# Patient Record
Sex: FEMALE | Race: WHITE | Employment: PART TIME | URBAN - METROPOLITAN AREA
[De-identification: names, ages, dates, MRNs, and addresses within clinical notes are randomized per-mention and may not be internally consistent; named-entity substitution may affect disease eponyms.]

---

## 2018-07-14 ENCOUNTER — HOSPITAL ENCOUNTER (EMERGENCY)
Age: 28
Discharge: LEFT BEFORE DISCHARGE | End: 2018-07-14
Attending: EMERGENCY MEDICINE
Payer: COMMERCIAL

## 2018-07-14 VITALS
WEIGHT: 107.4 LBS | TEMPERATURE: 98.4 F | BODY MASS INDEX: 17.9 KG/M2 | HEART RATE: 86 BPM | HEIGHT: 65 IN | OXYGEN SATURATION: 100 % | RESPIRATION RATE: 11 BRPM

## 2018-07-14 DIAGNOSIS — R10.84 GENERALIZED ABDOMINAL PAIN: Primary | ICD-10-CM

## 2018-07-14 LAB
BACTERIA: ABNORMAL /HPF
BILIRUBIN URINE: NEGATIVE MG/DL
BLOOD, URINE: ABNORMAL
CLARITY: ABNORMAL
COLOR: ABNORMAL
EPITHELIAL CELLS, UA: ABNORMAL /HPF
GLUCOSE URINE: NEGATIVE MG/DL
KETONES, URINE: NEGATIVE MG/DL
LEUKOCYTE ESTERASE, URINE: NEGATIVE
MICROSCOPIC EXAMINATION: YES
MUCUS: ABNORMAL /LPF
NITRITE, URINE: NEGATIVE
PH UA: 6.5
PREGNANCY, URINE: NEGATIVE
PROTEIN UA: ABNORMAL MG/DL
RBC UA: ABNORMAL /HPF (ref 0–2)
SPECIFIC GRAVITY UA: 1.02
UROBILINOGEN, URINE: 0.2 E.U./DL
WBC UA: ABNORMAL /HPF (ref 0–5)

## 2018-07-14 PROCEDURE — 81001 URINALYSIS AUTO W/SCOPE: CPT

## 2018-07-14 PROCEDURE — 84703 CHORIONIC GONADOTROPIN ASSAY: CPT

## 2018-07-14 PROCEDURE — 99283 EMERGENCY DEPT VISIT LOW MDM: CPT

## 2018-07-14 RX ORDER — METOCLOPRAMIDE HYDROCHLORIDE 5 MG/ML
10 INJECTION INTRAMUSCULAR; INTRAVENOUS ONCE
Status: DISCONTINUED | OUTPATIENT
Start: 2018-07-14 | End: 2018-07-15 | Stop reason: HOSPADM

## 2018-07-14 RX ORDER — KETOROLAC TROMETHAMINE 30 MG/ML
15 INJECTION, SOLUTION INTRAMUSCULAR; INTRAVENOUS ONCE
Status: DISCONTINUED | OUTPATIENT
Start: 2018-07-14 | End: 2018-07-15 | Stop reason: HOSPADM

## 2018-07-14 RX ORDER — DICYCLOMINE HCL 20 MG
20 TABLET ORAL ONCE
Status: DISCONTINUED | OUTPATIENT
Start: 2018-07-14 | End: 2018-07-15 | Stop reason: HOSPADM

## 2018-07-14 RX ORDER — SODIUM CHLORIDE, SODIUM LACTATE, POTASSIUM CHLORIDE, AND CALCIUM CHLORIDE .6; .31; .03; .02 G/100ML; G/100ML; G/100ML; G/100ML
1000 INJECTION, SOLUTION INTRAVENOUS ONCE
Status: DISCONTINUED | OUTPATIENT
Start: 2018-07-14 | End: 2018-07-15 | Stop reason: HOSPADM

## 2018-07-14 RX ORDER — ONDANSETRON 2 MG/ML
4 INJECTION INTRAMUSCULAR; INTRAVENOUS ONCE
Status: DISCONTINUED | OUTPATIENT
Start: 2018-07-14 | End: 2018-07-15 | Stop reason: HOSPADM

## 2018-07-15 NOTE — ED PROVIDER NOTES
[address]          ATTENDING PHYSICIAN NOTE       Date of evaluation: 7/14/2018    Chief Complaint     Other (possible tape worms); Fatigue; Rash; and Anorexia      History of Present Illness     Kwaku Carter is a 33 y.o. female with a PMH as described below who presents to the ED today with a chief complaint of:  Nausea, vomiting, and abdominal pain. The patient states that she has had these symptoms going on for over one month. She does have a history of IBS and has seen gastroenterology and long time ago but does not take any medication other than marijuana for her symptoms. She says that she smokes on a daily basis because of her chronic abdominal pain. She feels like every time she eats she immediately throws up or has the need to have a bowel movement and has mucous-like runny stool. She had a bowel movement today and thought she saw tapeworm with additional abdominal pain and nausea prompting her presentation to the emergency department. She has additional other complaints including a chronic rash to the left side of her chest that has been present for multiple months and she has previously been diagnosed with chronic tinea corpus. The patient states that there were no other associated signs and symptoms or modifying factors. Patient rates pain as 6/10. Review of Systems     As described above in the HPI otherwise all the systems reviewed and negative as reported by the patient. Past Medical, Surgical, Family, and Social History     She has a past medical history of Anxiety; Asthma; Depression; IBS (irritable bowel syndrome); Migraine; and Tachycardia. She has a past surgical history that includes Upper gastrointestinal endoscopy; Dilation and curettage of uterus; Colonoscopy; and other surgical history. Her family history is not on file. She reports that she has been smoking Cigarettes. She has been smoking about 0.50 packs per day.  She mg/dL    Specific Gravity, UA 1.025 1.005 - 1.030    Blood, Urine SMALL (A) Negative    pH, UA 6.5 5.0 - 8.0    Protein, UA TRACE (A) Negative mg/dL    Urobilinogen, Urine 0.2 <2.0 E.U./dL    Nitrite, Urine Negative Negative    Leukocyte Esterase, Urine Negative Negative    Microscopic Examination Yes    POC Pregnancy Urine Qual   Result Value Ref Range    Pregnancy, Urine Negative Detects HCG level >25 MIU/mL       ED BEDSIDE ULTRASOUND:  n/a    RECENT VITALS:   , Temp: 98.4 °F (36.9 °C), Pulse: 86, Resp: 11, SpO2: 100 %     Procedures         ED Course     Nursing Notes, Past Medical Hx, Past Surgical Hx, Social Hx, Allergies, and Family Hx were reviewed. The patient was given the following medications:  Orders Placed This Encounter   Medications    DISCONTD: metoclopramide (REGLAN) injection 10 mg    DISCONTD: ketorolac (TORADOL) injection 15 mg    DISCONTD: ondansetron (ZOFRAN) injection 4 mg    DISCONTD: lactated ringers bolus    DISCONTD: dicyclomine (BENTYL) tablet 20 mg    DISCONTD: famotidine (PEPCID) injection 20 mg       CONSULTS:  None    MEDICAL DECISION MAKING / ASSESSMENT / Miladis Patience is a 33 y.o. female on examination patient is very well-appearing with some mild generalized abdominal discomfort. Her symptoms have been going on for multiple months. After a long conversation with the patient, I offered her medications to help with her symptoms. When I left the room, nursing staff went back in to check on the patient and the patient had eloped from the emergency department. Prior to the patient eloping I had talked to her about the need to stop smoking marijuana on a daily basis as this is likely exacerbating her symptoms. The patient told me that she smokes every day and has been doing this for 5 years and did not feel like this was the cause of her symptoms. Clinical Impression     1.  Generalized abdominal pain        Disposition     PATIENT REFERRED TO:  No follow-up provider specified.     DISCHARGE MEDICATIONS:  Discharge Medication List as of 7/14/2018 10:44 PM          DISPOSITION           Ellen Weber MD  07/15/18 7300

## 2020-03-03 ENCOUNTER — HOSPITAL ENCOUNTER (EMERGENCY)
Age: 30
Discharge: HOME | End: 2020-03-03
Attending: EMERGENCY MEDICINE
Payer: COMMERCIAL

## 2020-03-03 VITALS
OXYGEN SATURATION: 100 % | SYSTOLIC BLOOD PRESSURE: 96 MMHG | BODY MASS INDEX: 17.81 KG/M2 | WEIGHT: 107 LBS | DIASTOLIC BLOOD PRESSURE: 70 MMHG | TEMPERATURE: 98.1 F | RESPIRATION RATE: 20 BRPM | HEART RATE: 105 BPM

## 2020-03-03 PROCEDURE — 6370000000 HC RX 637 (ALT 250 FOR IP): Performed by: PHYSICIAN ASSISTANT

## 2020-03-03 PROCEDURE — 99283 EMERGENCY DEPT VISIT LOW MDM: CPT

## 2020-03-03 RX ORDER — LIDOCAINE 50 MG/G
1 PATCH TOPICAL DAILY
Qty: 30 PATCH | Refills: 0 | Status: SHIPPED | OUTPATIENT
Start: 2020-03-03 | End: 2020-04-02

## 2020-03-03 RX ORDER — IBUPROFEN 400 MG/1
800 TABLET ORAL ONCE
Status: COMPLETED | OUTPATIENT
Start: 2020-03-03 | End: 2020-03-03

## 2020-03-03 RX ORDER — LIDOCAINE 4 G/G
1 PATCH TOPICAL DAILY
Status: DISCONTINUED | OUTPATIENT
Start: 2020-03-04 | End: 2020-03-03

## 2020-03-03 RX ORDER — CYCLOBENZAPRINE HCL 10 MG
10 TABLET ORAL ONCE
Status: COMPLETED | OUTPATIENT
Start: 2020-03-03 | End: 2020-03-03

## 2020-03-03 RX ORDER — LIDOCAINE 4 G/G
1 PATCH TOPICAL ONCE
Status: DISCONTINUED | OUTPATIENT
Start: 2020-03-03 | End: 2020-03-04 | Stop reason: HOSPADM

## 2020-03-03 RX ORDER — CYCLOBENZAPRINE HCL 5 MG
5 TABLET ORAL 2 TIMES DAILY PRN
Qty: 10 TABLET | Refills: 0 | Status: SHIPPED | OUTPATIENT
Start: 2020-03-03 | End: 2020-03-13

## 2020-03-03 RX ADMIN — CYCLOBENZAPRINE HYDROCHLORIDE 10 MG: 10 TABLET, FILM COATED ORAL at 21:51

## 2020-03-03 RX ADMIN — IBUPROFEN 800 MG: 400 TABLET, FILM COATED ORAL at 21:51

## 2020-03-03 ASSESSMENT — ENCOUNTER SYMPTOMS
BACK PAIN: 1
NAUSEA: 0
VOMITING: 0

## 2020-03-03 ASSESSMENT — PAIN DESCRIPTION - DESCRIPTORS: DESCRIPTORS: STABBING;SHARP

## 2020-03-03 ASSESSMENT — PAIN DESCRIPTION - LOCATION: LOCATION: NECK

## 2020-03-03 ASSESSMENT — PAIN DESCRIPTION - PAIN TYPE: TYPE: CHRONIC PAIN

## 2020-03-03 ASSESSMENT — PAIN SCALES - GENERAL
PAINLEVEL_OUTOF10: 10
PAINLEVEL_OUTOF10: 10

## 2020-03-03 ASSESSMENT — PAIN DESCRIPTION - FREQUENCY: FREQUENCY: CONTINUOUS

## 2020-03-04 NOTE — ED PROVIDER NOTES
ED Attending Attestation Note     Date of evaluation: 3/3/2020    This patient was seen by the advance practice provider. I have seen and examined the patient, agree with the workup, evaluation, management and diagnosis. The care plan has been discussed. My assessment reveals well appearing ffemale with atraumatic right neck/trap pain after picking something up. Exam reveals full ROM in neck and LUE, neurovasc intact, no midline c/t/l spine pain.      Shai Orona MD  03/03/20 [id number]

## 2020-03-04 NOTE — ED PROVIDER NOTES
[address] [facility] ENCOUNTER          PHYSICIAN ASSISTANT NOTE       Date of evaluation: 3/3/2020    Chief Complaint     Neck Pain      History of Present Illness     Maryann Dale is a 26 y.o. female who presents to the emergency department complaining of right-sided neck and back pain which is been ongoing today and started while she was in the bathroom attempting to fix her glasses. She reports that she was in a crouched position over her glasses for several minutes and when she straightened herself back up she had stiffness and pain on the right side of her neck as well as into the right side of her back and underneath her right shoulder blade. Over the course of the day she is continue to try to stretch this area out but the pain and stiffness has been worsening. She has never had symptoms like this in the past.  She has never had any back or neck surgeries in the past.  She has not taken any medication for relief to this point. She denies any injury preceding the symptoms. She denies fevers, chills, numbness, tingling, chest pain, dizziness, headache or other symptoms at this time. Review of Systems     Review of Systems   Gastrointestinal: Negative for nausea and vomiting. Musculoskeletal: Positive for back pain, neck pain and neck stiffness. Neurological: Negative for dizziness, seizures, numbness and headaches. Past Medical, Surgical, Family, and Social History     She has a past medical history of Anxiety, Asthma, Depression, IBS (irritable bowel syndrome), Migraine, and Tachycardia. She has a past surgical history that includes Upper gastrointestinal endoscopy; Dilation and curettage of uterus; Colonoscopy; and other surgical history. Her family history is not on file. She reports that she has been smoking cigarettes. She has been smoking about 0.50 packs per day. She has never used smokeless tobacco. She reports current alcohol use.  She reports current

## 2020-03-04 NOTE — ED NOTES
Dc inst and scripts given to pt and verb understanding.   Waiting in room until boyfriend gets back to take her home     Sonia Juárez RN  03/03/20 [id number]

## 2020-03-14 VITALS
HEART RATE: 93 BPM | RESPIRATION RATE: 18 BRPM | DIASTOLIC BLOOD PRESSURE: 64 MMHG | OXYGEN SATURATION: 99 % | TEMPERATURE: 98.2 F | SYSTOLIC BLOOD PRESSURE: 111 MMHG

## 2020-03-14 RX ORDER — CYCLOBENZAPRINE HCL 10 MG
10 TABLET ORAL 3 TIMES DAILY PRN
COMMUNITY
End: 2021-01-21 | Stop reason: ALTCHOICE

## 2020-03-14 RX ORDER — IBUPROFEN 800 MG/1
800 TABLET ORAL EVERY 6 HOURS PRN
COMMUNITY
End: 2021-01-21 | Stop reason: ALTCHOICE

## 2020-03-15 ENCOUNTER — HOSPITAL ENCOUNTER (EMERGENCY)
Age: 30
Discharge: LEFT BEFORE DISCHARGE | End: 2020-03-15
Payer: COMMERCIAL

## 2020-03-15 PROCEDURE — 4500000002 HC ER NO CHARGE

## 2020-03-16 ENCOUNTER — HOSPITAL ENCOUNTER (EMERGENCY)
Age: 30
Discharge: HOME | End: 2020-03-16
Payer: COMMERCIAL

## 2020-03-16 VITALS
RESPIRATION RATE: 16 BRPM | HEIGHT: 66 IN | SYSTOLIC BLOOD PRESSURE: 111 MMHG | WEIGHT: 115 LBS | BODY MASS INDEX: 18.48 KG/M2 | OXYGEN SATURATION: 100 % | HEART RATE: 125 BPM | DIASTOLIC BLOOD PRESSURE: 72 MMHG | TEMPERATURE: 98.7 F

## 2020-03-16 PROCEDURE — 99282 EMERGENCY DEPT VISIT SF MDM: CPT

## 2020-03-16 RX ORDER — METHYLPREDNISOLONE 4 MG/1
TABLET ORAL
Qty: 1 KIT | Refills: 0 | Status: SHIPPED | OUTPATIENT
Start: 2020-03-16 | End: 2020-03-22

## 2020-03-16 RX ORDER — METHOCARBAMOL 750 MG/1
750 TABLET, FILM COATED ORAL 4 TIMES DAILY
Qty: 20 TABLET | Refills: 0 | Status: SHIPPED | OUTPATIENT
Start: 2020-03-16 | End: 2020-03-26

## 2020-03-16 ASSESSMENT — PAIN DESCRIPTION - PROGRESSION: CLINICAL_PROGRESSION: GRADUALLY WORSENING

## 2020-03-16 ASSESSMENT — PAIN DESCRIPTION - ONSET: ONSET: PROGRESSIVE

## 2020-03-16 ASSESSMENT — PAIN DESCRIPTION - PAIN TYPE: TYPE: ACUTE PAIN

## 2020-03-16 ASSESSMENT — PAIN - FUNCTIONAL ASSESSMENT: PAIN_FUNCTIONAL_ASSESSMENT: PREVENTS OR INTERFERES SOME ACTIVE ACTIVITIES AND ADLS

## 2020-03-16 ASSESSMENT — PAIN DESCRIPTION - LOCATION: LOCATION: ARM

## 2020-03-16 ASSESSMENT — PAIN SCALES - GENERAL: PAINLEVEL_OUTOF10: 8

## 2020-03-16 ASSESSMENT — PAIN DESCRIPTION - FREQUENCY: FREQUENCY: CONTINUOUS

## 2020-03-16 ASSESSMENT — PAIN DESCRIPTION - ORIENTATION: ORIENTATION: RIGHT

## 2020-03-16 ASSESSMENT — PAIN DESCRIPTION - DESCRIPTORS: DESCRIPTORS: BURNING;SHOOTING

## 2020-03-16 NOTE — ED NOTES
Discharge order received. Patient being DC home. All paperwork explained to patient. She verbalizes understanding and denies any questions. All belongings sent with patient.       Farhana Mitchell RN  03/16/20 2127

## 2020-03-16 NOTE — ED PROVIDER NOTES
smokeless tobacco. She reports current alcohol use. She reports current drug use. Drug: Marijuana. Family History: Her family history is not on file. Medications     Discharge Medication List as of 3/16/2020  1:47 PM      CONTINUE these medications which have NOT CHANGED    Details   ibuprofen (ADVIL;MOTRIN) 800 MG tablet Take 800 mg by mouth every 6 hours as needed for PainHistorical Med      cyclobenzaprine (FLEXERIL) 10 MG tablet Take 10 mg by mouth 3 times daily as needed for Muscle spasmsHistorical Med      lidocaine (LIDODERM) 5 % Place 1 patch onto the skin daily 12 hours on, 12 hours off., Disp-30 patch, R-0Print             Allergies     Allergies: Allergies as of 03/16/2020 - Review Complete 03/16/2020   Allergen Reaction Noted    Penicillins Hives 06/04/2011        Physical Exam     INITIAL VITALS: BP: 111/72, Temp: 98.7 °F (37.1 °C), Pulse: 125, Resp: 16, SpO2: 100 %     General: 31 y.o. female in no apparent distress, well developed, well nourished, non-toxic appearance. HEENT: Atraumatic, normocephalic. EOMs intact. Neck:  Full range of motion. Chest/pulm: Respiratory rate normal. Speaks in complete sentences, no respiratory distress, lungs CTA bilaterally, no wheezes, rales, rhonchi. Cardiovascular: Heart rate normal. RRR, no murmurs, rubs, gallops     Abdomen: No gross distension. Soft, non-tender, non-peritoneal.     : Deferred. Musculoskeletal: No obvious joint deformity. Ambulates without difficulty. Midline cervical, thoracic, or lumbar spinal tenderness to palpation. Mild right cervical paraspinous muscle and trapezius tenderness. Her Spurling is negative. She has 5 out of 5 strength with shoulder abduction, elbow flexion, extension, wrist flexion, extension and handgrip bilaterally. Neuro: A&O x 4. Normal speech without dysarthria or aphasia. Moves all extremities spontaneously and symmetrically. Gait normal without ataxia. Skin: Warm, dry.  No obvious rashes, petechiae, or purpura. Psych: Appropriate mood and affect, normal interaction. Diagnostic Results     RECENT VITALS:  BP: 111/72, Temp: 98.7 °F (37.1 °C), Pulse: 125, Resp: 16, SpO2: 100 %     Procedures     None     ED Course     Nursing Notes, Past Medical Hx, Past Surgical Hx, Social Hx, Allergies, and Family Hx were reviewed. The patient was given the following medications:  Orders Placed This Encounter   Medications    methocarbamol (ROBAXIN-750) 750 MG tablet     Sig: Take 1 tablet by mouth 4 times daily for 10 days     Dispense:  20 tablet     Refill:  0    methylPREDNISolone (MEDROL, MARISSA,) 4 MG tablet     Sig: Take by mouth. Dispense:  1 kit     Refill:  0            CONSULTS:  None    MEDICAL DECISION MAKING / ASSESSMENT / PLAN     Briefly, Lyle Simms is a 31 y.o. female who presented to the emergency department with right arm pain. On presentation, she is well-appearing in no acute distress. She is initially tachycardic to 125 with otherwise stable vital signs. Her tachycardia resolved on my exam.  On exam, she has no bony tenderness to palpation, does have some right paraspinous muscle tenderness and trapezius tenderness but a negative Spurling and strength through lateral upper extremities. I do not believe she requires imaging. She became tearful when I told her that I could not get an MRI from the emergency department. She repeatedly states that she \"just wants help\". I told her that I could refer her to the [facility] spine center for further evaluation possible imaging. We will try some Robaxin instead of the Flexeril. Medrol Dosepak may also help her. Was encouraged on mobility, ice, heat, and other adjunct pain relief therapies. At this point in time, patient is stable for discharge. Patient was given strict return precautions as outlined in the AVS. Patient was agreeable and understanding to this plan of care.  Prior to discharge, patient was ambulatory and PO tolerant. The following PPE (COVID) was worn during this encounter:  mask     I saw this patient independently as the advance practice provider. The patient was not seen or evaluated by the attending physician    Clinical Impression     1.  Radiculopathy, cervical        Disposition     DC    DISCHARGE MEDICATIONS:  Discharge Medication List as of 3/16/2020  1:47 PM      START taking these medications    Details   methocarbamol (ROBAXIN-750) 750 MG tablet Take 1 tablet by mouth 4 times daily for 10 days, Disp-20 tablet, R-0Print      methylPREDNISolone (MEDROL, MARISSA,) 4 MG tablet Take by mouth., Disp-1 kit, R-0Print             PATIENT REFERRED TO:  [facility] Emergency Department  [address] [place] [address]  [phone]    As needed, If symptoms worsen    Jef Brown 3069  [address] [facility] [address]  Department of Emergency Medicine     [address] [place] [address]  03/16/20 5856

## 2020-12-18 ENCOUNTER — APPOINTMENT (OUTPATIENT)
Dept: CT IMAGING | Age: 30
End: 2020-12-18
Payer: COMMERCIAL

## 2020-12-18 ENCOUNTER — HOSPITAL ENCOUNTER (EMERGENCY)
Age: 30
Discharge: HOME | End: 2020-12-18
Payer: COMMERCIAL

## 2020-12-18 VITALS
SYSTOLIC BLOOD PRESSURE: 106 MMHG | OXYGEN SATURATION: 100 % | TEMPERATURE: 98.1 F | RESPIRATION RATE: 16 BRPM | DIASTOLIC BLOOD PRESSURE: 70 MMHG | HEART RATE: 85 BPM

## 2020-12-18 LAB — HCG(URINE) PREGNANCY TEST: NEGATIVE

## 2020-12-18 PROCEDURE — 84703 CHORIONIC GONADOTROPIN ASSAY: CPT

## 2020-12-18 PROCEDURE — 12001 RPR S/N/AX/GEN/TRNK 2.5CM/<: CPT

## 2020-12-18 PROCEDURE — 6370000000 HC RX 637 (ALT 250 FOR IP): Performed by: NURSE PRACTITIONER

## 2020-12-18 PROCEDURE — 99283 EMERGENCY DEPT VISIT LOW MDM: CPT

## 2020-12-18 PROCEDURE — 70450 CT HEAD/BRAIN W/O DYE: CPT

## 2020-12-18 RX ORDER — ONDANSETRON 4 MG/1
4-8 TABLET, ORALLY DISINTEGRATING ORAL EVERY 12 HOURS PRN
Qty: 12 TABLET | Refills: 0 | Status: SHIPPED | OUTPATIENT
Start: 2020-12-18 | End: 2021-01-21 | Stop reason: ALTCHOICE

## 2020-12-18 RX ORDER — ACETAMINOPHEN 500 MG
1000 TABLET ORAL 4 TIMES DAILY PRN
Qty: 120 TABLET | Refills: 0 | Status: SHIPPED | OUTPATIENT
Start: 2020-12-18 | End: 2021-01-21 | Stop reason: ALTCHOICE

## 2020-12-18 RX ORDER — ACETAMINOPHEN 500 MG
1000 TABLET ORAL ONCE
Status: DISCONTINUED | OUTPATIENT
Start: 2020-12-18 | End: 2020-12-19 | Stop reason: HOSPADM

## 2020-12-18 RX ORDER — ONDANSETRON 4 MG/1
8 TABLET, ORALLY DISINTEGRATING ORAL ONCE
Status: COMPLETED | OUTPATIENT
Start: 2020-12-18 | End: 2020-12-18

## 2020-12-18 RX ADMIN — ONDANSETRON 8 MG: 4 TABLET, ORALLY DISINTEGRATING ORAL at 21:13

## 2020-12-18 ASSESSMENT — PAIN DESCRIPTION - LOCATION: LOCATION: HEAD

## 2020-12-18 ASSESSMENT — PAIN DESCRIPTION - PAIN TYPE: TYPE: ACUTE PAIN

## 2020-12-18 ASSESSMENT — PAIN SCALES - GENERAL
PAINLEVEL_OUTOF10: 5
PAINLEVEL_OUTOF10: 7
PAINLEVEL_OUTOF10: 5
PAINLEVEL_OUTOF10: 3

## 2020-12-19 NOTE — ED PROVIDER NOTES
GASTROINTESTINAL ENDOSCOPY         CURRENT MEDICATIONS  (may include discharge medications prescribed in the ED)  Current Outpatient Rx   Medication Sig Dispense Refill    ondansetron (ZOFRAN ODT) 4 MG disintegrating tablet Take 1-2 tablets by mouth every 12 hours as needed for Nausea May Sub regular tablet (non-ODT) if insurance does not cover ODT.  12 tablet 0    acetaminophen (TYLENOL) 500 MG tablet Take 2 tablets by mouth 4 times daily as needed for Pain 120 tablet 0    ibuprofen (ADVIL;MOTRIN) 800 MG tablet Take 800 mg by mouth every 6 hours as needed for Pain      cyclobenzaprine (FLEXERIL) 10 MG tablet Take 10 mg by mouth 3 times daily as needed for Muscle spasms         ALLERGIES    Allergies   Allergen Reactions    Penicillins Hives       SOCIAL & FAMILY HISTORY    Social History     Socioeconomic History    Marital status: Single     Spouse name: None    Number of children: None    Years of education: None    Highest education level: None   Occupational History    None   Social Needs    Financial resource strain: None    Food insecurity     Worry: None     Inability: None    Transportation needs     Medical: None     Non-medical: None   Tobacco Use    Smoking status: Current Every Day Smoker     Packs/day: 0.50     Types: Cigarettes    Smokeless tobacco: Never Used   Substance and Sexual Activity    Alcohol use: Yes     Comment: rare    Drug use: Yes     Types: Marijuana     Comment: daily    Sexual activity: None   Lifestyle    Physical activity     Days per week: None     Minutes per session: None    Stress: None   Relationships    Social connections     Talks on phone: None     Gets together: None     Attends [employer] witness service: None     Active member of club or organization: None     Attends meetings of clubs or organizations: None     Relationship status: None    Intimate partner violence     Fear of current or ex partner: None     Emotionally abused: None     Physically abused: None     Forced sexual activity: None   Other Topics Concern    None   Social History Narrative    None     History reviewed. No pertinent family history. PHYSICAL EXAM    VITAL SIGNS: /70   Pulse 85   Temp 98.1 °F (36.7 °C) (Oral)   Resp 16   SpO2 100%    Constitutional:  Well developed, well nourished, no acute distress   Scalp: no scalp hematoma, no palpable skull fractures  Neck: no posterior midline neck tenderness, no JVD   Eyes: Pupils equally round and react to light, extraocular movement are intact, no juarez signs or raccoon eyes  HENT:  No trismus, airway patent, no hemotympanum bilaterally  Respiratory: Respirations easy and unlabored, no retractions   Cardiovascular:  regular rate, no murmurs  GI:  Soft, nontender, normal bowel sounds  Musculoskeletal:  Moves all 4 extremities spontaneously, no edema, no acute deformities  Vascular: Radial and DP pulses 2+ and equal bilaterally  Integument:  Well hydrated, a small 0.5 cm laceration to the left forehead that is not bleeding, does not extend into the subcutaneous tissue, no surrounding erythema. Declining tetanus update  Neurologic:  Awake, alert, oriented x4, no aphasia, no slurred speech, CN II-XII intact, equal strength in all 4 extremities, sensation to light touch intact bilaterally, no gait abnormalities  Psych: Pleasant affect, no hallucinations    RADIOLOGY    CT Head WO Contrast   Final Result   No acute intracranial abnormality. Left frontal scalp hematoma. PROCEDURE  Laceration Repair Procedure Note  Performed by: myself  Consent:  Verbal consent obtained by patient. Risk of infection and pain discussed, as well as alternatives.   Anesthesia:  n/a  Repair type:  simple       Pre-procedure:  Patient was prepped and draped in usual sterile fashion   Laceration details:    Location: left forehead    Length (cm):  0.5cm  Preparation:  Patient was prepped and draped in usual sterile fashion   Exploration: Hemostasis achieved with direct pressure, entire depth of wound probed and visualized    Contaminated: no    Treatment:   Amount of cleaning:  Extensive     Irrigation solution:  High pressure tap water  Visualized foreign bodies/material removed: no    Skin repair:   Repair method:  dermabond   Approximation:  Close  Dressing:  C/d/i dressing  Patient tolerance of procedure: Tolerated well, no immediate complications        ED COURSE & MEDICAL DECISION MAKING    Pertinent Labs & Imaging studies reviewed and interpreted. (See chart for details)    Differential Diagnosis: epidural hematoma, subdural hematoma, parenchymal brain contusion or bleed, subarachnoid hemorrhage, skull fracture, neck fracture or dislocation, other. Neuro exam unremarkable. Given the multiple episodes of nausea and vomiting after the injury event [place] head CT criteria was met for CT head. CT scan is read by the radiologist as above. C-spine imaging not indicated per NEXUS criteria: patient has no focal neurological deficit, no midline spine tenderness, no altered level of consciousness, no intoxication, and no distracting injury. Reevaluation at 2305: Patient is feeling much better after the analgesics given to her here in the ED. She remains neurologically intact. I do believe that she is stable for discharge home. Remained afebrile and hemodynamically stable throughout her entire ED course. She is to follow-up with primary care provider. She is in agreement with this plan as well as plan of discharge verbalized understanding of strict return parameters with no further questions or concerns.     Results for orders placed or performed during the hospital encounter of 12/18/20   Pregnancy, Urine   Result Value Ref Range    HCG(Urine) Pregnancy Test Negative Detects HCG level >20 MIU/mL       I estimate there is LOW risk for SUBARACHNOID HEMORRHAGE, MENINGITIS, INTRACRANIAL HEMORRHAGE, SUBDURAL HEMATOMA, OR STROKE, thus I consider the discharge disposition reasonable. Jewel Harada and MIGUEL have discussed the diagnosis and risks, and we agree with discharging home to follow-up with their primary doctor. We also discussed returning to the Emergency Department immediately if new or worsening symptoms occur. We have discussed the symptoms which are most concerning (e.g., changing or worsening pain, weakness, vomiting, fever) that necessitate immediate return. Discharge Vital Signs:  Blood pressure 106/70, pulse 85, temperature 98.1 °F (36.7 °C), temperature source Oral, resp. rate 16, SpO2 100 %, currently breastfeeding. The patient was instructed to follow up as an outpatient in 1-3 days. The patient was instructed to return to the ED immediately for any new or worsening symptoms. The patient verbalized understanding. FINAL IMPRESSION    1. Laceration of forehead, initial encounter    2. Closed head injury, initial encounter    3.  Non-intractable vomiting with nausea, unspecified vomiting type        PLAN   Discharge with outpatient follow-up (see EMR)    (Please note that this note was completed with a voice recognition program.  Every attempt was made to edit the dictations, but inevitably there remain words that are mis-transcribed.)        Allison Solano, PAM - CNP  12/18/20 [facility] - [facility]  12/18/20 0489

## 2020-12-19 NOTE — ED NOTES
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 EMS stated pt had episode of vomiting, pt anxious. Laceration on left side of forehead. No LOC.       Rosibel Castillo RN  12/18/20 2040

## 2020-12-19 NOTE — ED NOTES
Bed: 27  Expected date:   Expected time:   Means of arrival:   Comments:  Karla Aguilar  12/18/20 2040

## 2020-12-19 NOTE — ED NOTES
Patient's visitor provided with copy of Emergency Department Visitor Restrictions. Instructed to review while waiting to visit with patient. Visitor verbalized understanding.      Yohannes Jackson RN  12/18/20 2046

## 2021-01-21 ENCOUNTER — HOSPITAL ENCOUNTER (INPATIENT)
Age: 31
LOS: 8 days | Discharge: HOME | DRG: 751 | End: 2021-01-29
Attending: STUDENT IN AN ORGANIZED HEALTH CARE EDUCATION/TRAINING PROGRAM | Admitting: PSYCHIATRY & NEUROLOGY
Payer: COMMERCIAL

## 2021-01-21 DIAGNOSIS — F60.3 BORDERLINE PERSONALITY DISORDER IN ADULT (HCC): ICD-10-CM

## 2021-01-21 DIAGNOSIS — E44.0 MODERATE PROTEIN-CALORIE MALNUTRITION (HCC): ICD-10-CM

## 2021-01-21 DIAGNOSIS — R51.9 ACUTE NONINTRACTABLE HEADACHE, UNSPECIFIED HEADACHE TYPE: ICD-10-CM

## 2021-01-21 DIAGNOSIS — F33.2 SEVERE EPISODE OF RECURRENT MAJOR DEPRESSIVE DISORDER, WITHOUT PSYCHOTIC FEATURES (HCC): Primary | ICD-10-CM

## 2021-01-21 DIAGNOSIS — R45.851 SUICIDAL IDEATION: ICD-10-CM

## 2021-01-21 DIAGNOSIS — F12.10 CANNABIS ABUSE: ICD-10-CM

## 2021-01-21 PROBLEM — F33.9 MDD (MAJOR DEPRESSIVE DISORDER), RECURRENT EPISODE (HCC): Status: ACTIVE | Noted: 2021-01-21

## 2021-01-21 LAB
A/G RATIO: 1.8 (ref 1.1–2.2)
ACETAMINOPHEN LEVEL: <5 UG/ML (ref 10–30)
ALBUMIN SERPL-MCNC: 4.5 G/DL (ref 3.4–5)
ALP BLD-CCNC: 37 U/L (ref 40–129)
ALT SERPL-CCNC: 12 U/L (ref 10–40)
AMPHETAMINE SCREEN, URINE: ABNORMAL
ANION GAP SERPL CALCULATED.3IONS-SCNC: 9 MMOL/L (ref 3–16)
AST SERPL-CCNC: 14 U/L (ref 15–37)
BACTERIA: ABNORMAL /HPF
BARBITURATE SCREEN URINE: ABNORMAL
BASOPHILS ABSOLUTE: 0.1 K/UL (ref 0–0.2)
BASOPHILS RELATIVE PERCENT: 0.9 %
BENZODIAZEPINE SCREEN, URINE: ABNORMAL
BILIRUB SERPL-MCNC: 0.5 MG/DL (ref 0–1)
BILIRUBIN URINE: NEGATIVE
BLOOD, URINE: ABNORMAL
BUN BLDV-MCNC: 12 MG/DL (ref 7–20)
CALCIUM SERPL-MCNC: 9.4 MG/DL (ref 8.3–10.6)
CANNABINOID SCREEN URINE: POSITIVE
CHLORIDE BLD-SCNC: 107 MMOL/L (ref 99–110)
CLARITY: CLEAR
CO2: 22 MMOL/L (ref 21–32)
COCAINE METABOLITE SCREEN URINE: ABNORMAL
COLOR: YELLOW
CREAT SERPL-MCNC: 0.8 MG/DL (ref 0.6–1.1)
EOSINOPHILS ABSOLUTE: 0.1 K/UL (ref 0–0.6)
EOSINOPHILS RELATIVE PERCENT: 1.8 %
EPITHELIAL CELLS, UA: ABNORMAL /HPF (ref 0–5)
ETHANOL: NORMAL MG/DL (ref 0–0.08)
GFR AFRICAN AMERICAN: >60
GFR NON-AFRICAN AMERICAN: >60
GLOBULIN: 2.5 G/DL
GLUCOSE BLD-MCNC: 112 MG/DL (ref 70–99)
GLUCOSE URINE: NEGATIVE MG/DL
HCG QUALITATIVE: NEGATIVE
HCT VFR BLD CALC: 38.4 % (ref 36–48)
HEMOGLOBIN: 13.2 G/DL (ref 12–16)
KETONES, URINE: NEGATIVE MG/DL
LEUKOCYTE ESTERASE, URINE: NEGATIVE
LYMPHOCYTES ABSOLUTE: 1.9 K/UL (ref 1–5.1)
LYMPHOCYTES RELATIVE PERCENT: 29.2 %
Lab: ABNORMAL
MCH RBC QN AUTO: 34.6 PG (ref 26–34)
MCHC RBC AUTO-ENTMCNC: 34.4 G/DL (ref 31–36)
MCV RBC AUTO: 100.4 FL (ref 80–100)
METHADONE SCREEN, URINE: ABNORMAL
MICROSCOPIC EXAMINATION: YES
MONOCYTES ABSOLUTE: 0.3 K/UL (ref 0–1.3)
MONOCYTES RELATIVE PERCENT: 5.3 %
MUCUS: ABNORMAL /LPF
NEUTROPHILS ABSOLUTE: 4 K/UL (ref 1.7–7.7)
NEUTROPHILS RELATIVE PERCENT: 62.8 %
NITRITE, URINE: NEGATIVE
OPIATE SCREEN URINE: ABNORMAL
OXYCODONE URINE: ABNORMAL
PDW BLD-RTO: 12.3 % (ref 12.4–15.4)
PH UA: 7
PH UA: 7 (ref 5–8)
PHENCYCLIDINE SCREEN URINE: ABNORMAL
PLATELET # BLD: 178 K/UL (ref 135–450)
PMV BLD AUTO: 8.3 FL (ref 5–10.5)
POTASSIUM SERPL-SCNC: 3.4 MMOL/L (ref 3.5–5.1)
PROPOXYPHENE SCREEN: ABNORMAL
PROTEIN UA: NEGATIVE MG/DL
RBC # BLD: 3.82 M/UL (ref 4–5.2)
RBC UA: ABNORMAL /HPF (ref 0–4)
SALICYLATE, SERUM: <0.3 MG/DL (ref 15–30)
SARS-COV-2, NAAT: NOT DETECTED
SODIUM BLD-SCNC: 138 MMOL/L (ref 136–145)
SPECIFIC GRAVITY UA: 1.02 (ref 1–1.03)
TOTAL PROTEIN: 7 G/DL (ref 6.4–8.2)
URINE TYPE: ABNORMAL
UROBILINOGEN, URINE: 0.2 E.U./DL
WBC # BLD: 6.4 K/UL (ref 4–11)
WBC UA: ABNORMAL /HPF (ref 0–5)

## 2021-01-21 PROCEDURE — 36415 COLL VENOUS BLD VENIPUNCTURE: CPT

## 2021-01-21 PROCEDURE — G0480 DRUG TEST DEF 1-7 CLASSES: HCPCS

## 2021-01-21 PROCEDURE — 81001 URINALYSIS AUTO W/SCOPE: CPT

## 2021-01-21 PROCEDURE — 6370000000 HC RX 637 (ALT 250 FOR IP): Performed by: PSYCHIATRY & NEUROLOGY

## 2021-01-21 PROCEDURE — 1240000000 HC EMOTIONAL WELLNESS R&B

## 2021-01-21 PROCEDURE — 80307 DRUG TEST PRSMV CHEM ANLYZR: CPT

## 2021-01-21 PROCEDURE — 80061 LIPID PANEL: CPT

## 2021-01-21 PROCEDURE — 99285 EMERGENCY DEPT VISIT HI MDM: CPT

## 2021-01-21 PROCEDURE — 83036 HEMOGLOBIN GLYCOSYLATED A1C: CPT

## 2021-01-21 PROCEDURE — 84443 ASSAY THYROID STIM HORMONE: CPT

## 2021-01-21 PROCEDURE — 6370000000 HC RX 637 (ALT 250 FOR IP): Performed by: STUDENT IN AN ORGANIZED HEALTH CARE EDUCATION/TRAINING PROGRAM

## 2021-01-21 PROCEDURE — 85025 COMPLETE CBC W/AUTO DIFF WBC: CPT

## 2021-01-21 PROCEDURE — 80053 COMPREHEN METABOLIC PANEL: CPT

## 2021-01-21 PROCEDURE — U0002 COVID-19 LAB TEST NON-CDC: HCPCS

## 2021-01-21 PROCEDURE — 84703 CHORIONIC GONADOTROPIN ASSAY: CPT

## 2021-01-21 RX ORDER — POLYETHYLENE GLYCOL 3350 17 G
2 POWDER IN PACKET (EA) ORAL
Status: DISCONTINUED | OUTPATIENT
Start: 2021-01-21 | End: 2021-01-29 | Stop reason: HOSPADM

## 2021-01-21 RX ORDER — ACETAMINOPHEN 500 MG
1000 TABLET ORAL ONCE
Status: DISCONTINUED | OUTPATIENT
Start: 2021-01-21 | End: 2021-01-29 | Stop reason: HOSPADM

## 2021-01-21 RX ORDER — NICOTINE 21 MG/24HR
1 PATCH, TRANSDERMAL 24 HOURS TRANSDERMAL DAILY
Status: DISCONTINUED | OUTPATIENT
Start: 2021-01-22 | End: 2021-01-28

## 2021-01-21 RX ORDER — TRAZODONE HYDROCHLORIDE 50 MG/1
50 TABLET ORAL NIGHTLY PRN
Status: DISCONTINUED | OUTPATIENT
Start: 2021-01-21 | End: 2021-01-23

## 2021-01-21 RX ORDER — HYDROXYZINE PAMOATE 50 MG/1
50 CAPSULE ORAL 3 TIMES DAILY PRN
Status: DISCONTINUED | OUTPATIENT
Start: 2021-01-21 | End: 2021-01-29 | Stop reason: HOSPADM

## 2021-01-21 RX ORDER — ACETAMINOPHEN 325 MG/1
650 TABLET ORAL EVERY 4 HOURS PRN
Status: DISCONTINUED | OUTPATIENT
Start: 2021-01-21 | End: 2021-01-29 | Stop reason: HOSPADM

## 2021-01-21 RX ORDER — ONDANSETRON 4 MG/1
4 TABLET, ORALLY DISINTEGRATING ORAL EVERY 6 HOURS PRN
Status: DISCONTINUED | OUTPATIENT
Start: 2021-01-21 | End: 2021-01-22

## 2021-01-21 RX ORDER — IBUPROFEN 400 MG/1
400 TABLET ORAL EVERY 6 HOURS PRN
Status: DISCONTINUED | OUTPATIENT
Start: 2021-01-21 | End: 2021-01-29 | Stop reason: HOSPADM

## 2021-01-21 RX ORDER — POTASSIUM CHLORIDE 20 MEQ/1
20 TABLET, EXTENDED RELEASE ORAL ONCE
Status: COMPLETED | OUTPATIENT
Start: 2021-01-21 | End: 2021-01-21

## 2021-01-21 RX ADMIN — POTASSIUM CHLORIDE 20 MEQ: 1500 TABLET, EXTENDED RELEASE ORAL at 22:26

## 2021-01-21 RX ADMIN — TRAZODONE HYDROCHLORIDE 50 MG: 50 TABLET ORAL at 23:58

## 2021-01-21 ASSESSMENT — PAIN SCALES - GENERAL: PAINLEVEL_OUTOF10: 7

## 2021-01-21 ASSESSMENT — PAIN DESCRIPTION - LOCATION: LOCATION: HEAD

## 2021-01-22 PROBLEM — F60.3 BORDERLINE PERSONALITY DISORDER IN ADULT (HCC): Status: ACTIVE | Noted: 2021-01-22

## 2021-01-22 PROBLEM — F12.10 CANNABIS ABUSE: Status: ACTIVE | Noted: 2021-01-22

## 2021-01-22 PROBLEM — F33.2 SEVERE EPISODE OF RECURRENT MAJOR DEPRESSIVE DISORDER, WITHOUT PSYCHOTIC FEATURES (HCC): Status: ACTIVE | Noted: 2021-01-21

## 2021-01-22 LAB
CHOLESTEROL, TOTAL: 115 MG/DL (ref 0–199)
ESTIMATED AVERAGE GLUCOSE: 85.3 MG/DL
HBA1C MFR BLD: 4.6 %
HDLC SERPL-MCNC: 49 MG/DL (ref 40–60)
LDL CHOLESTEROL CALCULATED: 48 MG/DL
TRIGL SERPL-MCNC: 92 MG/DL (ref 0–150)
TSH SERPL DL<=0.05 MIU/L-ACNC: 0.67 UIU/ML (ref 0.27–4.2)
VLDLC SERPL CALC-MCNC: 18 MG/DL

## 2021-01-22 PROCEDURE — 6370000000 HC RX 637 (ALT 250 FOR IP): Performed by: PSYCHIATRY & NEUROLOGY

## 2021-01-22 PROCEDURE — 99223 1ST HOSP IP/OBS HIGH 75: CPT | Performed by: PSYCHIATRY & NEUROLOGY

## 2021-01-22 PROCEDURE — 6360000002 HC RX W HCPCS: Performed by: PSYCHIATRY & NEUROLOGY

## 2021-01-22 PROCEDURE — 1240000000 HC EMOTIONAL WELLNESS R&B

## 2021-01-22 RX ORDER — PROMETHAZINE HYDROCHLORIDE 25 MG/1
25 TABLET ORAL EVERY 6 HOURS PRN
Status: DISCONTINUED | OUTPATIENT
Start: 2021-01-22 | End: 2021-01-22

## 2021-01-22 RX ORDER — PRAZOSIN HYDROCHLORIDE 1 MG/1
1 CAPSULE ORAL NIGHTLY
Status: DISCONTINUED | OUTPATIENT
Start: 2021-01-22 | End: 2021-01-27

## 2021-01-22 RX ORDER — ONDANSETRON 4 MG/1
8 TABLET, ORALLY DISINTEGRATING ORAL EVERY 6 HOURS PRN
Status: DISCONTINUED | OUTPATIENT
Start: 2021-01-22 | End: 2021-01-22

## 2021-01-22 RX ORDER — PROMETHAZINE HYDROCHLORIDE 25 MG/ML
6.25 INJECTION, SOLUTION INTRAMUSCULAR; INTRAVENOUS EVERY 6 HOURS PRN
Status: DISCONTINUED | OUTPATIENT
Start: 2021-01-22 | End: 2021-01-22

## 2021-01-22 RX ORDER — PROMETHAZINE HYDROCHLORIDE 25 MG/ML
25 INJECTION, SOLUTION INTRAMUSCULAR; INTRAVENOUS EVERY 6 HOURS PRN
Status: DISCONTINUED | OUTPATIENT
Start: 2021-01-22 | End: 2021-01-29 | Stop reason: HOSPADM

## 2021-01-22 RX ORDER — ARIPIPRAZOLE 10 MG/1
5 TABLET ORAL DAILY
Status: DISCONTINUED | OUTPATIENT
Start: 2021-01-22 | End: 2021-01-23

## 2021-01-22 RX ORDER — TRAZODONE HYDROCHLORIDE 100 MG/1
100 TABLET ORAL ONCE
Status: COMPLETED | OUTPATIENT
Start: 2021-01-22 | End: 2021-01-22

## 2021-01-22 RX ORDER — ONDANSETRON 4 MG/1
8 TABLET, ORALLY DISINTEGRATING ORAL EVERY 8 HOURS PRN
Status: DISCONTINUED | OUTPATIENT
Start: 2021-01-22 | End: 2021-01-22

## 2021-01-22 RX ADMIN — PROMETHAZINE HYDROCHLORIDE 25 MG: 25 INJECTION INTRAMUSCULAR; INTRAVENOUS at 21:04

## 2021-01-22 RX ADMIN — ARIPIPRAZOLE 5 MG: 10 TABLET ORAL at 13:58

## 2021-01-22 RX ADMIN — HYDROXYZINE PAMOATE 50 MG: 50 CAPSULE ORAL at 01:32

## 2021-01-22 RX ADMIN — ONDANSETRON 4 MG: 4 TABLET, ORALLY DISINTEGRATING ORAL at 17:53

## 2021-01-22 RX ADMIN — TRAZODONE HYDROCHLORIDE 100 MG: 100 TABLET ORAL at 01:32

## 2021-01-22 ASSESSMENT — SLEEP AND FATIGUE QUESTIONNAIRES
DIFFICULTY STAYING ASLEEP: YES
SLEEP PATTERN: DISTURBED/INTERRUPTED SLEEP
DO YOU USE A SLEEP AID: YES
AVERAGE NUMBER OF SLEEP HOURS: 4
DIFFICULTY FALLING ASLEEP: YES
SLEEP PATTERN: DISTURBED/INTERRUPTED SLEEP;DIFFICULTY FALLING ASLEEP
RESTFUL SLEEP: NO
DO YOU USE A SLEEP AID: YES
RESTFUL SLEEP: NO
DO YOU HAVE DIFFICULTY SLEEPING: YES

## 2021-01-22 ASSESSMENT — LIFESTYLE VARIABLES
HISTORY_ALCOHOL_USE: NO
HISTORY_ALCOHOL_USE: NO

## 2021-01-22 ASSESSMENT — PATIENT HEALTH QUESTIONNAIRE - PHQ9
SUM OF ALL RESPONSES TO PHQ QUESTIONS 1-9: 16
SUM OF ALL RESPONSES TO PHQ QUESTIONS 1-9: 18

## 2021-01-22 NOTE — BH NOTE
[facility]  Admission Note     Admission Type:   Admission Type: Voluntary    Reason for admission:  Reason for Admission: SI, unable to care for self    PATIENT STRENGTHS:  Strengths: No significant Physical Illness, Communication    Patient Strengths and Limitations:  Limitations: Difficult relationships / poor social skills, General negative or hopeless attitude about future/recovery    Addictive Behavior:   Addictive Behavior  In the past 3 months, have you felt or has someone told you that you have a problem with:  : None  Do you have a history of Chemical Use?: No  Do you have a history of Alcohol Use?: No  Do you have a history of Street Drug Abuse?: No  Histroy of Prescripton Drug Abuse?: No    Medical Problems:   Past Medical History:   Diagnosis Date    Anxiety     Asthma     Depression     IBS (irritable bowel syndrome)     Migraine     Tachycardia     causes fainting       Status EXAM:  Status and Exam  Normal: No  Facial Expression: Sad, Worried  Affect: Congruent  Level of Consciousness: Alert  Mood:Normal: No  Mood: Anxious, Sad  Motor Activity:Normal: No  Motor Activity: Decreased  Interview Behavior: Cooperative  Preception: [place] to Person, Baby Artist to Time, [place] to Place, [place] to Situation  Attention:Normal: No  Attention: Distractible  Thought Processes: Circumstantial  Thought Content:Normal: No  Thought Content: Preoccupations  Hallucinations: None  Delusions: No  Memory:Normal: No  Memory: Poor Remote  Insight and Judgment: No  Insight and Judgment: Poor Judgment  Present Suicidal Ideation: No  Present Homicidal Ideation: No    Tobacco Screening:  Practical Counseling, on admission, troy X, if applicable and completed (first 3 are required if patient doesn't refuse):            ( )  Recognizing danger situations (included triggers and roadblocks)                    ( )  Coping skills (new ways to manage stress, exercise, relaxation techniques, changing routine, distraction)                                                           ( )  Basic information about quitting (benefits of quitting, techniques in how to quit, available resources  ( ) Referral for counseling faxed to [facility]                                           ( ) Patient refused counseling  ( ) Patient has not smoked in the last 30 days    Metabolic Screening:    No results found for: LABA1C    No results found for: CHOL  No results found for: TRIG  No results found for: HDL  No components found for: LDLCAL  No results found for: LABVLDL      Body mass index is 18.14 kg/m². BP Readings from Last 2 Encounters:   01/22/21 115/72   12/18/20 106/70           Pt admitted with followings belongings:  Dentures: Lowers  Vision - Corrective Lenses: Glasses  Hearing Aid: None  Jewelry: Bracelet, Watch, Ring  Body Piercings Removed: N/A  Clothing: Jacket / coat, Pants, Shirt, Socks, Undergarments (Comment)  Were All Patient Medications Collected?: Yes  Other Valuables: Cell phone, Money (Comment), Elta Balling, Other (Comment)(cell phone to safe, $4 cash in wallet, backpack, books, cigarettes and lighter, ecig)     Valuables searched upon admission. Valuables placed in safe in security envelope, number:  yes. Patient's home medications were placed in locker. Patient oriented to surroundings and program expectations and copy of patient rights given. Received admission packet:  yes. Consents reviewed, signed yes. Refused no. Patient verbalize understanding:  yes.     Patient education on precautions: yes                   Pollo Dorado RN

## 2021-01-22 NOTE — ED NOTES
blood drawn from right a/c with butterfly and dressing applied afterwards, pt tolerated well.      Garui Pulido RN  01/21/21 7229

## 2021-01-22 NOTE — ED PROVIDER NOTES
[address] ED      CHIEF COMPLAINT  Psychiatric Evaluation (having thoughts of hurting self.  states put a knife to her leg but did not break skin. brought in by [facility])       [facility]  Andrew Brand is a 31 y.o. female with a past medical history of anxiety, migraines, asthma, depression who presents to the ED complaining of suicidal ideation. Suicidal ideation: yes  Plan: denies  Previous attempts: drowning  Homicidal ideation: denies  Access to firearms: denies  Audiovisual hallucinations: denies  Psychiatric medications: denies  Tobacco use: yes  Alcohol use: occasional  Illicit drug use: medical marijuana    Somatic complaints: headache- intermittent since concussion 2-3w ago. Right frontal. Aching pain. Denies weakness, numbness, vision changes. Feels like previous headaches. Minor head trauma 3w ago. Not on blood thinners    No other complaints, modifying factors or associated symptoms. I have reviewed the following from the nursing documentation. Past Medical History:   Diagnosis Date    Anxiety     Asthma     Depression     IBS (irritable bowel syndrome)     Migraine     Tachycardia     causes fainting     Past Surgical History:   Procedure Laterality Date    APPENDECTOMY      COLONOSCOPY      DILATION AND CURETTAGE OF UTERUS      OTHER SURGICAL HISTORY      pt states she had pre-cancerous cells removed from uterus    UPPER GASTROINTESTINAL ENDOSCOPY       History reviewed. No pertinent family history.   Social History     Socioeconomic History    Marital status: Single     Spouse name: Not on file    Number of children: Not on file    Years of education: Not on file    Highest education level: Not on file   Occupational History    Not on file   Social Needs    Financial resource strain: Not on file    Food insecurity     Worry: Not on file     Inability: Not on file    Transportation needs     Medical: Not on file Non-medical: Not on file   Tobacco Use    Smoking status: Current Every Day Smoker     Packs/day: 0.50     Types: Cigarettes    Smokeless tobacco: Never Used   Substance and Sexual Activity    Alcohol use: Yes     Comment: rare    Drug use: Yes     Types: Marijuana     Comment: daily    Sexual activity: Not on file   Lifestyle    Physical activity     Days per week: Not on file     Minutes per session: Not on file    Stress: Not on file   Relationships    Social connections     Talks on phone: Not on file     Gets together: Not on file     Attends Faith service: Not on file     Active member of club or organization: Not on file     Attends meetings of clubs or organizations: Not on file     Relationship status: Not on file    Intimate partner violence     Fear of current or ex partner: Not on file     Emotionally abused: Not on file     Physically abused: Not on file     Forced sexual activity: Not on file   Other Topics Concern    Not on file   Social History Narrative    Not on file     Current Facility-Administered Medications   Medication Dose Route Frequency Provider Last Rate Last Admin    acetaminophen (TYLENOL) tablet 1,000 mg  1,000 mg Oral Once Rudy Magaña MD         No current outpatient medications on file. Allergies   Allergen Reactions    Penicillins Hives       REVIEW OF SYSTEMS  10 systems reviewed, pertinent positives per HPI otherwise noted to be negative. PHYSICAL EXAM  /71   Pulse 119   Temp 98.7 °F (37.1 °C) (Oral)   Resp 16   Ht 5' 5\" (1.651 m)   Wt 109 lb (49.4 kg)   LMP 01/01/2021   SpO2 98%   BMI 18.14 kg/m²    GENERAL APPEARANCE: Awake and alert. Cooperative. no distress. HENT: Normocephalic. Atraumatic. Mucous membranes are moist  NECK: Supple. Full range of motion of the neck without stiffness or pain. EYES: PERRL. EOM's grossly intact. HEART/CHEST: RRR. No murmurs. LUNGS: Respirations unlabored. CTAB. Good air exchange.  Speaking comfortably in full sentences. ABDOMEN: No tenderness. Soft. Non-distended. No masses. No organomegaly. No guarding or rebound. MUSCULOSKELETAL: No extremity edema. Compartments soft. No deformity. No tenderness in the extremities. All extremities neurovascularly intact. SKIN: Warm and dry. No acute rashes. NEUROLOGICAL: Alert and oriented. No gross facial drooping. Strength 5/5, sensation intact. Cranial nerves II through XII intact. PSYCHIATRIC: Flat affect, she does not appear to be responding to internal stimuli, she does endorse suicidal ideation    LABS  I have reviewed all labs for this visit.    Results for orders placed or performed during the hospital encounter of 01/21/21   CBC Auto Differential   Result Value Ref Range    WBC 6.4 4.0 - 11.0 K/uL    RBC 3.82 (L) 4.00 - 5.20 M/uL    Hemoglobin 13.2 12.0 - 16.0 g/dL    Hematocrit 38.4 36.0 - 48.0 %    .4 (H) 80.0 - 100.0 fL    MCH 34.6 (H) 26.0 - 34.0 pg    MCHC 34.4 31.0 - 36.0 g/dL    RDW 12.3 (L) 12.4 - 15.4 %    Platelets 493 285 - 813 K/uL    MPV 8.3 5.0 - 10.5 fL    Neutrophils % 62.8 %    Lymphocytes % 29.2 %    Monocytes % 5.3 %    Eosinophils % 1.8 %    Basophils % 0.9 %    Neutrophils Absolute 4.0 1.7 - 7.7 K/uL    Lymphocytes Absolute 1.9 1.0 - 5.1 K/uL    Monocytes Absolute 0.3 0.0 - 1.3 K/uL    Eosinophils Absolute 0.1 0.0 - 0.6 K/uL    Basophils Absolute 0.1 0.0 - 0.2 K/uL   Ethanol   Result Value Ref Range    Ethanol Lvl None Detected mg/dL   Urinalysis   Result Value Ref Range    Color, UA Yellow Straw/Yellow    Clarity, UA Clear Clear    Glucose, Ur Negative Negative mg/dL    Bilirubin Urine Negative Negative    Ketones, Urine Negative Negative mg/dL    Specific Gravity, UA 1.020 1.005 - 1.030    Blood, Urine MODERATE (A) Negative    pH, UA 7.0 5.0 - 8.0    Protein, UA Negative Negative mg/dL    Urobilinogen, Urine 0.2 <2.0 E.U./dL    Nitrite, Urine Negative Negative    Leukocyte Esterase, Urine Negative Negative    Microscopic Examination YES     Urine Type NotGiven    Comprehensive Metabolic Panel   Result Value Ref Range    Sodium 138 136 - 145 mmol/L    Potassium 3.4 (L) 3.5 - 5.1 mmol/L    Chloride 107 99 - 110 mmol/L    CO2 22 21 - 32 mmol/L    Anion Gap 9 3 - 16    Glucose 112 (H) 70 - 99 mg/dL    BUN 12 7 - 20 mg/dL    CREATININE 0.8 0.6 - 1.1 mg/dL    GFR Non-African American >60 >60    GFR African American >60 >60    Calcium 9.4 8.3 - 10.6 mg/dL    Total Protein 7.0 6.4 - 8.2 g/dL    Alb 4.5 3.4 - 5.0 g/dL    Albumin/Globulin Ratio 1.8 1.1 - 2.2    Total Bilirubin 0.5 0.0 - 1.0 mg/dL    Alkaline Phosphatase 37 (L) 40 - 129 U/L    ALT 12 10 - 40 U/L    AST 14 (L) 15 - 37 U/L    Globulin 2.5 g/dL   Salicylate   Result Value Ref Range    Salicylate, Serum <6.0 (L) 15.0 - 30.0 mg/dL   Acetaminophen level   Result Value Ref Range    Acetaminophen Level <5 (L) 10 - 30 ug/mL   Drug screen multi urine   Result Value Ref Range    Amphetamine Screen, Urine Neg Negative <1000ng/mL    Barbiturate Screen, Ur Neg Negative <200 ng/mL    Benzodiazepine Screen, Urine Neg Negative <200 ng/mL    Cannabinoid Scrn, Ur POSITIVE (A) Negative <50 ng/mL    Cocaine Metabolite Screen, Urine Neg Negative <300 ng/mL    Opiate Scrn, Ur Neg Negative <300 ng/mL    PCP Screen, Urine Neg Negative <25 ng/mL    Methadone Screen, Urine Neg Negative <300 ng/mL    Propoxyphene Scrn, Ur Neg Negative <300 ng/mL    Oxycodone Urine Neg Negative <100 ng/ml    pH, UA 7.0     Drug Screen Comment: see below    COVID-19   Result Value Ref Range    SARS-CoV-2, NAAT Not Detected Not Detected   hCG, serum, qualitative   Result Value Ref Range    hCG Qual Negative Detects HCG level >10 MIU/mL   Microscopic Urinalysis   Result Value Ref Range    Mucus, UA Rare (A) None Seen /LPF    WBC, UA 3-5 0 - 5 /HPF    RBC, UA 3-4 0 - 4 /HPF    Epithelial Cells, UA 11-20 (A) 0 - 5 /HPF    Bacteria, UA 1+ (A) None Seen /HPF       During the patient's ED course, the patient was given: Medications   acetaminophen (TYLENOL) tablet 1,000 mg (1,000 mg Oral Not Given 1/21/21 2226)   potassium chloride (KLOR-CON M) extended release tablet 20 mEq (20 mEq Oral Given 1/21/21 2226)        ED COURSE/MDM  Patient seen and evaluated. Old records reviewed. Labs and imaging reviewed and results discussed with patient. Overall, tearful but well appearing patient in no acute distress, presenting for suicidal ideation. She does complain of a headache, no red flag symptoms. Physical exam remarkable for intact neurologic exam.     Laboratory studies obtained for medical clearance. Work-up showed:    ED Course as of Jan 21 2341   Thu Jan 21, 2021 2155 No leukocytosis, anemia, thrombocytopenia. [ER]   2155 Mild hypokalemia, no other electrolyte abnormalities or evidence of kidney dysfunction.    [ER]   2155 Liver function testing unremarkable. [ER]   2155 Urinalysis shows moderate blood, no evidence of infection. She is not complaining of any abdominal pain, low suspicion for clinically significant kidney stone. Reports she is having some vaginal spotting.    [ER]   2156 Urine drug screen positive for cannabinoids. Patient states she uses medical marijuana. [ER]   2156 Ethanol, Tylenol, and salicylate levels negative. [ER]   2156 Covid negative. [ER]   2217 Patient is not pregnant.    [ER]   2220 Given Tylenol and potassium. At this time, she is medically cleared. [ER]      ED Course User Index  [ER] Rudy Magaña MD     Patient does complain of headache. She does report minor head trauma 2 to 3 weeks ago. She denies any red flag symptoms. She is not on blood thinners, no vomiting, no neurologic deficits. No abnormalities on neurologic physical exam.  At this time, do not feel that CT head is indicated. Patient given Tylenol for pain control. Patient medically cleared. Patient to be admitted to psychiatry. CLINICAL IMPRESSION  1. Suicidal ideation    2.  Acute nonintractable headache, unspecified headache type        Blood pressure (!) 100/39, pulse 70, temperature 97.8 °F (36.6 °C), temperature source Temporal, resp. rate 16, height 5' 5\" (1.651 m), weight 109 lb (49.4 kg), last menstrual period 01/01/2021, SpO2 96 %, currently breastfeeding. DISPOSITION  Ekta Appiah was admitted in stable condition. DISCLAIMER: This chart was created using Dragon dictation software. Efforts were made by me to ensure accuracy, however some errors may be present due to limitations of this technology and occasionally words are not transcribed correctly.       Fernadno Alanis MD  01/22/21 6420

## 2021-01-22 NOTE — FLOWSHEET NOTE
01/22/21 1442   Activities of Daily Living   Patient Requires assistance with daily self-care activities? No   Leisure Activity 1   3 Favorite Leisure Activities \"Reading\"   Frequency several times a year   Last time in the last 3 months   Barriers to participating  motivation  (\"I have been freaking out this past month. I stopped reading because life went upside down on me\")   Leisure Activity 2   [address]  \"drawing\"   Frequency  several times a year   Last time    (\"About 5 months ago\")   Barriers to participating  motivation  (Lack of time due to caring for mother and son)   Leisure Activity 3   Favorite Leisure Activities  \"Being out in nature. I love climbing trees\"   Frequency  several times a year   Last time    (\"Last summer\")   Barriers to participating  motivation  (Seasonal, lack of time)   Social   Patient reports spending the majority of their free time with one other person  (\"With my mom\")   Patient verbalizes a preference for spending free time alone  (\"I prefer to have me time\")   Patients perception of support system less healthy   Patients perception of barriers to socializing with others include(s) finances;lack of motivation/interest;lack of comfort   Social Details Support System:  \"My babies father\"   Beliefs & Coping   Has difficulty dealing with feelings   Yes   Internalizes feelings/Keeps feelings in Yes   Externalizes feelings through aggressiveness or poor temper control  Yes   Feels uncomfortable around others  No   Has difficulty talking to others  No   Depends on others for direction or decisions Yes   Difficulty dealing with anger of others  Yes   Difficulty dealing with own anger  Yes   Difficulty managing stress Yes   Frequently has difficulty with relationships  Yes   which,who,where in family;with friends/peers   Has recently perceived/experienced loss, disappointment, humiliation or failure  Yes  (\"All of the above\")   General perception about self

## 2021-01-22 NOTE — ED NOTES
Level of Care Disposition: admit to inpatient psychiatry      Patient was seen by ED provider and [facility]FFILI[facility] OF [facility] staff. The case presented to psychiatric provider on-call Dr. Jesus Bryant. Based on the ED evaluation and information presented to the provider by this writer it was determined that inpatient hospitalization is the least restrictive environment for the patient at this time. The patient will be admitted to the inpatient unit. Admitting provider did not order suicide precautions based on no current suicidal thoughts.       RATIONALE FOR ADMISSION:   Patient has a mental illness: depression, anxiety and borderline personality   Patient at imminent risk of danger to self as demonstrated by having thoughts of self harm- cutting self  Patient has shown an inability to care for self demonstrated by poor appetite, 15 lb weight loss, poor sleep and not being able to function at work   AND they could benefit from psychiatric treatment        Insurance Pre certification Authorization: [address], RN  01/21/21 6880

## 2021-01-22 NOTE — GROUP NOTE
Group Therapy Note    Date: 1/22/2021    Group Start Time: 1100  Group End Time: 1150  Group Topic: Psychoeducation    [address] [place] [address]        Group Therapy Note    Attendees: 9    Patient's Goal: to create a self-love mantra and practice utilizing it, by completing a mantra guided meditation practice. To discuss using mantra's to promote positive self-talk, self-love, remembering your purpose, and focusing on positivity, when feeling overwhelmed with a negative thought or experience. Notes: Kory Rasheed created a self-love mantra art piece. Pt shared with peers her mantra is \"like a tree, I will bend, but never break. \" Pt completed a self-love mantra guided meditation practice. Pt voiced understanding of education topic. Pt met group goal.     Status After Intervention:  Improved    Participation Level:  Active Listener and Interactive    Participation Quality: Appropriate, Attentive and Sharing      Speech:  normal      Thought Process/Content: Logical      Affective Functioning: Congruent      Mood: euthymic      Level of consciousness:  Alert, Oriented x4 and Attentive      Response to Learning: Able to verbalize current knowledge/experience, Able to verbalize/acknowledge new learning and Progressing to goal      Endings: None Reported    Modes of Intervention: Education, Support, Socialization, Exploration and Activity      Discipline Responsible: Psychoeducational Specialist      Signature:  Ron Patel, [address]

## 2021-01-22 NOTE — PLAN OF CARE
Problem: Pain:  Goal: Pain level will decrease  Description: Pain level will decrease  Outcome: Met This Shift     Problem: Suicide risk  Goal: Provide patient with safe environment  Description: Provide patient with safe environment  Outcome: Met This Shift     Problem: Altered Mood, Depressive Behavior:  Goal: Ability to disclose and discuss suicidal ideas will improve  Description: Ability to disclose and discuss suicidal ideas will improve  Outcome: Met This Shift     Problem: Altered Mood, Depressive Behavior:  Goal: Absence of self-harm  Description: Absence of self-harm  Outcome: Met This Shift     Problem: Altered Mood, Manic Behavior:  Goal: Able to sleep  Description: Able to sleep  Outcome: Ongoing  Note: Educated on new medication to aid in decreasing restlessness. Problem: Altered Mood, Manic Behavior:  Goal: Able to verbalize decrease in frequency and intensity of racing thoughts  Description: Able to verbalize decrease in frequency and intensity of racing thoughts  Outcome: Ongoing  Note: Starting new medication to aid in decreasing racing thoughts and to feel more organized. Receptive in medication.

## 2021-01-22 NOTE — BH NOTE
Patient denies SI currently. Patient reports history self harming behaviors but no intent of killing herself, just to \"release the pain. \" Patient CFS on unit, states, \"I'm not going to hurt myself here. \" Discontinued suicide precautions per Dr. Nereida Crews order.

## 2021-01-22 NOTE — H&P
Hospital Medicine History & Physical      PCP: Megha Caldera    Date of Admission: 1/21/2021    Date of Service: Pt seen/examined on 1/22/2021    Chief Complaint:    Chief Complaint   Patient presents with   [facility] Psychiatric Evaluation     having thoughts of hurting self.  states put a knife to her leg but did not break skin. brought in by [facility]         History Of Present Illness: The patient is a 31 y.o. female with history of anxiety, migraines, and depression who presented to [facility] for SI. Patient was seen and evaluated in the ED by the ED medical provider, patient was medically cleared for admission to [facility] at [facility]. This note serves as an admission medical H&P. Tobacco use: 1/3 PPD  ETOH use: occ  Illicit drug use: yes, marijuana UDS Cannabis +    Patient denies any medical complaints     Past Medical History:        Diagnosis Date    Anxiety     Asthma     Depression     IBS (irritable bowel syndrome)     Migraine     Tachycardia     causes fainting       Past Surgical History:        Procedure Laterality Date    APPENDECTOMY      COLONOSCOPY      DILATION AND CURETTAGE OF UTERUS      OTHER SURGICAL HISTORY      pt states she had pre-cancerous cells removed from uterus    UPPER GASTROINTESTINAL ENDOSCOPY         Medications Prior to Admission:    Prior to Admission medications    Not on File       Allergies:  Penicillins    Social History:  The patient currently lives home    TOBACCO:   reports that she has been smoking cigarettes. She has been smoking about 0.50 packs per day. She has never used smokeless tobacco.  ETOH:   reports current alcohol use.       Family History:   Positive as follows:        Problem Relation Age of Onset    Mental Illness Mother        REVIEW OF SYSTEMS:     Constitutional: Negative for fever   HENT: Negative for sore throat   Eyes: Negative for redness   Respiratory: Negative  for dyspnea, cough   Cardiovascular: Negative for chest pain Gastrointestinal: Negative for vomiting, diarrhea   Genitourinary: Negative for hematuria   Musculoskeletal: Negative for arthralgias   Skin: Negative for rash   Neurological: Negative for syncope  +headache   Hematological: Negative for easy bruising/bleeding   Psychiatric/Behavorial: Per psychiatry team evaluation     PHYSICAL EXAM:    BP (!) 100/39   Pulse 70   Temp 97.8 °F (36.6 °C) (Temporal)   Resp 16   Ht 5' 5\" (1.651 m)   Wt 109 lb (49.4 kg)   LMP 01/01/2021   SpO2 96%   BMI 18.14 kg/m²     Gen: No distress. Alert. Eyes: PERRL. No sclera icterus. No conjunctival injection. ENT: No discharge. Pharynx clear. Neck: No JVD. No Carotid Bruit. Trachea midline. Resp: No accessory muscle use. No crackles. No wheezes. No rhonchi. CV: Regular rate. Regular rhythm. No murmur. No rub. No edema. GI: Non-tender. Non-distended. Normal bowel sounds. Skin: Warm and dry. No nodule on exposed extremities. No rash on exposed extremities. M/S: No cyanosis. No joint deformity. No clubbing. Neuro: Awake. No focal neurologic deficit on exam.  Cranial nerves II through XII intact. Patient is able to ambulate without difficulty. Psych: Per psychiatry team evaluation     CBC:   Recent Labs     01/21/21 1941   WBC 6.4   HGB 13.2   HCT 38.4   .4*        BMP:   Recent Labs     01/21/21 1941      K 3.4*      CO2 22   BUN 12   CREATININE 0.8     LIVER PROFILE:   Recent Labs     01/21/21 1941   AST 14*   ALT 12   BILITOT 0.5   ALKPHOS 37*     PT/INR: No results for input(s): PROTIME, INR in the last 72 hours. APTT: No results for input(s): APTT in the last 72 hours.   UA:  Recent Labs     01/21/21 1945   COLORU Yellow   PHUR 7.0  7.0   WBCUA 3-5   RBCUA 3-4   MUCUS Rare*   BACTERIA 1+*   CLARITYU Clear   SPECGRAV 1.020   LEUKOCYTESUR Negative   UROBILINOGEN 0.2   BILIRUBINUR Negative   BLOODU MODERATE*   GLUCOSEU Negative        U/A:    Lab Results   Component Value Date COLORU Yellow 01/21/2021    WBCUA 3-5 01/21/2021    RBCUA 3-4 01/21/2021    MUCUS Rare 01/21/2021    BACTERIA 1+ 01/21/2021    CLARITYU Clear 01/21/2021    SPECGRAV 1.020 01/21/2021    LEUKOCYTESUR Negative 01/21/2021    BLOODU MODERATE 01/21/2021    GLUCOSEU Negative 01/21/2021       CULTURES  SARS-CoV-2, NAAT Not Detected        EKG:  I have reviewed the EKG with the following interpretation:   None     RADIOLOGY  No orders to display       ASSESSMENT/PLAN:  MDD  - per psychiatry team    Hypokalemia  -3.4 - replaced with 20 mEq    Hx of Migraines  - occasional headache, stated that she had a concussion a couple weeks ago and has followed up with PCP since then. Headaches have overall improved. Cannabis Abuse  - UDS +- pt stated she uses everyday  - counseled cessation     Tobacco Dependence  -Recommended cessation  - nicotine patch ordered- pt refusing     IBS  - patient stated that she isn't on any medication for this, she stated she uses marijuana which helps this. Pt has no medical complaints at this time. They were informed that should a medical concern arise during their admission they may have [facility] contact us.         Fermin Hopkins, APRN - CNP   1/22/2021

## 2021-01-22 NOTE — ED NOTES
Patent given Potassium Chloride 20 meq and explained to patient. She was offered tylenol for headache which she declined. She stated that it was just from crying too much and emotional which tylenol will not help.       Alyse Dickens RN  01/21/21 1328
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
## 2021-01-22 NOTE — GROUP NOTE
Group Therapy Note    Date: 1/22/2021    Group Start Time: 1000  Group End Time: 5476  Group Topic: Music Therapy    [address] [facility]        Group Therapy Note    Topic: Anxiety Management - Warning Signs, Coping Strategies, Forward-thinking    Mode of Intervention: Marge Analysis & Creative Writing    Attendees: 9         Patient's Goal:  Patient will identify personal behaviors associated with increasing anxiety, will collaborate with peers to process coping strategies and goals for future anxiety management post-D/C. Notes:  Patient present across group interventions, requesting to engage in alternative/holistic practices and receive resources for engagement in \"hippie therapy\".     Status After Intervention:  Improved    Participation Level: Interactive    Participation Quality: Appropriate and Attentive      Speech:  normal      Thought Process/Content: Logical      Affective Functioning: Congruent      Mood: euthymic      Level of consciousness:  Alert and Attentive      Response to Learning: Able to verbalize/acknowledge new learning, Capable of insight and Progressing to goal      Endings: None Reported    Modes of Intervention: Support, Socialization, Exploration, Activity and Media      Discipline Responsible: Psychoeducational Specialist      Signature:  Ok Mooney MM, MT-BC
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
## 2021-01-22 NOTE — GROUP NOTE
Group Therapy Note    Date: 1/22/2021    Group Start Time: 1330  Group End Time: 1430  Group Topic: [address]        Group Therapy Note    Attendees: 7    Patient's Goal: to engage in identifying negative and positive coping skills with peers and engage in fawn analysis to increase identification of coping skills, encourage utilization of positive coping skills, and increase socialization. Notes:  Shade Fernanda actively engaged in group throughout. Pt engaged in processing discussion and received a list of positive coping skills as reference. Status After Intervention:  Improved    Participation Level:  Active Listener and Interactive    Participation Quality: Appropriate, Attentive and Sharing      Speech:  normal      Thought Process/Content: Logical  Linear      Affective Functioning: Constricted/Restricted      Mood: anxious      Level of consciousness:  Alert, Oriented x4 and Attentive      Response to Learning: Able to verbalize current knowledge/experience, Able to verbalize/acknowledge new learning, Capable of insight and Progressing to goal      Endings: None Reported    Modes of Intervention: Education, Support, Socialization, Clarifying, Problem-solving, Activity and Media      Discipline Responsible: Psychoeducational Specialist      Signature:  CHAN Alvarez

## 2021-01-23 PROCEDURE — 6370000000 HC RX 637 (ALT 250 FOR IP): Performed by: PSYCHIATRY & NEUROLOGY

## 2021-01-23 PROCEDURE — 99233 SBSQ HOSP IP/OBS HIGH 50: CPT | Performed by: PSYCHIATRY & NEUROLOGY

## 2021-01-23 PROCEDURE — 1240000000 HC EMOTIONAL WELLNESS R&B

## 2021-01-23 RX ORDER — TRAZODONE HYDROCHLORIDE 100 MG/1
100 TABLET ORAL NIGHTLY
Status: DISCONTINUED | OUTPATIENT
Start: 2021-01-23 | End: 2021-01-27

## 2021-01-23 RX ADMIN — TRAZODONE HYDROCHLORIDE 100 MG: 100 TABLET ORAL at 21:05

## 2021-01-23 RX ADMIN — PRAZOSIN HYDROCHLORIDE 1 MG: 1 CAPSULE ORAL at 21:43

## 2021-01-23 RX ADMIN — HYDROXYZINE PAMOATE 50 MG: 50 CAPSULE ORAL at 23:46

## 2021-01-23 NOTE — PLAN OF CARE
Problem: Pain:  Goal: Pain level will decrease  Outcome: Ongoing     Problem: Pain:  Goal: Control of acute pain  Outcome: Ongoing     Problem: Pain:  Goal: Control of chronic pain  Outcome: Ongoing     Problem: Suicide risk  Goal: Provide patient with safe environment  Outcome: Ongoing     Problem: Altered Mood, Depressive Behavior:  Goal: Ability to disclose and discuss suicidal ideas will improve  Outcome: Ongoing     Problem: Altered Mood, Depressive Behavior:  Goal: Absence of self-harm  1/23/2021 1347 by Niurka Marques RN  Outcome: Ongoing     Problem: Altered Mood, Manic Behavior:  Goal: Able to sleep  Outcome: Ongoing     Problem: Altered Mood, Manic Behavior:  Goal: Able to verbalize decrease in frequency and intensity of racing thoughts  Outcome: Ongoing     Problem: Nutrition  Goal: Optimal nutrition therapy  Outcome: Ongoing     Problem: Nutrition  Goal: Understanding of nutritional guidelines  Outcome: Ongoing

## 2021-01-23 NOTE — FLOWSHEET NOTE
01/23/21 1338   Status and Exam   Normal No   Facial Expression Elevated   Affect Congruent   Level of Consciousness Alert   Mood:Normal No   Mood Anxious   Motor Activity:Normal Yes   Interview Behavior Cooperative   Preception [place] to Person;[place] to Time;[place] to Place;[place] to Situation   Attention:Normal Yes   Thought Processes Circumstantial   Thought Content:Normal Yes   Hallucinations None   Delusions No   Memory:Normal No   Memory Poor Remote   Insight and Judgment No   Insight and Judgment Poor Judgment;Poor Insight   Present Suicidal Ideation No   Present Homicidal Ideation No

## 2021-01-23 NOTE — PROGRESS NOTES
Comprehensive Nutrition Assessment    Type and Reason for Visit:  Initial, Positive Nutrition Screen(+ screen for poor appetite and MST = 2)    Nutrition Recommendations/Plan:   1. Continue general diet order - please document meal intake % or refusal of meals in flow sheets for each meal.   2. Added Ensure Clear with meals - please document ONS intake % or refusal in flow sheets. 3. Monitor appetite, meal intake, acceptance/intake of ONS, and GI distress. 4. Please obtain an actual, current weight for this patient - CBW is stated and past weight hx is stated. 5. Monitor bowel function and weight trends + mental status. Nutrition Assessment:  patient is nutritionally compromised AEB poor appetite/po intake PTA r/t mental health decline and life stressors contributing to mental health decline and she is at risk for further compromise d/t nausea + emesis early this am, decreased appetite/po intake, and patient reported recent diagnosis of borderline personality d/o; will continue general diet order and add Ensure Clear with meals    Malnutrition Assessment:  Malnutrition Status: At risk for malnutrition    Context:  Social/Environmental Circumstances     Findings of the 6 clinical characteristics of malnutrition:  Energy Intake:  Mild decrease in energy intake (Comment)(PTA - unspecified amount of time)  Weight Loss:  Unable to assess(weight hx is stated and CBW is stated)     Body Fat Loss:  Unable to assess     Muscle Mass Loss:  Unable to assess    Fluid Accumulation:  No significant fluid accumulation     Strength:  Not Performed    Estimated Daily Nutrient Needs:  Energy (kcal):  1400 - 1600 kcals based on 28-32 kcals/kg/CBW; Weight Used for Energy Requirements:  Current(stated weight of 109#)     Protein (g):  40 - 50 g protein based on 0.8-1.0 g/kg/CBW;  Weight Used for Protein Requirements:  Current(stated weight of 109#)        Fluid (ml/day):  1400 - 1600 ml; Method Used for Fluid Data, Nausea or Vomiting, GI Status, Meal Time Behavior, Nutrition Focused Physical Findings, Skin, Weight     Discharge Planning:    Continue current diet     Electronically signed by Asa Lea RD, LD on 1/23/21 at 1:24 PM EST    Contact: [phone]

## 2021-01-23 NOTE — GROUP NOTE
Group Therapy Note    Date: 1/23/2021    Group Start Time: 1030  Group End Time: 1130  Group Topic: Cognitive Skills    [id number] [facility]        Group Therapy Note    Attendees: 3       Patient's Goal:  Patient will increase knowledge about socialization and leisure activities    Notes:  Patients participated in social bingo and identified leisure activities that she can engage in to decrease symptoms      Status After Intervention:  Improved    Participation Level:  Active Listener    Participation Quality: Appropriate      Speech:  normal      Thought Process/Content: Logical      Affective Functioning: Congruent      Mood: depressed      Level of consciousness:  Alert and Oriented x4      Response to Learning: Able to verbalize current knowledge/experience and Progressing to goal      Endings: None Reported    Modes of Intervention: Education      Discipline Responsible: /Counselor      Signature:  MICHELLE Denney

## 2021-01-23 NOTE — BH NOTE
Nursing student approached this writer and informed me, during her assessment of pt pt stated she was not suicidal now, but if she left she would become suicidal and would walk out into traffic.  Will monitor

## 2021-01-23 NOTE — BH NOTE
Continues to have nausea with vomiting. B/P 99/58 HR 51. Notified provider. Temp 97.3  New orders received.

## 2021-01-23 NOTE — BH NOTE
Pt states she is using [employer] _ [address].  Per the company website, primary ingredients include:    [employer]

## 2021-01-23 NOTE — PLAN OF CARE
Nutrition Problem #1: Inadequate oral intake  Intervention: Food and/or Nutrient Delivery: Continue Current Diet, Start Oral Nutrition Supplement  Nutritional Goals: patient will consume as much po intake as she can tolerate without GI distress (N/V)

## 2021-01-23 NOTE — PROGRESS NOTES
Patient continues to vomit moderate amount of emesis. Placed call to Dr. Marie Canavan and received new order for IM Phenergan 20 mg every 6 hours PRN.

## 2021-01-23 NOTE — GROUP NOTE
Group Therapy Note    Date: 1/23/2021    Group Start Time: 0100  Group End Time: 0215  Group Topic: Cognitive Skills    [facility]    Shira Siddiqui, [facility]        Group Therapy Note    Attendees: 4       Patient's Goal:  Pt's goal was to identify what Pt needs to have a healthy mind, healthy relationships, healthy spirits and healthy body.       Notes:  Pt was engaged in group. Pt participated in group discussion and activity. Pt shared that she struggles with caring for her mom and herself. Pt met goal.     Status After Intervention:  Unchanged    Participation Level:  Active Listener and Interactive    Participation Quality: Appropriate, Attentive and Sharing      Speech:  normal      Thought Process/Content: Logical  Linear      Affective Functioning: Congruent      Mood: anxious and depressed      Level of consciousness:  Alert, Oriented x4 and Attentive      Response to Learning: Able to verbalize current knowledge/experience and Progressing to goal      Endings: None Reported    Modes of Intervention: Education, Support, Socialization, Exploration, Clarifying, Problem-solving, Activity, Movement, Confrontation, Limit-setting and Reality-testing      Discipline Responsible: /Counselor      Signature:  Shira Siddiqui, [facility]

## 2021-01-23 NOTE — PROGRESS NOTES
Department of Psychiatry  AttendingProgress Note  Chief Complaint: depression  Jill Keith developed N/V yesterday after taking her first Abilify dose. Had Zofran with minimal effect and then IM Phenergan which was helpful. Sh eis difficult as she is focused on avoiding medication as she prefers holistic items such as her [employer] supplements which have St, Casey wort, Gingko, and other items. Will DC Abilify . Will not prescribe any new meds as I believe that she'll have side effects. Patient's chart was reviewed and collaborated with  about the treatment plan. SUBJECTIVE:    Patient is feeling unchanged. Suicidal ideation:  denies suicidal ideation. Patient does not have medication side effects. ROS: Patient has new complaints: no  Sleeping adequately:  Yes   Appetite adequate: Yes  Attending groups: Yes  Visitors:No    OBJECTIVE    Physical  VITALS:  /68   Pulse 109   Temp 98.9 °F (37.2 °C) (Temporal)   Resp 16   Ht 5' 5\" (1.651 m)   Wt 109 lb (49.4 kg)   LMP 01/01/2021   SpO2 97%   BMI 18.14 kg/m²     Mental Status Examination:  Patients appearance was street clothes. Thoughts are Goal directed. Homicidal ideations none. No abnormal movements, tics or mannerisms. Memory intact Aims 0. Concentration Fair. Alert and oriented X 4. Insight and Judgement impaired insight. Patient was cooperative.  Patient gait normal. Mood anxious, affect anxiety Hallucinations Absent, suicidal ideations no specific plan to harm self Speech normal volume  Data  Labs:   Admission on 01/21/2021   Component Date Value Ref Range Status    WBC 01/21/2021 6.4  4.0 - 11.0 K/uL Final    RBC 01/21/2021 3.82* 4.00 - 5.20 M/uL Final    Hemoglobin 01/21/2021 13.2  12.0 - 16.0 g/dL Final    Hematocrit 01/21/2021 38.4  36.0 - 48.0 % Final    MCV 01/21/2021 100.4* 80.0 - 100.0 fL Final    MCH 01/21/2021 34.6* 26.0 - 34.0 pg Final    MCHC 01/21/2021 34.4  31.0 - 36.0 g/dL Final    RDW 01/21/2021 12.3* 12.4 - 15.4 % Final    Platelets 28/42/6315 178  135 - 450 K/uL Final    MPV 01/21/2021 8.3  5.0 - 10.5 fL Final    Neutrophils % 01/21/2021 62.8  % Final    Lymphocytes % 01/21/2021 29.2  % Final    Monocytes % 01/21/2021 5.3  % Final    Eosinophils % 01/21/2021 1.8  % Final    Basophils % 01/21/2021 0.9  % Final    Neutrophils Absolute 01/21/2021 4.0  1.7 - 7.7 K/uL Final    Lymphocytes Absolute 01/21/2021 1.9  1.0 - 5.1 K/uL Final    Monocytes Absolute 01/21/2021 0.3  0.0 - 1.3 K/uL Final    Eosinophils Absolute 01/21/2021 0.1  0.0 - 0.6 K/uL Final    Basophils Absolute 01/21/2021 0.1  0.0 - 0.2 K/uL Final    Ethanol Lvl 01/21/2021 None Detected  mg/dL Final    Comment:    None Detected  Conversion factor:  100 mg/dl = .100 g/dl  For Medical Purposes Only      Color, UA 01/21/2021 Yellow  Straw/Yellow Final    Clarity, UA 01/21/2021 Clear  Clear Final    Glucose, Ur 01/21/2021 Negative  Negative mg/dL Final    Bilirubin Urine 01/21/2021 Negative  Negative Final    Ketones, Urine 01/21/2021 Negative  Negative mg/dL Final    Specific Gravity, UA 01/21/2021 1.020  1.005 - 1.030 Final    Blood, Urine 01/21/2021 MODERATE* Negative Final    pH, UA 01/21/2021 7.0  5.0 - 8.0 Final    Protein, UA 01/21/2021 Negative  Negative mg/dL Final    Urobilinogen, Urine 01/21/2021 0.2  <2.0 E.U./dL Final    Nitrite, Urine 01/21/2021 Negative  Negative Final    Leukocyte Esterase, Urine 01/21/2021 Negative  Negative Final    Microscopic Examination 01/21/2021 YES   Final    Urine Type 01/21/2021 NotGiven   Final    Urine received in a container without preservatives.     Sodium 01/21/2021 138  136 - 145 mmol/L Final    Potassium 01/21/2021 3.4* 3.5 - 5.1 mmol/L Final    Chloride 01/21/2021 107  99 - 110 mmol/L Final    CO2 01/21/2021 22  21 - 32 mmol/L Final    Anion Gap 01/21/2021 9  3 - 16 Final    Glucose 01/21/2021 112* 70 - 99 mg/dL Final    BUN 01/21/2021 12  7 - 20 mg/dL Final    01/21/2021 18  Not Established mg/dL Final    Hemoglobin A1C 01/21/2021 4.6  See comment % Final    Comment: Comment:  Diagnosis of Diabetes: > or = 6.5%  Increased risk of diabetes (Prediabetes): 5.7-6.4%  Glycemic Control: Nonpregnant Adults: <7.0%                    Pregnant: <6.0%        eAG 01/21/2021 85.3  mg/dL Final    TSH 01/21/2021 0.67  0.27 - 4.20 uIU/mL Final            Medications  Current Facility-Administered Medications: traZODone (DESYREL) tablet 100 mg, 100 mg, Oral, Nightly  prazosin (MINIPRESS) capsule 1 mg, 1 mg, Oral, Nightly  promethazine (PHENERGAN) injection 25 mg, 25 mg, Intramuscular, Q6H PRN  acetaminophen (TYLENOL) tablet 650 mg, 650 mg, Oral, Q4H PRN  ibuprofen (ADVIL;MOTRIN) tablet 400 mg, 400 mg, Oral, Q6H PRN  magnesium hydroxide (MILK OF MAGNESIA) 400 MG/5ML suspension 30 mL, 30 mL, Oral, Daily PRN  hydrOXYzine (VISTARIL) capsule 50 mg, 50 mg, Oral, TID PRN  nicotine (NICODERM CQ) 21 MG/24HR 1 patch, 1 patch, Transdermal, Daily  nicotine polacrilex (COMMIT) lozenge 2 mg, 2 mg, Oral, Q2H PRN  acetaminophen (TYLENOL) tablet 1,000 mg, 1,000 mg, Oral, Once    ASSESSMENT AND PLAN    Principal Problem:    Severe episode of recurrent major depressive disorder, without psychotic features ([facility] [address]  Active Problems:    Cannabis abuse    Borderline personality disorder in adult [facility])    Suicidal ideation  Resolved Problems:    * No resolved hospital problems. *       1. Patient s symptoms   show no change  2. Probable discharge is next week  3. Discharge planning is incomplete  4. Suicidal ideation is none  5. Total time with patient was 40 minutes and more than 50 % of that time was spent counseling the patient on their symptoms, treatment and expected goals.

## 2021-01-24 PROCEDURE — 6370000000 HC RX 637 (ALT 250 FOR IP): Performed by: PHYSICIAN ASSISTANT

## 2021-01-24 PROCEDURE — 1240000000 HC EMOTIONAL WELLNESS R&B

## 2021-01-24 PROCEDURE — 6370000000 HC RX 637 (ALT 250 FOR IP): Performed by: PSYCHIATRY & NEUROLOGY

## 2021-01-24 RX ORDER — FAMOTIDINE 20 MG/1
20 TABLET, FILM COATED ORAL 2 TIMES DAILY
Status: DISCONTINUED | OUTPATIENT
Start: 2021-01-24 | End: 2021-01-29 | Stop reason: HOSPADM

## 2021-01-24 RX ORDER — LORAZEPAM 1 MG/1
1 TABLET ORAL
Status: COMPLETED | OUTPATIENT
Start: 2021-01-24 | End: 2021-01-24

## 2021-01-24 RX ADMIN — PRAZOSIN HYDROCHLORIDE 1 MG: 1 CAPSULE ORAL at 21:07

## 2021-01-24 RX ADMIN — FAMOTIDINE 20 MG: 20 TABLET ORAL at 21:07

## 2021-01-24 RX ADMIN — TRAZODONE HYDROCHLORIDE 100 MG: 100 TABLET ORAL at 21:07

## 2021-01-24 RX ADMIN — FAMOTIDINE 20 MG: 20 TABLET ORAL at 14:16

## 2021-01-24 RX ADMIN — LORAZEPAM 1 MG: 1 TABLET ORAL at 15:01

## 2021-01-24 NOTE — BH NOTE
Pt has hx guilherme. Pt co significant gi distress and discomfort, cramping and loose stool, following consumption of afternoon meal and nutritional supplement. Pt appeared anxious and obsessing over the fact that \"I just cant eat like that, they made me drink that whole ensure. Provider contacted, ordered 1x dose of ativan per MAR.

## 2021-01-24 NOTE — PLAN OF CARE
Problem: Altered Mood, Manic Behavior:  Goal: Able to verbalize decrease in frequency and intensity of racing thoughts  Description: Able to verbalize decrease in frequency and intensity of racing thoughts  Outcome: Ongoing  Met with client to complete assessment. Reports taking a nap, \" I couldn't fall asleep till after 2 am. I had some night sweating and weird dreams. \"   Reports feeling some sorrow and despair. Reports feeling anxious about leaving here. \" I was taking those supplements and it had [place] wort. I dont feel balanced. \" client reports feeling unprepared and unstable. Denies SI/HI. Denies any thoughts of self harm. Client brightens with interaction. Plans to attend group.

## 2021-01-24 NOTE — BH NOTE
[facility]  Day 3 Interdisciplinary Treatment Plan NOTE    Review Date & Time: 01/24/2021 0900    Patient was not in treatment team    Admission Type:   Admission Type: Voluntary    Reason for admission:  Reason for Admission: SI, unable to care for self  Estimated Length of Stay Update:  1-3 days   Estimated Discharge Date Update: 1-3 days     PATIENT STRENGTHS:  Patient Strengths Strengths: No significant Physical Illness, Positive Support  Patient Strengths and Limitations:Limitations: Multiple barriers to leisure interests, Difficult relationships / poor social skills, Difficulty problem solving/relies on others to help solve problems  Addictive Behavior:Addictive Behavior  In the past 3 months, have you felt or has someone told you that you have a problem with:  : None  Do you have a history of Chemical Use?: No  Do you have a history of Alcohol Use?: No  Do you have a history of Street Drug Abuse?: No  Histroy of Prescripton Drug Abuse?: No  Medical Problems:  Past Medical History:   Diagnosis Date    Anxiety     Asthma     Depression     IBS (irritable bowel syndrome)     Migraine     Tachycardia     causes fainting       Risk:  Fall RiskTotal: 89  Cholo Scale Cholo Scale Score: 22  BVC Total: 0  Change in scores NO.  Changes to plan of Care No    Status EXAM:   Status and Exam  Normal: No  Facial Expression: Elevated  Affect: Congruent  Level of Consciousness: Alert  Mood:Normal: No  Mood: Anxious  Motor Activity:Normal: Yes  Motor Activity: Decreased  Interview Behavior: Cooperative  Preception: [place] to Person, [place] to Time, [place] to Place, [place] to Situation  Attention:Normal: Yes  Attention: Distractible  Thought Processes: Circumstantial  Thought Content:Normal: Yes  Thought Content: Preoccupations  Hallucinations: None  Delusions: No  Memory:Normal: No  Memory: Poor Remote  Insight and Judgment: No  Insight and Judgment: Poor Judgment, Poor Insight  Present Suicidal Ideation: No  Present Homicidal Ideation: No    Daily Assessment Last Entry:   Daily Sleep (WDL): Exceptions to WDL         Patient Currently in Pain: Denies  Daily Nutrition (WDL): Within Defined Limits    Patient Monitoring:  Frequency of Checks: 4 times per hour, close    Psychiatric Symptoms:   Depression Symptoms  Depression Symptoms: Isolative  Anxiety Symptoms  Anxiety Symptoms: Generalized  Merle Symptoms  Merle Symptoms: Poor judgment     Psychosis Symptoms  Delusion Type: No problems reported or observed. Suicide Risk CSSR-S:  1) Within the past month, have you wished you were dead or wished you could go to sleep and not wake up? : Yes  2) Have you actually had any thoughts of killing yourself? : Yes  3) Have you been thinking about how you might kill yourself? : No  5) Have you started to work out or worked out the details of how to kill yourself? Do you intend to carry out this plan? : No  6) Have you ever done anything, started to do anything, or prepared to do anything to end your life?: No  Change in Result YES Change in Plan of care NO      EDUCATION:   Learner Progress Toward Treatment Goals: Reviewed results and recommendations of this team    Method: Individual    Outcome: Demonstrated Understanding    PATIENT GOALS: \" attend group. \"     PLAN/TREATMENT RECOMMENDATIONS UPDATE: maintain safety, medication management     GOALS UPDATE:   Time frame for Short-Term Goals:  By time of discharge       Young Wakefield RN

## 2021-01-24 NOTE — PLAN OF CARE
Problem: Altered Mood, Depressive Behavior:  Goal: Absence of self-harm  Description: Absence of self-harm  1/24/2021 0126 by Karely Edwards RN  Outcome: Ongoing   George Face has been out in the day room and social with select peers this shift. Patient attended group and denies current SI/HI. Denies A/V/H and remains absent of self harm. Patient refused scheduled minipress this shift stating \" I am afraid it will react with the [address] I have been taking. \" PRN Trazodone requested/given for sleep. PRN Vistaril requested/given for anxiety. Medications have not been effective this shift. Patient later decided to take the minipress stating \"I hope it will stop my thoughts from racing, so I can get some sleep. \" Will monitor.

## 2021-01-24 NOTE — GROUP NOTE
Group Therapy Note    Date: 1/24/2021    Group Start Time: 2411  Group End Time: 1130  Group Topic: Cognitive Skills    [facility]    Shira Siddiqui, [facility]        Group Therapy Note    Attendees: 5     Patient's Goal:  Pt's goal was to learn the 7 Habits of Happy People and how to add the habits to Pt's life. Notes:  Pt was engaged in group. Pt participated in group discussion. Pt met goal.       Status After Intervention:  Improved    Participation Level:  Active Listener and Interactive    Participation Quality: Appropriate, Attentive, Sharing and Supportive      Speech:  normal      Thought Process/Content: Logical  Linear      Affective Functioning: Congruent      Mood: anxious      Level of consciousness:  Alert, Oriented x4 and Attentive      Response to Learning: Able to verbalize current knowledge/experience and Progressing to goal      Endings: None Reported    Modes of Intervention: Education, Support, Socialization, Exploration, Clarifying, Problem-solving, Activity, Movement, Confrontation, Limit-setting and Reality-testing      Discipline Responsible: /Counselor      Signature:  Shira Siddiqui, [facility]

## 2021-01-25 PROCEDURE — 6370000000 HC RX 637 (ALT 250 FOR IP): Performed by: PSYCHIATRY & NEUROLOGY

## 2021-01-25 PROCEDURE — 99233 SBSQ HOSP IP/OBS HIGH 50: CPT | Performed by: PSYCHIATRY & NEUROLOGY

## 2021-01-25 PROCEDURE — 6370000000 HC RX 637 (ALT 250 FOR IP): Performed by: PHYSICIAN ASSISTANT

## 2021-01-25 PROCEDURE — 97535 SELF CARE MNGMENT TRAINING: CPT

## 2021-01-25 PROCEDURE — 97165 OT EVAL LOW COMPLEX 30 MIN: CPT

## 2021-01-25 PROCEDURE — 1240000000 HC EMOTIONAL WELLNESS R&B

## 2021-01-25 RX ORDER — BUSPIRONE HYDROCHLORIDE 5 MG/1
5 TABLET ORAL 3 TIMES DAILY
Status: DISCONTINUED | OUTPATIENT
Start: 2021-01-25 | End: 2021-01-28

## 2021-01-25 RX ORDER — LACTOBACILLUS RHAMNOSUS GG 10B CELL
1 CAPSULE ORAL
Status: DISCONTINUED | OUTPATIENT
Start: 2021-01-26 | End: 2021-01-29 | Stop reason: HOSPADM

## 2021-01-25 RX ORDER — LORAZEPAM 1 MG/1
1 TABLET ORAL ONCE
Status: COMPLETED | OUTPATIENT
Start: 2021-01-25 | End: 2021-01-25

## 2021-01-25 RX ORDER — LORAZEPAM 1 MG/1
1 TABLET ORAL EVERY 6 HOURS PRN
Status: DISCONTINUED | OUTPATIENT
Start: 2021-01-25 | End: 2021-01-29 | Stop reason: HOSPADM

## 2021-01-25 RX ADMIN — LORAZEPAM 1 MG: 1 TABLET ORAL at 12:19

## 2021-01-25 RX ADMIN — FAMOTIDINE 20 MG: 20 TABLET ORAL at 08:41

## 2021-01-25 RX ADMIN — TRAZODONE HYDROCHLORIDE 100 MG: 100 TABLET ORAL at 23:30

## 2021-01-25 RX ADMIN — FAMOTIDINE 20 MG: 20 TABLET ORAL at 21:02

## 2021-01-25 RX ADMIN — HYDROXYZINE PAMOATE 50 MG: 50 CAPSULE ORAL at 00:22

## 2021-01-25 RX ADMIN — BUSPIRONE HYDROCHLORIDE 5 MG: 5 TABLET ORAL at 21:02

## 2021-01-25 RX ADMIN — BUSPIRONE HYDROCHLORIDE 5 MG: 5 TABLET ORAL at 14:55

## 2021-01-25 RX ADMIN — HYDROXYZINE PAMOATE 50 MG: 50 CAPSULE ORAL at 23:30

## 2021-01-25 NOTE — PLAN OF CARE
Problem: Altered Mood, Depressive Behavior:  Goal: Absence of self-harm  Description: Absence of self-harm  Outcome: Ongoing    Shade Fernanda has been out in the day room and social with select peers this shift. Patient reports anxiety but reports her mood is improving. Patient medication compliant and attended group this shift. Absent of self harm.

## 2021-01-25 NOTE — BH NOTE
C/O of stomach cramping. \"That Ativan really helped, yesterday\". Explained that medication was a one time dose. Offered Vistaril and declined to take it. Offered a nicotine patch and declined it, \"4 days clean. I don't want a crutch to quit smoking\".

## 2021-01-25 NOTE — GROUP NOTE
Group Therapy Note    Date: 1/25/2021    Group Start Time: [employer]  Group End Time: 2803  Group Topic: Psychotherapy    [address] [facility]        Group Therapy Note    Attendees: 6         Patient's Goal:  Pt set goal of finding opportunities to express forgiveness of self. Notes:  Pt actively participated in group discussion and gave feedback to other group members. Pt reported that she has found new opportunities to express forgiveness of self throughout hospitalization. Received consistent feedback from peers across session Pt met her goal.    Status After Intervention:  Improved    Participation Level:  Active Listener and Interactive    Participation Quality: Appropriate, Sharing and Supportive      Speech:  normal      Thought Process/Content: Logical      Affective Functioning: Congruent      Mood: depressed      Level of consciousness:  Alert and Oriented x4      Response to Learning: Able to verbalize current knowledge/experience, Able to verbalize/acknowledge new learning, Able to retain information and Progressing to goal      Endings: None Reported    Modes of Intervention: Support, Socialization, Exploration, Clarifying and Problem-solving      Discipline Responsible: Psychoeducational Specialist      Signature:  Dario Dorsey MM, CHAN

## 2021-01-25 NOTE — BH NOTE
Group Therapy Note    Date: 1/24/2021  Start Time: 2030  End Time:  2100    Type of Group: Wrap-Up    Date: 1/24/2021  Start Time: 2030  End Time:  2100    Notes: concerned about not going to a group she requested about relationships. Stated her goal was to not get sick again. ,      Status After Intervention: Improved    Participation Level:  Active Listener    Participation Quality: Appropriate      Speech: Normal      Thought Process/Content: Logical      Affective Functioning: Congruent      Mood: anxious and depressed      Level of consciousness:  Alert and Oriented x4      Response to Learning: Progressing to goal      Discipline Responsible: [employer]      Signature:  Rosenda Keita

## 2021-01-25 NOTE — BH NOTE
Requested staff watch her food \"I don't want anyone to take it, I'm not done with it\". Requested she bring it to the team station. Ate a few bites and drank small amount of juice. Continuing to monitor intake.

## 2021-01-25 NOTE — GROUP NOTE
Group Therapy Note    Date: 1/25/2021    Group Start Time: 1000  Group End Time: 200  Group Topic: Art Therapy     [address]        Group Therapy Note    Attendees: 8         Patient's Goal:  Patients were invited to participate in an Art Therapy / Psycho-Education group on Radical Acceptance. Patient received a handout on Radical Acceptance and were invited to learn and discuss the concept. With therapist giving instructions, patients were asked to practice two radical acceptance DBT techniques: Willing Hands and Half-smile. Lastly, patients were asked to explore how they can practice radical acceptance in their lives using an art material of their choosing and were asked to share and process their work with the group. Notes:  Teagan Swan was active in group discussion, gave supportive feedback to peers, and created an art piece on what in life she could and could not control. Status After Intervention:  Improved    Participation Level:  Active Listener and Interactive    Participation Quality: Appropriate, Attentive, Sharing and Supportive      Speech:  normal      Thought Process/Content: Logical      Affective Functioning: Blunted      Mood: anxious and depressed      Level of consciousness:  Alert, Oriented x4 and Attentive      Response to Learning: Able to verbalize current knowledge/experience, Able to verbalize/acknowledge new learning, Able to retain information and Capable of insight      Endings: None Reported    Modes of Intervention: Education, Support, Socialization, Exploration, Clarifying, Problem-solving, Activity and Media      Discipline Responsible: Psychoeducational Specialist      Signature:  Willam Hall

## 2021-01-25 NOTE — BH NOTE
Reported the Ativan aided in decreasing the stomach cramping and anxiety. Was tearful at time of receiving medication and is no longer tearful.

## 2021-01-25 NOTE — PROGRESS NOTES
- 34.0 pg Final    MCHC 01/21/2021 34.4  31.0 - 36.0 g/dL Final    RDW 01/21/2021 12.3* 12.4 - 15.4 % Final    Platelets 85/45/5469 178  135 - 450 K/uL Final    MPV 01/21/2021 8.3  5.0 - 10.5 fL Final    Neutrophils % 01/21/2021 62.8  % Final    Lymphocytes % 01/21/2021 29.2  % Final    Monocytes % 01/21/2021 5.3  % Final    Eosinophils % 01/21/2021 1.8  % Final    Basophils % 01/21/2021 0.9  % Final    Neutrophils Absolute 01/21/2021 4.0  1.7 - 7.7 K/uL Final    Lymphocytes Absolute 01/21/2021 1.9  1.0 - 5.1 K/uL Final    Monocytes Absolute 01/21/2021 0.3  0.0 - 1.3 K/uL Final    Eosinophils Absolute 01/21/2021 0.1  0.0 - 0.6 K/uL Final    Basophils Absolute 01/21/2021 0.1  0.0 - 0.2 K/uL Final    Ethanol Lvl 01/21/2021 None Detected  mg/dL Final    Comment:    None Detected  Conversion factor:  100 mg/dl = .100 g/dl  For Medical Purposes Only      Color, UA 01/21/2021 Yellow  Straw/Yellow Final    Clarity, UA 01/21/2021 Clear  Clear Final    Glucose, Ur 01/21/2021 Negative  Negative mg/dL Final    Bilirubin Urine 01/21/2021 Negative  Negative Final    Ketones, Urine 01/21/2021 Negative  Negative mg/dL Final    Specific Gravity, UA 01/21/2021 1.020  1.005 - 1.030 Final    Blood, Urine 01/21/2021 MODERATE* Negative Final    pH, UA 01/21/2021 7.0  5.0 - 8.0 Final    Protein, UA 01/21/2021 Negative  Negative mg/dL Final    Urobilinogen, Urine 01/21/2021 0.2  <2.0 E.U./dL Final    Nitrite, Urine 01/21/2021 Negative  Negative Final    Leukocyte Esterase, Urine 01/21/2021 Negative  Negative Final    Microscopic Examination 01/21/2021 YES   Final    Urine Type 01/21/2021 NotGiven   Final    Urine received in a container without preservatives.     Sodium 01/21/2021 138  136 - 145 mmol/L Final    Potassium 01/21/2021 3.4* 3.5 - 5.1 mmol/L Final    Chloride 01/21/2021 107  99 - 110 mmol/L Final    CO2 01/21/2021 22  21 - 32 mmol/L Final    Anion Gap 01/21/2021 9  3 - 16 Final    Glucose 01/21/2021 112* 70 - 99 mg/dL Final    BUN 01/21/2021 12  7 - 20 mg/dL Final    CREATININE 01/21/2021 0.8  0.6 - 1.1 mg/dL Final    GFR Non- 01/21/2021 >60  >60 Final    Comment: >60 mL/min/1.73m2 EGFR, calc. for ages 25 and older using the  MDRD formula (not corrected for weight), is valid for stable  renal function.  GFR  01/21/2021 >60  >60 Final    Comment: Chronic Kidney Disease: less than 60 ml/min/1.73 sq.m. Kidney Failure: less than 15 ml/min/1.73 sq.m. Results valid for patients 18 years and older.  Calcium 01/21/2021 9.4  8.3 - 10.6 mg/dL Final    Total Protein 01/21/2021 7.0  6.4 - 8.2 g/dL Final    Alb 01/21/2021 4.5  3.4 - 5.0 g/dL Final    Albumin/Globulin Ratio 01/21/2021 1.8  1.1 - 2.2 Final    Total Bilirubin 01/21/2021 0.5  0.0 - 1.0 mg/dL Final    Alkaline Phosphatase 01/21/2021 37* 40 - 129 U/L Final    ALT 01/21/2021 12  10 - 40 U/L Final    AST 01/21/2021 14* 15 - 37 U/L Final    Globulin 01/21/2021 2.5  g/dL Final    Salicylate, Serum 47/55/7956 <0.3* 15.0 - 30.0 mg/dL Final    Comment: Therapeutic Range: 15.0-30.0 mg/dL  Toxic: >30.0 mg/dL      Acetaminophen Level 01/21/2021 <5* 10 - 30 ug/mL Final    Comment: Therapeutic Range: 10.0-30.0 ug/mL  Toxic: >=150 ug/mL      Amphetamine Screen, Urine 01/21/2021 Neg  Negative <1000ng/mL Final    Barbiturate Screen, Ur 01/21/2021 Neg  Negative <200 ng/mL Final    Benzodiazepine Screen, Urine 01/21/2021 Neg  Negative <200 ng/mL Final    Cannabinoid Scrn, Ur 01/21/2021 POSITIVE* Negative <50 ng/mL Final    Cocaine Metabolite Screen, Urine 01/21/2021 Neg  Negative <300 ng/mL Final    Opiate Scrn, Ur 01/21/2021 Neg  Negative <300 ng/mL Final    Comment: \"Therapeutic levels of pain medication, especially oxycontin and synthetic  opioids, may not be detected by this Methodology.  Pain management screen  panel  Drug panel-PM-Hi Res Ur, Interp (PAIN) should be considered for drug  monitoring \".  PCP Screen, Urine 01/21/2021 Neg  Negative <25 ng/mL Final    Methadone Screen, Urine 01/21/2021 Neg  Negative <300 ng/mL Final    Propoxyphene Scrn, Ur 01/21/2021 Neg  Negative <300 ng/mL Final    Oxycodone Urine 01/21/2021 Neg  Negative <100 ng/ml Final    pH, UA 01/21/2021 7.0   Final    Comment: Urine pH less than 5.0 or greater than 8.0 may indicate sample adulteration. Another sample should be collected if clinically  indicated.  Drug Screen Comment: 01/21/2021 see below   Final    Comment: This method is a screening test to detect only these drug  classes as part of a medical workup. Confirmatory testing  by another method should be ordered if clinically indicated.  SARS-CoV-2, NAAT 01/21/2021 Not Detected  Not Detected Final    Comment: Rapid NAAT:   Negative results should be treated as presumptive and,  if inconsistent with clinical signs and symptoms or necessary for  patient management, should be tested with an alternative molecular  assay. Negative results do not preclude SARS-CoV-2 infection and  should not be used as the sole basis for patient management decisions. This test has been authorized by the FDA under an Emergency Use  Authorization (EUA) for use by authorized laboratories.     Fact sheet for Healthcare Providers:  Fennicol[employer]Mart.fr  Fact sheet for Patients: FencingMart.fr    METHODOLOGY: Isothermal Nucleic Acid Amplification      hCG Qual 01/21/2021 Negative  Detects HCG level >10 MIU/mL Final    Mucus, UA 01/21/2021 Rare* None Seen /LPF Final    WBC, UA 01/21/2021 3-5  0 - 5 /HPF Final    RBC, UA 01/21/2021 3-4  0 - 4 /HPF Final    Epithelial Cells, UA 01/21/2021 11-20* 0 - 5 /HPF Final    Bacteria, UA 01/21/2021 1+* None Seen /HPF Final    Cholesterol, Total 01/21/2021 115  0 - 199 mg/dL Final    Triglycerides 01/21/2021 92  0 - 150 mg/dL Final    HDL 01/21/2021 49  40 - 60 mg/dL Final  LDL Calculated 01/21/2021 48  <100 mg/dL Final    VLDL Cholesterol Calculated 01/21/2021 18  Not Established mg/dL Final    Hemoglobin A1C 01/21/2021 4.6  See comment % Final    Comment: Comment:  Diagnosis of Diabetes: > or = 6.5%  Increased risk of diabetes (Prediabetes): 5.7-6.4%  Glycemic Control: Nonpregnant Adults: <7.0%                    Pregnant: <6.0%        eAG 01/21/2021 85.3  mg/dL Final    TSH 01/21/2021 0.67  0.27 - 4.20 uIU/mL Final            Medications  Current Facility-Administered Medications: busPIRone (BUSPAR) tablet 5 mg, 5 mg, Oral, TID  LORazepam (ATIVAN) tablet 1 mg, 1 mg, Oral, Q6H PRN  [START ON 1/26/2021] lactobacillus (CULTURELLE) capsule 1 capsule, 1 capsule, Oral, Daily with breakfast  famotidine (PEPCID) tablet 20 mg, 20 mg, Oral, BID  traZODone (DESYREL) tablet 100 mg, 100 mg, Oral, Nightly  prazosin (MINIPRESS) capsule 1 mg, 1 mg, Oral, Nightly  promethazine (PHENERGAN) injection 25 mg, 25 mg, Intramuscular, Q6H PRN  acetaminophen (TYLENOL) tablet 650 mg, 650 mg, Oral, Q4H PRN  ibuprofen (ADVIL;MOTRIN) tablet 400 mg, 400 mg, Oral, Q6H PRN  magnesium hydroxide (MILK OF MAGNESIA) 400 MG/5ML suspension 30 mL, 30 mL, Oral, Daily PRN  hydrOXYzine (VISTARIL) capsule 50 mg, 50 mg, Oral, TID PRN  nicotine (NICODERM CQ) 21 MG/24HR 1 patch, 1 patch, Transdermal, Daily  nicotine polacrilex (COMMIT) lozenge 2 mg, 2 mg, Oral, Q2H PRN  acetaminophen (TYLENOL) tablet 1,000 mg, 1,000 mg, Oral, Once    ASSESSMENT AND PLAN    Principal Problem:    Severe episode of recurrent major depressive disorder, without psychotic features ([address]  Active Problems:    Cannabis abuse    Borderline personality disorder in adult [facility])    Suicidal ideation  Resolved Problems:    * No resolved hospital problems. *       1. Patient s symptoms   are improving  2. Probable discharge is 2-3 days   3. Discharge planning is incomplete  4. Suicidal ideation is none  5.  Total time with patient was 40 minutes and more than 50 % of that time was spent counseling the patient on their symptoms, treatment and expected goals.

## 2021-01-25 NOTE — PLAN OF CARE
Problem: Pain:  Goal: Pain level will decrease  Description: Pain level will decrease  Outcome: Met This Shift  Note: Reports prn medication has been helpful in decreasing abd cramping     Problem: Suicide risk  Goal: Provide patient with safe environment  Description: Provide patient with safe environment  Outcome: Met This Shift     Problem: Altered Mood, Manic Behavior:  Goal: Able to verbalize decrease in frequency and intensity of racing thoughts  Description: Able to verbalize decrease in frequency and intensity of racing thoughts  Outcome: Ongoing  Note: Continues to approach the team station \"I know I'm being a pest\". Seeks out assurance that she is \"OK\". Frequent questions of what the mediations are she is taking and their ingredients are. Able to sit in groups and participate.

## 2021-01-25 NOTE — PROGRESS NOTES
Inpatient Occupational Therapy  Evaluation and Treatment    Unit:  [facility]  Date:  1/25/2021  Patient Name:    Peña Aremnta  Admitting diagnosis:  MDD (major depressive disorder), recurrent episode [facility]) [F33.9]  Admit Date:  1/21/2021  Precautions/Restrictions/WB Status/ Lines/ Wounds/ Oxygen:  Up as tolerated  Treatment Time:  13:42-14:46  Treatment Number:  1    Patient Goals for Therapy:  \" Be in a permeant state of acceptance. \"  \"Improve relationship with my son's father. \"      Discharge Recommendations:   []Home Independently  [x] Home with assist [] Home OT  []SNF  []ARU    DME needs for discharge:   NA     AM-PAC Score:    23     Home Health S4 Level: [x] NA       ACLS:   Completed on 1/25/2021    Mode 4.4   Engaging Abilities and Following Safety Precautions When the Person Can Complete a Goal     DESCRIPTION:     34% Cognitive Assistance: The person may live with someone who does a daily check on the environment, removing any safety hazards and solving problems when minor changes in the home occur. May be alone for part of the day with a procedure for obtaining help by phone or from a neighbor. May have a daily allowance and go to familiar places in the neighborhood. 34% minimum cognitive assistance is required to set up new activities and clean up after routine activities. 8% Physical Assistance is needed to assist with fine motor activities. Preadmission Environment:    Pt. Lives   [] alone  [x]with mother  Home environment:   [x] 2nd floor Apartment   []one story  []two story home. Steps to second floor  [x] Full Flight of 13  Bathroom: [x] Bath Tub Shower  []Walk in [employer]  [] [employer] owned: [] RW [] SW  []Rollator []W/C  []Shower Chair []BSC []Reacher  [employer] [] Other     Preadmission Status / PLOF:  History of falls   [x]Yes  []No  Hit head with car door and fell down secondary to being in a hurry. Pt. Able to drive   [x]Yes  []No    Pt Fully independent for ADLs/IADLs. [x]Yes  []No    Pt. Required assistance from family for:  []Bathing []Dressing []Cooking []Cleaning  []Laundry  []Other :   Pt. Fully independent for transfers and gait and walked with: [x]No Device  []Walker   []Cane  Sleep Hygiene:   4 hours sleep avg; fragmented; nightmares/terrors. 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 Financial Management:  Self; \"I try but I need help with budgeting. \"  Leisure Interests:  Reading, hiking, nature, drawing, make natural items  Medication Management:   \"I haven't been on medications. \"  Health Management:  Pt. Reports that she has a PCP, therapist however no Psychiatrist.  Social Network:  Ex-boyfriend, 3 close friends, cousin  Stressors:   1. Past trauma. 2.  Not being able to see son. 3.  Mother  Coping Skills:  Smoking weed, smoke, eat oatmeal, self harm    Pain  [x]Yes  []No  Ratin:10  Location:  stomach  Pain Medicine Status: [] Denies need  [] Pain med requested  [x] RN notified. Cognition    A&Ox4  Patient cooperative. Follows []1 step and [x]  3 step commands. Upper Extremity ROM:    WFL, pt able to perform all bed mobility, transfers, and gait without ROM limitation. Upper Extremity Strength:    WFL, pt able to perform all bed mobility, transfers, and gait without strength limitation. Upper Extremity Sensation:    No apparent deficits. Upper Extremity Proprioception:    No apparent deficits. Skin Integrity:  Pt. Chews on fingers.      Coordination and Tone:  WFL    Balance  Static Sitting:  [x] Good [] Fair [] Poor  Dynamic Sitting:  [x] Good [] Fair [] Poor  Static Standing: [x] Good [] Fair [] Poor  Dynamic Standing: [x] Good [] Fair [] Poor    Bed mobility:   independent  Supine to sit:  Sit to supine:  Scooting to head of bed:  Scooting in sitting:  Rolling:  Bridging:    Transfers:  Independent  Sit to stand:  Stand to sit:  Bed/Chair to/from toilet:    Self Care:  independent  Toileting:  Grooming:  Dressing:    Exercise / Activities Initiated:   Pt. Educated on role of OT. Pt. Participated in:  Eval, ACLS, ADL Retraining, medication management and coping skills. Assessment of Deficits:   Pt demonstrated decreased activity tolerance, decreased safety awareness, decreased cognition, and decreased ADL/IADL status. Pt. Limited during evaluation by decreased cognition. At end of evaluation, pt. In room. Goal(s) : To be met in 3 Visits:  1). Pt. To complete ACLS. 2). Pt. To verbalize understanding of sleep hygiene education. To be met in 5 Visits:  1). Pt. To complete 1 SMART long term goal and 2 SMART short term goals with mod assist.  2). Pt. To verbalize 3 new coping skills. 3). Pt. To complete interest check list.    4). Pt. To verbalize understanding of 3 communication styles. 5). Pt. To complete wellness plan. 6). Pt. To complete a daily schedule of healthy activities/routines with mod assist.   7). Pt. To identify 2 memory strategies to take medications as prescribed. Rehabilitation Potential:  Good for goals listed above. Strengths for achieving goals include:  PLOF  Barriers to achieving goals include:  Decreased Cognition     Plan: To be seen 2-5x/week while in acute care setting for therapeutic exercises/act, ADL retraining, NMR and patient/caregiver ed/instruction.      Timed Code Treatment Minutes:   54  minutes    Total Treatment Time:    64  minutes    Signature and License Number    SAMAN Burris/L  #[id number]        If patient discharges from this facility prior to next visit, this note will serve as the Discharge Summary

## 2021-01-26 PROCEDURE — 99233 SBSQ HOSP IP/OBS HIGH 50: CPT | Performed by: PSYCHIATRY & NEUROLOGY

## 2021-01-26 PROCEDURE — 97535 SELF CARE MNGMENT TRAINING: CPT

## 2021-01-26 PROCEDURE — 6370000000 HC RX 637 (ALT 250 FOR IP): Performed by: PSYCHIATRY & NEUROLOGY

## 2021-01-26 PROCEDURE — 6370000000 HC RX 637 (ALT 250 FOR IP): Performed by: PHYSICIAN ASSISTANT

## 2021-01-26 PROCEDURE — 1240000000 HC EMOTIONAL WELLNESS R&B

## 2021-01-26 RX ORDER — BACITRACIN, NEOMYCIN, POLYMYXIN B 400; 3.5; 5 [USP'U]/G; MG/G; [USP'U]/G
OINTMENT TOPICAL 2 TIMES DAILY
Status: DISCONTINUED | OUTPATIENT
Start: 2021-01-26 | End: 2021-01-29 | Stop reason: HOSPADM

## 2021-01-26 RX ADMIN — PRAZOSIN HYDROCHLORIDE 1 MG: 1 CAPSULE ORAL at 22:15

## 2021-01-26 RX ADMIN — POLYMYXIN B SULFATE, BACITRACIN ZINC, NEOMYCIN SULFATE: 5000; 3.5; 4 OINTMENT TOPICAL at 14:32

## 2021-01-26 RX ADMIN — BUSPIRONE HYDROCHLORIDE 5 MG: 5 TABLET ORAL at 08:27

## 2021-01-26 RX ADMIN — FAMOTIDINE 20 MG: 20 TABLET ORAL at 20:43

## 2021-01-26 RX ADMIN — HYDROXYZINE PAMOATE 50 MG: 50 CAPSULE ORAL at 23:35

## 2021-01-26 RX ADMIN — BUSPIRONE HYDROCHLORIDE 5 MG: 5 TABLET ORAL at 20:44

## 2021-01-26 RX ADMIN — Medication 1 CAPSULE: at 08:27

## 2021-01-26 RX ADMIN — FAMOTIDINE 20 MG: 20 TABLET ORAL at 08:27

## 2021-01-26 RX ADMIN — BUSPIRONE HYDROCHLORIDE 5 MG: 5 TABLET ORAL at 14:26

## 2021-01-26 RX ADMIN — POLYMYXIN B SULFATE, BACITRACIN ZINC, NEOMYCIN SULFATE: 5000; 3.5; 4 OINTMENT TOPICAL at 20:43

## 2021-01-26 RX ADMIN — TRAZODONE HYDROCHLORIDE 100 MG: 100 TABLET ORAL at 22:15

## 2021-01-26 NOTE — FLOWSHEET NOTE
01/26/21 1057   Status and Exam   Normal No   Facial Expression Exaggerated   Affect Unstable   Level of Consciousness Alert   Mood:Normal No   Mood Worthless, low self-esteem; Anxious; Helpless   Motor Activity:Normal Yes   Interview Behavior Cooperative; Impulsive   Preception [place] to Person;[place] to Time;[place] to Place;[place] to Situation   Attention:Normal No   Attention Distractible   Thought Processes Perseveration   Thought Content:Normal No   Thought Content Preoccupations   Hallucinations None   Delusions No   Memory:Normal Yes   Memory Poor Recent   Insight and Judgment No   Insight and Judgment Poor Judgment;Poor Insight   Present Suicidal Ideation No   Present Homicidal Ideation No

## 2021-01-26 NOTE — GROUP NOTE
Group Therapy Note    Date: 1/26/2021    Group Start Time: 1105  Group End Time: 6511  [facility] [address] [facility]        [facility] Note    Attendees: 6       Patient's Goal:  Patient will complete worksheet on boundaries and will discuss how they apply to mental wellbeing. Notes:  Patient attended group. Completed the worksheet and discussed in group. Verbalized a basic understanding of boundaries and gave examples of poor and healthy boundaries. Status After Intervention:  Improved    Participation Level:  Active Listener and Interactive    Participation Quality: Appropriate and Attentive    Speech:  normal    Thought Process/Content: Logical    Affective Functioning: Congruent    Mood: anxious, depressed     Level of consciousness:  Oriented x4    Response to Learning: Able to verbalize current knowledge/experience and Able to verbalize/acknowledge new learning    Endings: None Reported    Modes of Intervention: Education, Support, Socialization and Exploration    Discipline Responsible: /Counselor    Signature:  Malgorzata Alfonso [facility]

## 2021-01-26 NOTE — GROUP NOTE
Group Therapy Note    Date: 1/25/2021    Group Start Time: 2015  Group End Time: 2035  Group Topic: Wrap-Up    [facility]        Group Therapy Note    Attendees: goals and importance of goal setting discussed. Night time milieu activities discussed. Patient's Goal:  Get meds going    Notes:  Successful     Status After Intervention:  Improved    Participation Level:  Active Listener and Interactive    Participation Quality: Appropriate and Attentive      Speech:  normal      Thought Process/Content: Logical  Linear      Affective Functioning: Blunted      Mood: depressed      Level of consciousness:  Alert and Oriented x4      Response to Learning: Progressing to goal      Endings: None Reported    Modes of Intervention: Support      Discipline Responsible: Behavorial Health Tech      Signature:  Maximus Vasquez

## 2021-01-26 NOTE — PLAN OF CARE
Problem: Nutrition  Goal: Optimal nutrition therapy  Outcome: Ongoing  Note: Reported desire to meet with dietician \"I really need it\". Poor food intake. Fluids taken fair.

## 2021-01-26 NOTE — BH NOTE
Fermin Wooten went to bed at about [phone] [id number]. She took her trazodone and also requested a PRN Vistaril at that time. Fermin Wooten slept well.
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
## 2021-01-26 NOTE — GROUP NOTE
Group Therapy Note    Date: 1/26/2021    Group Start Time: 5793  Group End Time: 6231  Group Topic: Cognitive Skills    [facility]    Group Therapy Note    Attendees: 6    Patients learned about setting personal boundaries and how to start to identify their own personal boundaries and set health boundaries in their relationships. Patients were provided educational handouts that were reviewed during group discussion with peers. Discussed applying communication skills to expressing and asserting boundaries in their relationships. Notes:  Pt was talkative and engaged in group disucssion with peers. Pt shared wanting to work on her boundaries and that she has porous boundaries currently. Discussed setting boundaries in her relationship with her mother. Status After Intervention:  Improved    Participation Level:  Active Listener and Interactive    Participation Quality: Appropriate, Attentive and Sharing      Speech:  normal      Thought Process/Content: Logical  Linear      Affective Functioning: Constricted/Restricted      Mood: anxious and depressed      Level of consciousness:  Alert and Oriented x4      Response to Learning: Able to verbalize current knowledge/experience, Able to verbalize/acknowledge new learning and Able to retain information      Endings: None Reported    Modes of Intervention: Education, Support, Socialization, Exploration, Clarifying, Problem-solving and Activity      Discipline Responsible: /Counselor      Signature:  MARE Combs, ELISE

## 2021-01-26 NOTE — PLAN OF CARE
Problem: Pain:  Goal: Pain level will decrease  Outcome: Ongoing     Problem: Pain:  Goal: Control of acute pain  Outcome: Ongoing     Problem: Pain:  Goal: Control of chronic pain  Outcome: Ongoing     Problem: Suicide risk  Goal: Provide patient with safe environment  Outcome: Ongoing     Problem: Altered Mood, Depressive Behavior:  Goal: Ability to disclose and discuss suicidal ideas will improve  1/26/2021 1105 by Florencia Ritter RN  Outcome: Ongoing     Problem: Altered Mood, Depressive Behavior:  Goal: Absence of self-harm  Outcome: Ongoing     Problem: Altered Mood, Manic Behavior:  Goal: Able to sleep  Outcome: Ongoing     Problem: Altered Mood, Manic Behavior:  Goal: Able to verbalize decrease in frequency and intensity of racing thoughts  Outcome: Ongoing     Problem: Nutrition  Goal: Optimal nutrition therapy  Outcome: Ongoing     Problem: Nutrition  Goal: Understanding of nutritional guidelines  Outcome: Ongoing     Problem: Falls - Risk of:  Goal: Will remain free from falls  Outcome: Ongoing     Problem: Falls - Risk of:  Goal: Absence of physical injury  Outcome: Ongoing

## 2021-01-26 NOTE — PROGRESS NOTES
Inpatient Occupational Therapy  Treatment    Unit:  [facility]  Date:  1/26/2021  Patient Name:    Sae Hart  Admitting diagnosis:  MDD (major depressive disorder), recurrent episode [facility]) [F33.9]  Admit Date:  1/21/2021  Precautions/Restrictions/WB Status/ Lines/ Wounds/ Oxygen:  Up as tolerated  Treatment Time:  10:30-11:11  Treatment Number:  2    Patient Goals for Therapy:  \" Be in a permeant state of acceptance. \"  \"Improve relationship with my son's father. \"      Discharge Recommendations:   []Home Independently  [x] Home with assist [] Home OT  []SNF  []ARU    DME needs for discharge:   NA     AM-PAC Score:    23     Home Health S4 Level: [x] NA       ACLS:   Completed on 1/25/2021    Mode 4.4   Engaging Abilities and Following Safety Precautions When the Person Can Complete a Goal     DESCRIPTION:     34% Cognitive Assistance: The person may live with someone who does a daily check on the environment, removing any safety hazards and solving problems when minor changes in the home occur. May be alone for part of the day with a procedure for obtaining help by phone or from a neighbor. May have a daily allowance and go to familiar places in the neighborhood. 34% minimum cognitive assistance is required to set up new activities and clean up after routine activities. 8% Physical Assistance is needed to assist with fine motor activities. Pain  []Yes  [x]No  Rating:     Location:    Pain Medicine Status: [x] Denies need  [] Pain med requested  [] RN notified. Cognition    A&Ox4  Patient cooperative. Follows []1 step and [x]  3 step commands.     ADL Retraining:  Interest Checklist [facility] Adapted Version  Health and Fitness:  5 likes/ok; 1 want to try  Sports:  5 likes/ok; 3 want to try  Creative:  7 likes; 1 want to try  Productivity at home:  3 likes/ok; 2 want to try  Leisure at home:  8 likes/ok  Social:  4 likes/ok; 1 want to try  Outdoor Pursuits:  6 likes  Out and About:   7 likes/ok; 1 want to try  Educational:   4 likes    Assessment:   Pt participated in ADL Retraining, interest checklist [facility], leisure explorations, importance of healthy/habits routines. Pt. Participated well, was actively engaged and verbalized understanding of education. Pt. Making progress meeting one goal today. Pt. Limited during tx by decreased cognition. At end of evaluation, pt. In room. Goal(s) : To be met in 3 Visits:  1). Pt. To complete ACLS. (Completed on 1/25/2021)  2). Pt. To verbalize understanding of sleep hygiene education. To be met in 5 Visits:  1). Pt. To complete 1 SMART long term goal and 2 SMART short term goals with mod assist.  2). Pt. To verbalize 3 new coping skills. 3). Pt. To complete interest check list.  (Goal Met 01/26/2021)  4). Pt. To verbalize understanding of 3 communication styles. 5). Pt. To complete wellness plan. 6). Pt. To complete a daily schedule of healthy activities/routines with mod assist.   7). Pt. To identify 2 memory strategies to take medications as prescribed. Plan: To be seen 2-5x/week while in acute care setting for therapeutic exercises/act, ADL retraining, NMR and patient/caregiver ed/instruction.      Timed Code Treatment Minutes:   41  minutes    Total Treatment Time:    41  minutes    Signature and License Number    Vern Denise OTR/L  #[id number]        If patient discharges from this facility prior to next visit, this note will serve as the Discharge Summary

## 2021-01-26 NOTE — GROUP NOTE
Group Therapy Note   Date: 1/26/2021   Group Start Time: 1000   Group End Time: 8042   Group Topic: Psychoeducation   [address]     Group Therapy Note     Topic: Assertiveness Training     Group facilitator provided educational material on:   Assertive Communication Styles   Behaviors Associated with Assertive Communication   Avoidance Mechanisms for Assertive Communication   The Three Ways of Handling Criticism    During group processing, patients reflected on their personal behaviors associated with assertiveness, their avoidance mechanisms, and opportunities post-d/c for increased assertiveness in daily life. Attendees: 6     Patient's Goal: Patient will reflect in group processing, specify opportunities for increased assertiveness and personally experienced avoidance mechanism. Notes: Patient was present and actively engaged during group discussions, provided feedback and collaborated with peers. Patient met goal for group. Status After Intervention: Improved     Participation Level:  Active Listener and Interactive     Participation Quality: Appropriate, Sharing and Supportive     Speech: normal     Thought Process/Content: Logical     Affective Functioning: Congruent     Mood: Positive and relaxed     Level of consciousness: Alert and Oriented x4     Response to Learning: Able to verbalize/acknowledge new learning, Able to retain information, Capable of insight and Progressing to goal     Endings: None Reported     Modes of Intervention: Education, Socialization, Exploration and Problem-solving     Discipline Responsible: Psychoeducational Specialist     Signature: Mackenzie Ortiz MM, MT-BC

## 2021-01-27 PROBLEM — E44.0 MODERATE PROTEIN-CALORIE MALNUTRITION (HCC): Status: ACTIVE | Noted: 2021-01-27

## 2021-01-27 PROCEDURE — 1240000000 HC EMOTIONAL WELLNESS R&B

## 2021-01-27 PROCEDURE — 6370000000 HC RX 637 (ALT 250 FOR IP): Performed by: PHYSICIAN ASSISTANT

## 2021-01-27 PROCEDURE — 6370000000 HC RX 637 (ALT 250 FOR IP): Performed by: PSYCHIATRY & NEUROLOGY

## 2021-01-27 PROCEDURE — 99233 SBSQ HOSP IP/OBS HIGH 50: CPT | Performed by: PSYCHIATRY & NEUROLOGY

## 2021-01-27 RX ORDER — CLONIDINE HYDROCHLORIDE 0.1 MG/1
0.1 TABLET ORAL NIGHTLY
Status: DISCONTINUED | OUTPATIENT
Start: 2021-01-27 | End: 2021-01-28

## 2021-01-27 RX ADMIN — Medication 1 CAPSULE: at 08:55

## 2021-01-27 RX ADMIN — BUSPIRONE HYDROCHLORIDE 5 MG: 5 TABLET ORAL at 21:42

## 2021-01-27 RX ADMIN — CLONIDINE HYDROCHLORIDE 0.1 MG: 0.1 TABLET ORAL at 23:14

## 2021-01-27 RX ADMIN — POLYMYXIN B SULFATE, BACITRACIN ZINC, NEOMYCIN SULFATE: 5000; 3.5; 4 OINTMENT TOPICAL at 23:16

## 2021-01-27 RX ADMIN — FAMOTIDINE 20 MG: 20 TABLET ORAL at 21:42

## 2021-01-27 RX ADMIN — FAMOTIDINE 20 MG: 20 TABLET ORAL at 08:55

## 2021-01-27 RX ADMIN — BUSPIRONE HYDROCHLORIDE 5 MG: 5 TABLET ORAL at 08:55

## 2021-01-27 RX ADMIN — LORAZEPAM 1 MG: 1 TABLET ORAL at 01:34

## 2021-01-27 RX ADMIN — POLYMYXIN B SULFATE, BACITRACIN ZINC, NEOMYCIN SULFATE: 5000; 3.5; 4 OINTMENT TOPICAL at 09:19

## 2021-01-27 RX ADMIN — BUSPIRONE HYDROCHLORIDE 5 MG: 5 TABLET ORAL at 13:49

## 2021-01-27 RX ADMIN — LORAZEPAM 1 MG: 1 TABLET ORAL at 13:49

## 2021-01-27 ASSESSMENT — PAIN SCALES - GENERAL: PAINLEVEL_OUTOF10: 0

## 2021-01-27 NOTE — GROUP NOTE
Group Therapy Note    Date: 1/27/2021    Group Start Time: 1000  Group End Time: 8538  Group Topic: Psychoeducation    [address] [place] [address]        Group Therapy Note    Group Topic: Mindfulness Education    Facilitator provided patients with information on mindfulness as healthy distraction, processing and differentiating between sensory mindfulness and cognitive mindfulness. Group members practiced utilizing 1 cognitive and 1 sensory mindfulness experience selected by group members. Group members processed challenging balances of \"allowing emotions\" and \"combating emotions\", preferences for mindfulness. Attendees: 11       Notes:  Pt present and attentive to group session, providing feedback and encouragement to peers while processing. Continuing discussion of homeopathic medications and natural healing. Status After Intervention:  Improved    Participation Level:  Active Listener and Interactive    Participation Quality: Appropriate and Sharing      Speech:  normal      Thought Process/Content: Logical      Affective Functioning: Congruent      Mood: positive and relaxed      Level of consciousness:  Alert and Oriented x4      Response to Learning: Able to verbalize current knowledge/experience, Able to verbalize/acknowledge new learning, Capable of insight and Progressing to goal      Endings: None Reported    Modes of Intervention: Education, Support, Socialization, Exploration, Clarifying and Problem-solving      Discipline Responsible: Psychoeducational Specialist      Signature:  Hannah Jean MM, MT-BC

## 2021-01-27 NOTE — PLAN OF CARE
Problem: Altered Mood, Depressive Behavior:  Goal: Ability to disclose and discuss suicidal ideas will improve  Description: Ability to disclose and discuss suicidal ideas will improve  1/26/2021 2029 by Usama Aponte RN  Outcome: Ongoing  1/26/2021 1105 by Hermelinda Mercer RN  Outcome: Ongoing     Problem: Altered Mood, Depressive Behavior:  Goal: Absence of self-harm  Description: Absence of self-harm  1/26/2021 2029 by Usama Aponte RN  Outcome: Ongoing  1/26/2021 1105 by Hermelinda Mercer RN  Outcome: Ongoing     Pt is visible on floor and social. She denies all at this time. She talks about her mother quitting smoking and she is happy they can stop together now. Pt concerned related to new medication. Education provided. She states she is going against her beliefs with the medication. She reports her anxiety rated a 2 on 0-10 scale. She reports talking to her son and becomes emotional. She request essential oils to help calm her. Seems effective as she is social and watching TV.

## 2021-01-27 NOTE — PLAN OF CARE
Nutrition Problem #1: Inadequate oral intake  Intervention: Food and/or Nutrient Delivery: Continue Current Diet, Modify Oral Nutrition Supplement  Nutritional Goals: patient will consume as much po intake as she can tolerate without GI distress (N/V)

## 2021-01-27 NOTE — PLAN OF CARE
Pt alert, visible on unit, social with peers, complies with medication and care. Pt restless, tearful and isolative at times. Denies SI,HI,AVH, gait steady free from falls.

## 2021-01-27 NOTE — GROUP NOTE
Group Therapy Note    Date: 1/27/2021    Group Start Time: 1110  Group End Time: 0086  Group Topic: [address]        Group Therapy Note    Attendees: 10    Patient's Goal: to increase self-awareness, self-esteem, autonomy, emotional expression, and teamwork skills by completing a worksheet regarding control, participate in fawn analysis discussion, and contribute to songwriting activity. Notes:  [facility] actively engaged in group throughout. Pt shared songwriting activity with peers and received support and feedback. [facility] actively engaged in processing discussion and verbalized learning. Status After Intervention:  Improved    Participation Level:  Active Listener and Interactive    Participation Quality: Appropriate, Attentive, Sharing and Supportive      Speech:  normal      Thought Process/Content: Logical  Linear      Affective Functioning: Congruent      Mood: euthymic      Level of consciousness:  Alert, Oriented x4 and Attentive      Response to Learning: Able to verbalize current knowledge/experience, Able to verbalize/acknowledge new learning, Capable of insight and Progressing to goal      Endings: None Reported    Modes of Intervention: Education, Support, Socialization, Clarifying, Problem-solving, Activity and Media      Discipline Responsible: Psychoeducational Specialist      Signature:  CHAN Rodriguez

## 2021-01-27 NOTE — BH NOTE
Group Therapy Note    Date: 1-26/2021  Start Time: 2015   End Time:  2040  Number of Participants: 8    Type of Group: Wrap-Up    Patient's Goal:  \"to quit playing the victim\"    Notes:  Good day. Very happy and emotional, Breakthrough today. Did not describe what the breakthrough was. Status After Intervention:  Unchanged    Participation Level:  Active Listener    Participation Quality: Appropriate    Speech:  normal      Thought Process/Content: Logical      Affective Functioning: Congruent      Mood: euphoric      Level of consciousness:  Alert        Response to Learning: Able to verbalize current knowledge/experience      Endings: None Reported    Modes of Intervention: Education, Support and Problem-solving      Discipline Responsible: Registered Nurse      Signature:  Margareth Brennan RN

## 2021-01-27 NOTE — PROGRESS NOTES
Department of Psychiatry  AttendingProgress Note  Chief Complaint: depression  Conception Carol is not feeling well today . She is not sleeping well. Has taken numerous meds at HS to sleep. None are effective. No SI at this time and attending groups. Fearful to eat. Pulse has been 95,97,114 over the past 24 hours. Will add Clonidine 0.1 mg HS for insomnia. DC trazodone  Patient's chart was reviewed and collaborated with  about the treatment plan. SUBJECTIVE:    Patient is feeling better. Suicidal ideation:  denies suicidal ideation. Patient does not have medication side effects. ROS: Patient has new complaints: no  Sleeping adequately:  Yes   Appetite adequate: no  Attending groups: Yes  Visitors:No    OBJECTIVE    Physical  VITALS:  BP 84/62 Comment: manual  Pulse 114   Temp 98 °F (36.7 °C) (Temporal)   Resp 18   Ht 5' 5\" (1.651 m)   Wt 109 lb (49.4 kg)   LMP 01/01/2021   SpO2 98%   BMI 18.14 kg/m²     Mental Status Examination:  Patients appearance was street clothes. Thoughts are Goal directed. Homicidal ideations none. No abnormal movements, tics or mannerisms. Memory intact Aims 0. Concentration Fair. Alert and oriented X 4. Insight and Judgement impaired insight. Patient was cooperative.  Patient gait normal. Mood anxious, affect anxiety Hallucinations Absent, suicidal ideations no specific plan to harm self Speech normal volume  Data  Labs:   Admission on 01/21/2021   Component Date Value Ref Range Status    WBC 01/21/2021 6.4  4.0 - 11.0 K/uL Final    RBC 01/21/2021 3.82* 4.00 - 5.20 M/uL Final    Hemoglobin 01/21/2021 13.2  12.0 - 16.0 g/dL Final    Hematocrit 01/21/2021 38.4  36.0 - 48.0 % Final    MCV 01/21/2021 100.4* 80.0 - 100.0 fL Final    MCH 01/21/2021 34.6* 26.0 - 34.0 pg Final    MCHC 01/21/2021 34.4  31.0 - 36.0 g/dL Final    RDW 01/21/2021 12.3* 12.4 - 15.4 % Final    Platelets 75/13/9111 178  135 - 450 K/uL Final    MPV 01/21/2021 8.3  5.0 - 10.5 fL Final    Neutrophils % 01/21/2021 62.8  % Final    Lymphocytes % 01/21/2021 29.2  % Final    Monocytes % 01/21/2021 5.3  % Final    Eosinophils % 01/21/2021 1.8  % Final    Basophils % 01/21/2021 0.9  % Final    Neutrophils Absolute 01/21/2021 4.0  1.7 - 7.7 K/uL Final    Lymphocytes Absolute 01/21/2021 1.9  1.0 - 5.1 K/uL Final    Monocytes Absolute 01/21/2021 0.3  0.0 - 1.3 K/uL Final    Eosinophils Absolute 01/21/2021 0.1  0.0 - 0.6 K/uL Final    Basophils Absolute 01/21/2021 0.1  0.0 - 0.2 K/uL Final    Ethanol Lvl 01/21/2021 None Detected  mg/dL Final    Comment:    None Detected  Conversion factor:  100 mg/dl = .100 g/dl  For Medical Purposes Only      Color, UA 01/21/2021 Yellow  Straw/Yellow Final    Clarity, UA 01/21/2021 Clear  Clear Final    Glucose, Ur 01/21/2021 Negative  Negative mg/dL Final    Bilirubin Urine 01/21/2021 Negative  Negative Final    Ketones, Urine 01/21/2021 Negative  Negative mg/dL Final    Specific Gravity, UA 01/21/2021 1.020  1.005 - 1.030 Final    Blood, Urine 01/21/2021 MODERATE* Negative Final    pH, UA 01/21/2021 7.0  5.0 - 8.0 Final    Protein, UA 01/21/2021 Negative  Negative mg/dL Final    Urobilinogen, Urine 01/21/2021 0.2  <2.0 E.U./dL Final    Nitrite, Urine 01/21/2021 Negative  Negative Final    Leukocyte Esterase, Urine 01/21/2021 Negative  Negative Final    Microscopic Examination 01/21/2021 YES   Final    Urine Type 01/21/2021 NotGiven   Final    Urine received in a container without preservatives.     Sodium 01/21/2021 138  136 - 145 mmol/L Final    Potassium 01/21/2021 3.4* 3.5 - 5.1 mmol/L Final    Chloride 01/21/2021 107  99 - 110 mmol/L Final    CO2 01/21/2021 22  21 - 32 mmol/L Final    Anion Gap 01/21/2021 9  3 - 16 Final    Glucose 01/21/2021 112* 70 - 99 mg/dL Final    BUN 01/21/2021 12  7 - 20 mg/dL Final    CREATININE 01/21/2021 0.8  0.6 - 1.1 mg/dL Final    GFR Non- 01/21/2021 >60  >60 Final    Comment: >60 ng/mL Final    Oxycodone Urine 01/21/2021 Neg  Negative <100 ng/ml Final    pH, UA 01/21/2021 7.0   Final    Comment: Urine pH less than 5.0 or greater than 8.0 may indicate sample adulteration. Another sample should be collected if clinically  indicated.  Drug Screen Comment: 01/21/2021 see below   Final    Comment: This method is a screening test to detect only these drug  classes as part of a medical workup. Confirmatory testing  by another method should be ordered if clinically indicated.  SARS-CoV-2, NAAT 01/21/2021 Not Detected  Not Detected Final    Comment: Rapid NAAT:   Negative results should be treated as presumptive and,  if inconsistent with clinical signs and symptoms or necessary for  patient management, should be tested with an alternative molecular  assay. Negative results do not preclude SARS-CoV-2 infection and  should not be used as the sole basis for patient management decisions. This test has been authorized by the FDA under an Emergency Use  Authorization (EUA) for use by authorized laboratories.     Fact sheet for Healthcare Providers:  [employer]  Fact sheet for Patients: [employer]    METHODOLOGY: Isothermal Nucleic Acid Amplification      hCG Qual 01/21/2021 Negative  Detects HCG level >10 MIU/mL Final    Mucus, UA 01/21/2021 Rare* None Seen /LPF Final    WBC, UA 01/21/2021 3-5  0 - 5 /HPF Final    RBC, UA 01/21/2021 3-4  0 - 4 /HPF Final    Epithelial Cells, UA 01/21/2021 11-20* 0 - 5 /HPF Final    Bacteria, UA 01/21/2021 1+* None Seen /HPF Final    Cholesterol, Total 01/21/2021 115  0 - 199 mg/dL Final    Triglycerides 01/21/2021 92  0 - 150 mg/dL Final    HDL 01/21/2021 49  40 - 60 mg/dL Final    LDL Calculated 01/21/2021 48  <100 mg/dL Final    VLDL Cholesterol Calculated 01/21/2021 18  Not Established mg/dL Final    Hemoglobin A1C 01/21/2021 4.6  See comment % Final    Comment: Comment:  Diagnosis of Diabetes: > or = 6.5%  Increased risk of diabetes (Prediabetes): 5.7-6.4%  Glycemic Control: Nonpregnant Adults: <7.0%                    Pregnant: <6.0%        eAG 01/21/2021 85.3  mg/dL Final    TSH 01/21/2021 0.67  0.27 - 4.20 uIU/mL Final            Medications  Current Facility-Administered Medications: neomycin-bacitracin-polymyxin (NEOSPORIN) ointment, , Topical, BID  busPIRone (BUSPAR) tablet 5 mg, 5 mg, Oral, TID  LORazepam (ATIVAN) tablet 1 mg, 1 mg, Oral, Q6H PRN  lactobacillus (CULTURELLE) capsule 1 capsule, 1 capsule, Oral, Daily with breakfast  famotidine (PEPCID) tablet 20 mg, 20 mg, Oral, BID  traZODone (DESYREL) tablet 100 mg, 100 mg, Oral, Nightly  prazosin (MINIPRESS) capsule 1 mg, 1 mg, Oral, Nightly  promethazine (PHENERGAN) injection 25 mg, 25 mg, Intramuscular, Q6H PRN  acetaminophen (TYLENOL) tablet 650 mg, 650 mg, Oral, Q4H PRN  ibuprofen (ADVIL;MOTRIN) tablet 400 mg, 400 mg, Oral, Q6H PRN  magnesium hydroxide (MILK OF MAGNESIA) 400 MG/5ML suspension 30 mL, 30 mL, Oral, Daily PRN  hydrOXYzine (VISTARIL) capsule 50 mg, 50 mg, Oral, TID PRN  nicotine (NICODERM CQ) 21 MG/24HR 1 patch, 1 patch, Transdermal, Daily  nicotine polacrilex (COMMIT) lozenge 2 mg, 2 mg, Oral, Q2H PRN  acetaminophen (TYLENOL) tablet 1,000 mg, 1,000 mg, Oral, Once    ASSESSMENT AND PLAN    Principal Problem:    Severe episode of recurrent major depressive disorder, without psychotic features ([address]  Active Problems:    Cannabis abuse    Borderline personality disorder in adult [facility])    Suicidal ideation  Resolved Problems:    * No resolved hospital problems. *       1. Patient s symptoms   are improving  2. Probable discharge is 2-3 day s  3. Discharge planning is complete. IOP  4. Suicidal ideation is better  5. Total time with patient was 40 minutes and more than 50 % of that time was spent counseling the patient on their symptoms, treatment and expected goals.

## 2021-01-27 NOTE — PROGRESS NOTES
Behavioral Services             Medicare Re-Certification    I certify that the inpatient psychiatric hospital services furnished since the previous certification/re-certification were, and continue to be, medically necessary for;    X (1) Treatment which could reasonably be expected to improve the patient's condition,    X (2) Or for diagnostic study. Estimated length of stay/service 2-3 day s    Plan for post-hospital care IOP    This patient continues to need, on a daily basis, active treatment furnished directly by or requiring the supervision of inpatient psychiatric personnel.     Electronically signed by Agustin Presley MD on 1/27/2021 at 3:28 PM

## 2021-01-27 NOTE — GROUP NOTE
Group Therapy Note    Date: 1/26/2021    Group Start Time: 1600  Group End Time: 1700  Group Topic: Healthy Living/Wellness    [address]        Group Therapy Note    Attendees: 6           Patient's Goal:  Medication Education Group    Notes:  participated    Status After Intervention:  Improved    Participation Level:  Active Listener and Interactive    Participation Quality: Appropriate, Attentive and Sharing      Speech:  normal      Thought Process/Content: Logical  Linear      Affective Functioning: Congruent      Mood: euthymic      Level of consciousness:  Alert, Oriented x4 and Attentive      Response to Learning: Able to verbalize current knowledge/experience, Able to verbalize/acknowledge new learning, Able to retain information, Capable of insight, Able to change behavior and Progressing to goal      Endings: None Reported    Modes of Intervention: Education      Discipline Responsible: Registered Nurse      Signature:  Parviz Hernandes RN
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
## 2021-01-28 PROCEDURE — 6370000000 HC RX 637 (ALT 250 FOR IP): Performed by: PHYSICIAN ASSISTANT

## 2021-01-28 PROCEDURE — 6370000000 HC RX 637 (ALT 250 FOR IP): Performed by: PSYCHIATRY & NEUROLOGY

## 2021-01-28 PROCEDURE — 99233 SBSQ HOSP IP/OBS HIGH 50: CPT | Performed by: PSYCHIATRY & NEUROLOGY

## 2021-01-28 PROCEDURE — 1240000000 HC EMOTIONAL WELLNESS R&B

## 2021-01-28 RX ORDER — BUSPIRONE HYDROCHLORIDE 10 MG/1
10 TABLET ORAL 3 TIMES DAILY
Status: DISCONTINUED | OUTPATIENT
Start: 2021-01-28 | End: 2021-01-29 | Stop reason: HOSPADM

## 2021-01-28 RX ORDER — QUETIAPINE FUMARATE 25 MG/1
50 TABLET, FILM COATED ORAL NIGHTLY
Status: DISCONTINUED | OUTPATIENT
Start: 2021-01-28 | End: 2021-01-29 | Stop reason: HOSPADM

## 2021-01-28 RX ADMIN — BUSPIRONE HYDROCHLORIDE 5 MG: 5 TABLET ORAL at 08:16

## 2021-01-28 RX ADMIN — QUETIAPINE FUMARATE 50 MG: 25 TABLET ORAL at 22:35

## 2021-01-28 RX ADMIN — FAMOTIDINE 20 MG: 20 TABLET ORAL at 08:16

## 2021-01-28 RX ADMIN — FAMOTIDINE 20 MG: 20 TABLET ORAL at 22:34

## 2021-01-28 RX ADMIN — LORAZEPAM 1 MG: 1 TABLET ORAL at 09:27

## 2021-01-28 RX ADMIN — POLYMYXIN B SULFATE, BACITRACIN ZINC, NEOMYCIN SULFATE: 5000; 3.5; 4 OINTMENT TOPICAL at 22:36

## 2021-01-28 RX ADMIN — POLYMYXIN B SULFATE, BACITRACIN ZINC, NEOMYCIN SULFATE: 5000; 3.5; 4 OINTMENT TOPICAL at 08:16

## 2021-01-28 RX ADMIN — HYDROXYZINE PAMOATE 50 MG: 50 CAPSULE ORAL at 03:57

## 2021-01-28 RX ADMIN — LORAZEPAM 1 MG: 1 TABLET ORAL at 19:38

## 2021-01-28 RX ADMIN — IBUPROFEN 400 MG: 400 TABLET, FILM COATED ORAL at 09:28

## 2021-01-28 RX ADMIN — BUSPIRONE HYDROCHLORIDE 10 MG: 10 TABLET ORAL at 22:34

## 2021-01-28 RX ADMIN — BUSPIRONE HYDROCHLORIDE 10 MG: 10 TABLET ORAL at 13:11

## 2021-01-28 RX ADMIN — Medication 1 CAPSULE: at 08:16

## 2021-01-28 ASSESSMENT — PAIN SCALES - GENERAL: PAINLEVEL_OUTOF10: 3
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
## 2021-01-28 NOTE — GROUP NOTE
Group Therapy Note    Date: 1/28/2021    Group Start Time: 1000  Group End Time: 1100  [facility]        [facility] Note    Attendees: 8       Patient's Goal:  Patient will complete worksheet on acceptance. Will discuss how acceptance in life contributes to positive mental health. Notes:  Patient attended group. Completed the worksheet and discussed. Verbalized an understanding of the topic and gave examples of how acceptance applied to life. Status After Intervention:  Improved    Participation Level:  Active Listener and Interactive    Participation Quality: Appropriate, Attentive, Sharing and Supportive    Speech:  normal    Thought Process/Content: Logical Linear    Affective Functioning: Congruent    Mood: anxious and depressed    Level of consciousness:  Oriented x4    Response to Learning: Able to verbalize current knowledge/experience    Endings: None Reported    Modes of Intervention: Education, Support, Socialization and Exploration    Discipline Responsible: /Counselor    Signature:  Nick Dias, [facility]

## 2021-01-28 NOTE — PROGRESS NOTES
Range Status    WBC 01/21/2021 6.4  4.0 - 11.0 K/uL Final    RBC 01/21/2021 3.82* 4.00 - 5.20 M/uL Final    Hemoglobin 01/21/2021 13.2  12.0 - 16.0 g/dL Final    Hematocrit 01/21/2021 38.4  36.0 - 48.0 % Final    MCV 01/21/2021 100.4* 80.0 - 100.0 fL Final    MCH 01/21/2021 34.6* 26.0 - 34.0 pg Final    MCHC 01/21/2021 34.4  31.0 - 36.0 g/dL Final    RDW 01/21/2021 12.3* 12.4 - 15.4 % Final    Platelets 89/76/6338 178  135 - 450 K/uL Final    MPV 01/21/2021 8.3  5.0 - 10.5 fL Final    Neutrophils % 01/21/2021 62.8  % Final    Lymphocytes % 01/21/2021 29.2  % Final    Monocytes % 01/21/2021 5.3  % Final    Eosinophils % 01/21/2021 1.8  % Final    Basophils % 01/21/2021 0.9  % Final    Neutrophils Absolute 01/21/2021 4.0  1.7 - 7.7 K/uL Final    Lymphocytes Absolute 01/21/2021 1.9  1.0 - 5.1 K/uL Final    Monocytes Absolute 01/21/2021 0.3  0.0 - 1.3 K/uL Final    Eosinophils Absolute 01/21/2021 0.1  0.0 - 0.6 K/uL Final    Basophils Absolute 01/21/2021 0.1  0.0 - 0.2 K/uL Final    Ethanol Lvl 01/21/2021 None Detected  mg/dL Final    Comment:    None Detected  Conversion factor:  100 mg/dl = .100 g/dl  For Medical Purposes Only      Color, UA 01/21/2021 Yellow  Straw/Yellow Final    Clarity, UA 01/21/2021 Clear  Clear Final    Glucose, Ur 01/21/2021 Negative  Negative mg/dL Final    Bilirubin Urine 01/21/2021 Negative  Negative Final    Ketones, Urine 01/21/2021 Negative  Negative mg/dL Final    Specific Gravity, UA 01/21/2021 1.020  1.005 - 1.030 Final    Blood, Urine 01/21/2021 MODERATE* Negative Final    pH, UA 01/21/2021 7.0  5.0 - 8.0 Final    Protein, UA 01/21/2021 Negative  Negative mg/dL Final    Urobilinogen, Urine 01/21/2021 0.2  <2.0 E.U./dL Final    Nitrite, Urine 01/21/2021 Negative  Negative Final    Leukocyte Esterase, Urine 01/21/2021 Negative  Negative Final    Microscopic Examination 01/21/2021 YES   Final    Urine Type 01/21/2021 NotGiven   Final    Urine received in a container without preservatives.  Sodium 01/21/2021 138  136 - 145 mmol/L Final    Potassium 01/21/2021 3.4* 3.5 - 5.1 mmol/L Final    Chloride 01/21/2021 107  99 - 110 mmol/L Final    CO2 01/21/2021 22  21 - 32 mmol/L Final    Anion Gap 01/21/2021 9  3 - 16 Final    Glucose 01/21/2021 112* 70 - 99 mg/dL Final    BUN 01/21/2021 12  7 - 20 mg/dL Final    CREATININE 01/21/2021 0.8  0.6 - 1.1 mg/dL Final    GFR Non- 01/21/2021 >60  >60 Final    Comment: >60 mL/min/1.73m2 EGFR, calc. for ages 25 and older using the  MDRD formula (not corrected for weight), is valid for stable  renal function.  GFR  01/21/2021 >60  >60 Final    Comment: Chronic Kidney Disease: less than 60 ml/min/1.73 sq.m. Kidney Failure: less than 15 ml/min/1.73 sq.m. Results valid for patients 18 years and older.       Calcium 01/21/2021 9.4  8.3 - 10.6 mg/dL Final    Total Protein 01/21/2021 7.0  6.4 - 8.2 g/dL Final    Albumin 01/21/2021 4.5  3.4 - 5.0 g/dL Final    Albumin/Globulin Ratio 01/21/2021 1.8  1.1 - 2.2 Final    Total Bilirubin 01/21/2021 0.5  0.0 - 1.0 mg/dL Final    Alkaline Phosphatase 01/21/2021 37* 40 - 129 U/L Final    ALT 01/21/2021 12  10 - 40 U/L Final    AST 01/21/2021 14* 15 - 37 U/L Final    Globulin 01/21/2021 2.5  g/dL Final    Salicylate, Serum 46/66/9795 <0.3* 15.0 - 30.0 mg/dL Final    Comment: Therapeutic Range: 15.0-30.0 mg/dL  Toxic: >30.0 mg/dL      Acetaminophen Level 01/21/2021 <5* 10 - 30 ug/mL Final    Comment: Therapeutic Range: 10.0-30.0 ug/mL  Toxic: >=150 ug/mL      Amphetamine Screen, Urine 01/21/2021 Neg  Negative <1000ng/mL Final    Barbiturate Screen, Ur 01/21/2021 Neg  Negative <200 ng/mL Final    Benzodiazepine Screen, Urine 01/21/2021 Neg  Negative <200 ng/mL Final    Cannabinoid Scrn, Ur 01/21/2021 POSITIVE* Negative <50 ng/mL Final    Cocaine Metabolite Screen, Urine 01/21/2021 Neg  Negative <300 ng/mL Final    Opiate Scrn, /HPF Final    Epithelial Cells, UA 01/21/2021 11-20* 0 - 5 /HPF Final    Bacteria, UA 01/21/2021 1+* None Seen /HPF Final    Cholesterol, Total 01/21/2021 115  0 - 199 mg/dL Final    Triglycerides 01/21/2021 92  0 - 150 mg/dL Final    HDL 01/21/2021 49  40 - 60 mg/dL Final    LDL Calculated 01/21/2021 48  <100 mg/dL Final    VLDL Cholesterol Calculated 01/21/2021 18  Not Established mg/dL Final    Hemoglobin A1C 01/21/2021 4.6  See comment % Final    Comment: Comment:  Diagnosis of Diabetes: > or = 6.5%  Increased risk of diabetes (Prediabetes): 5.7-6.4%  Glycemic Control: Nonpregnant Adults: <7.0%                    Pregnant: <6.0%        eAG 01/21/2021 85.3  mg/dL Final    TSH 01/21/2021 0.67  0.27 - 4.20 uIU/mL Final            Medications  Current Facility-Administered Medications: QUEtiapine (SEROQUEL) tablet 50 mg, 50 mg, Oral, Nightly  busPIRone (BUSPAR) tablet 10 mg, 10 mg, Oral, TID  neomycin-bacitracin-polymyxin (NEOSPORIN) ointment, , Topical, BID  LORazepam (ATIVAN) tablet 1 mg, 1 mg, Oral, Q6H PRN  lactobacillus (CULTURELLE) capsule 1 capsule, 1 capsule, Oral, Daily with breakfast  famotidine (PEPCID) tablet 20 mg, 20 mg, Oral, BID  promethazine (PHENERGAN) injection 25 mg, 25 mg, Intramuscular, Q6H PRN  acetaminophen (TYLENOL) tablet 650 mg, 650 mg, Oral, Q4H PRN  ibuprofen (ADVIL;MOTRIN) tablet 400 mg, 400 mg, Oral, Q6H PRN  magnesium hydroxide (MILK OF MAGNESIA) 400 MG/5ML suspension 30 mL, 30 mL, Oral, Daily PRN  hydrOXYzine (VISTARIL) capsule 50 mg, 50 mg, Oral, TID PRN  nicotine polacrilex (COMMIT) lozenge 2 mg, 2 mg, Oral, Q2H PRN  acetaminophen (TYLENOL) tablet 1,000 mg, 1,000 mg, Oral, Once    ASSESSMENT AND PLAN    Principal Problem:    Severe episode of recurrent major depressive disorder, without psychotic features ([address]  Active Problems:    Cannabis abuse    Borderline personality disorder in adult [facility])    Suicidal ideation    Moderate protein-calorie malnutrition (HCC)  Resolved

## 2021-01-28 NOTE — BH NOTE
[facility]  Treatment Team Note  Day 7    Review Date & Time: 0900  1/28/2021    Patient was not in treatment team      Status EXAM:   Status and Exam  Normal: No  Facial Expression: Sad, Worried  Affect: Unstable  Level of Consciousness: Alert  Mood:Normal: No  Mood: Depressed, Helpless, Sad  Motor Activity:Normal: Yes  Motor Activity: Decreased  Interview Behavior: Cooperative  Preception: [place] to Person, Obadiah Perla to Time, [place] to Place, [place] to Situation  Attention:Normal: No  Attention: Unable to Concentrate  Thought Processes: Perseveration  Thought Content:Normal: No  Thought Content: Preoccupations  Hallucinations: None  Delusions: No  Memory:Normal: No  Memory: Poor Remote  Insight and Judgment: No  Insight and Judgment: Poor Judgment, Poor Insight  Present Suicidal Ideation: No  Present Homicidal Ideation: No      Suicide Risk CSSR-S:  1) Within the past month, have you wished you were dead or wished you could go to sleep and not wake up? : Yes  2) Have you actually had any thoughts of killing yourself? : Yes  3) Have you been thinking about how you might kill yourself? : No  5) Have you started to work out or worked out the details of how to kill yourself?  Do you intend to carry out this plan? : No  6) Have you ever done anything, started to do anything, or prepared to do anything to end your life?: No      PLAN/TREATMENT RECOMMENDATIONS UPDATE: Patient will take medication as prescribed, eat 75% of meals, attend groups, participate in milieu activities, participate in treatment team and care planning for discharge and follow up       Dinesh Lua RN

## 2021-01-28 NOTE — BH NOTE
Jana Aguilar was offered [employer] due to COVID-19 restrictions on visitors. Pt appropriately engaged. Pt's belongings were returned to the safe.     Angela Brannon MT-BC

## 2021-01-28 NOTE — PLAN OF CARE
Pt tearful anxious shows this nurse a scratch on her right chest, noted superficial red scratch which was not open. Upon speaking to this patient she states she spoke to her mother last night which gave her bad news and she was just unable to cope with this and would talk to  today about it. Pt contracts for safety at that time states she will talk with staff when she is having thoughts of self harm. Pt's jewelry taken logged and placed in the safe with her other belongings, prn ativan given per order. Pt setting in dayroom with peers quietly talking.

## 2021-01-29 VITALS
OXYGEN SATURATION: 99 % | RESPIRATION RATE: 20 BRPM | BODY MASS INDEX: 17.49 KG/M2 | TEMPERATURE: 97.8 F | HEART RATE: 105 BPM | WEIGHT: 105 LBS | SYSTOLIC BLOOD PRESSURE: 120 MMHG | DIASTOLIC BLOOD PRESSURE: 61 MMHG | HEIGHT: 65 IN

## 2021-01-29 LAB — HCG(URINE) PREGNANCY TEST: NEGATIVE

## 2021-01-29 PROCEDURE — 99239 HOSP IP/OBS DSCHRG MGMT >30: CPT | Performed by: PSYCHIATRY & NEUROLOGY

## 2021-01-29 PROCEDURE — 84703 CHORIONIC GONADOTROPIN ASSAY: CPT

## 2021-01-29 PROCEDURE — 6370000000 HC RX 637 (ALT 250 FOR IP): Performed by: PSYCHIATRY & NEUROLOGY

## 2021-01-29 PROCEDURE — 6370000000 HC RX 637 (ALT 250 FOR IP): Performed by: PHYSICIAN ASSISTANT

## 2021-01-29 PROCEDURE — 5130000000 HC BRIDGE APPOINTMENT

## 2021-01-29 RX ORDER — BUSPIRONE HYDROCHLORIDE 10 MG/1
10 TABLET ORAL 3 TIMES DAILY
Qty: 90 TABLET | Refills: 0 | Status: SHIPPED | OUTPATIENT
Start: 2021-01-29 | End: 2021-03-01 | Stop reason: ALTCHOICE

## 2021-01-29 RX ORDER — QUETIAPINE FUMARATE 50 MG/1
50 TABLET, FILM COATED ORAL NIGHTLY
Qty: 30 TABLET | Refills: 0 | Status: SHIPPED | OUTPATIENT
Start: 2021-01-29 | End: 2021-03-01 | Stop reason: SINTOL

## 2021-01-29 RX ORDER — LORAZEPAM 1 MG/1
1 TABLET ORAL EVERY 6 HOURS PRN
Qty: 10 TABLET | Refills: 0 | Status: SHIPPED | OUTPATIENT
Start: 2021-01-29 | End: 2021-02-28

## 2021-01-29 RX ORDER — LACTOBACILLUS RHAMNOSUS GG 10B CELL
1 CAPSULE ORAL
Qty: 30 CAPSULE | Refills: 0 | Status: SHIPPED | OUTPATIENT
Start: 2021-01-30 | End: 2022-08-30 | Stop reason: ALTCHOICE

## 2021-01-29 RX ORDER — FAMOTIDINE 20 MG/1
20 TABLET, FILM COATED ORAL 2 TIMES DAILY
Qty: 60 TABLET | Refills: 0 | Status: SHIPPED | OUTPATIENT
Start: 2021-01-29 | End: 2022-08-30 | Stop reason: ALTCHOICE

## 2021-01-29 RX ADMIN — Medication 1 CAPSULE: at 08:39

## 2021-01-29 RX ADMIN — FAMOTIDINE 20 MG: 20 TABLET ORAL at 08:40

## 2021-01-29 RX ADMIN — POLYMYXIN B SULFATE, BACITRACIN ZINC, NEOMYCIN SULFATE: 5000; 3.5; 4 OINTMENT TOPICAL at 08:40

## 2021-01-29 RX ADMIN — BUSPIRONE HYDROCHLORIDE 10 MG: 10 TABLET ORAL at 08:40

## 2021-01-29 NOTE — BH NOTE
[facility]  Discharge Note    Pt discharged with followings belongings:   Dentures: Lowers  Vision - Corrective Lenses: Glasses  Hearing Aid: None  Jewelry: Bracelet, Watch, Ring  Body Piercings Removed: N/A  Clothing: Jacket / coat, Pants, Shirt, Socks, Undergarments (Comment)  Were All Patient Medications Collected?: Yes  Other Valuables: Cell phone, Money (Comment), Annice , Other (Comment)(cell phone to safe, $4 cash in wallet, backpack, books, cigarettes and lighter, ecig)   Valuables sent home with patient. Valuables retrieved from and returned to patient. Patient education on aftercare instructions: yes. Information faxed to [facility] by this writer. Patient verbalize understanding of AVS:  yes. Status EXAM upon discharge:  Status and Exam  Normal: No  Facial Expression: Worried  Affect: Stable  Level of Consciousness: Alert  Mood:Normal: No  Mood: Anxious  Motor Activity:Normal: Yes  Motor Activity: Decreased  Interview Behavior: Cooperative  Preception: [place] to Person, Thurmon Haus to Time, [place] to Place, [place] to Situation  Attention:Normal: No  Attention: Distractible  Thought Processes: Flt.of Ideas  Thought Content:Normal: No  Thought Content: Preoccupations  Hallucinations: None  Delusions: No  Memory:Normal: No  Memory: Poor Recent, Poor Remote  Insight and Judgment: No  Insight and Judgment: Poor Judgment, Poor Insight  Present Suicidal Ideation: No  Present Homicidal Ideation: No      Metabolic Screening:    Lab Results   Component Value Date    LABA1C 4.6 01/21/2021       Lab Results   Component Value Date    CHOL 115 01/21/2021     Lab Results   Component Value Date    TRIG 92 01/21/2021     Lab Results   Component Value Date    HDL 49 01/21/2021     No components found for: [facility]  Lab Results   Component Value Date    LABVLDL 18 01/21/2021       Ashley Caba RN    Bridge Appointment completed: Reviewed Discharge Instructions with patient.     Patient verbalizes understanding and agreement with the discharge plan using the teachback method.      Referral for Outpatient Tobacco Cessation Counseling, upon discharge (troy X if applicable and completed):    ( )  Hospital staff assisted patient to call Quit Line or faxed referral                                   during hospitalization                  (X)  Recognizing danger situations (included triggers and roadblocks), if not completed on admission                    (X)  Coping skills (new ways to manage stress, exercise, relaxation techniques, changing routine, distraction), if not completed on admission    (X)  Basic information about quitting (benefits of quitting, techniques in how to quit, available resources, if not completed on admission  ( ) Referral for counseling faxed to [facility]   ( ) Patient refused referral  ( ) Patient refused counseling  ( ) Patient refused smoking cessation medication upon discharge    Vaccinations (troy X if applicable and completed):  ( ) Patient states already received influenza vaccine elsewhere  ( ) Patient received influenza vaccine during this hospitalization  (X) Patient refused influenza vaccine at this time

## 2021-01-29 NOTE — SUICIDE SAFETY PLAN
SAFETY PLAN    A suicide Safety Plan is a document that supports someone when they are having thoughts of suicide. Warning Signs that indicate a suicidal crisis may be developing: What (situations, thoughts, feelings, body sensations, behaviors, etc.) do you experience that lets you know you are beginning to think about suicide? 1. Intrusive thoughts, holding breathe  2. Rapid heart beat, angry mood  3. Victim behavior    Internal Coping Strategies:  What things can I do (relaxation techniques, hobbies, physical activities, etc.) to take my mind off my problems without contacting another person? 1. Positive affirmations  2. Practice mindfulness  3. Look to the future    People and social settings that provide distraction: Who can I call or where can I go to distract me? 1. Place: Park            2. Place: Car, My happy Araseli Marty [address] whom I can ask for help: Who can I call when I need help - for example, friends, family, clergy, someone else? 1. Name: Zarajeremytta Crimes               2. Name: Mo   3. Name: Fatoumata Aranda or [address] [place] [address] I can contact during a crisis: Who can I call for help - for example, my doctor, my psychiatrist, my psychologist, a mental health provider, a suicide hotline? 1. Clinician Name: [address] [place] [address]   Address: [address] [place], [facility]      Phone  #: [phone]    2. Suicide Prevention Lifeline: 4-648-731-TALK (4037)    Making the environment safe: How can I make my environment (house/apartment/living space) safer? For example, can I remove guns, medications, and other items? 1.  Handle triggers appropriately     Something that is important to me and worth living for is: my son, my life

## 2021-01-29 NOTE — GROUP NOTE
Group Therapy Note    Date: 1/28/2021    Group Start Time: 2030  Group End Time: 2100  Group Topic: Araseli Xie, RN        Group Therapy Note    Attendees: discussed goals for the day and rules of the unit. Patient's Goal:  Patient goal was to think positive    Notes:  Anxious but cooperative on unit. Status After Intervention:  Improved    Participation Level:  Active Listener and Interactive    Participation Quality: Appropriate, Attentive and Sharing      Speech:  normal      Thought Process/Content: Logical  Linear      Affective Functioning: Congruent      Mood: anxious      Level of consciousness:  Alert and Oriented x4      Response to Learning: Able to verbalize current knowledge/experience, Able to verbalize/acknowledge new learning and Able to retain information      Endings: None Reported    Modes of Intervention: Education, Support and Socialization      Discipline Responsible: Registered Nurse      Signature:  Kalyan Peralta RN

## 2021-01-29 NOTE — GROUP NOTE
Group Therapy Note    Date: 1/29/2021    Group Start Time: 1100  Group End Time: 1200  Group Topic: Psychotherapy    [facility], JACQUE, SPENCER, LSW      Group Therapy Note    Attendees: 9         Patient's Goal:  Explore/process relationship dynamics and affects of mental health s/s on relationships, as well as how relationships affect mental health s/s. Learn/discuss healthy relationship/communication skills. Notes:  Pt shared her experiences with her significant other. Pt shared the dynamics of the relationship and how she would like to do things differently. Discussed health communication skills to utilize within this relationship. Status After Intervention:  Improved    Participation Level:  Active Listener and Interactive    Participation Quality: Appropriate, Attentive, Sharing and Supportive      Speech:  normal      Thought Process/Content: Logical      Affective Functioning: Congruent      Mood: euthymic      Level of consciousness:  Alert, Oriented x4 and Attentive      Response to Learning: Able to verbalize current knowledge/experience, Able to verbalize/acknowledge new learning, Able to retain information, Capable of insight, Able to change behavior and Progressing to goal      Endings: None Reported    Modes of Intervention: Education, Support, Socialization, Exploration, Clarifying, Confrontation, Limit-setting and Reality-testing      Discipline Responsible: /Counselor      Signature:  JACQUE Rec[address]

## 2021-01-29 NOTE — PLAN OF CARE
Problem: Pain:  Goal: Pain level will decrease  Description: Pain level will decrease  Outcome: Ongoing     Problem: Suicide risk  Goal: Provide patient with safe environment  Description: Provide patient with safe environment  Outcome: Ongoing     Problem: Altered Mood, Depressive Behavior:  Goal: Ability to disclose and discuss suicidal ideas will improve  Description: Ability to disclose and discuss suicidal ideas will improve  Outcome: Ongoing     Problem: Altered Mood, Manic Behavior:  Goal: Able to sleep  Description: Able to sleep  Outcome: Ongoing     Problem: Falls - Risk of:  Goal: Will remain free from falls  Description: Will remain free from falls  Outcome: Ongoing  Nursing shift report 1900 to present- Patient was visible on the unit in the evening out on the milieu. At he beginning of shift patient was tearful talking about her uncle which upset her on he phone. She was given ativan 1 mg oral at 1931 for anxiety which was effective. She was much calmer the rest of the evening, social with peers, attended group and talked on phone later staying calm. She did voice depression and anxiety symptoms but denied SI, HI and AVH. She denied any thoughts of self harm. She was medication compliant and took fist dose of Seroquel which has been effective so far for sleep. She ate very little for snack then took a snack to her room stating that she would eat it later. Patient drinking fluids well. No unsafe behaviors noted.
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
## 2021-02-02 ENCOUNTER — HOSPITAL ENCOUNTER (OUTPATIENT)
Dept: PSYCHIATRY | Age: 31
Setting detail: THERAPIES SERIES
Discharge: HOME | End: 2021-02-02
Payer: COMMERCIAL

## 2021-02-02 PROCEDURE — 90791 PSYCH DIAGNOSTIC EVALUATION: CPT | Performed by: COUNSELOR

## 2021-02-02 ASSESSMENT — SLEEP AND FATIGUE QUESTIONNAIRES
DO YOU USE A SLEEP AID: YES
DO YOU HAVE DIFFICULTY SLEEPING: YES
RESTFUL SLEEP: NO
DIFFICULTY FALLING ASLEEP: YES
SLEEP PATTERN: DIFFICULTY FALLING ASLEEP;DISTURBED/INTERRUPTED SLEEP;RESTLESSNESS

## 2021-02-02 ASSESSMENT — ANXIETY QUESTIONNAIRES
6. BECOMING EASILY ANNOYED OR IRRITABLE: 3-NEARLY EVERY DAY
2. NOT BEING ABLE TO STOP OR CONTROL WORRYING: 3-NEARLY EVERY DAY
GAD7 TOTAL SCORE: 21
4. TROUBLE RELAXING: 3-NEARLY EVERY DAY

## 2021-02-02 ASSESSMENT — LIFESTYLE VARIABLES: HISTORY_ALCOHOL_USE: YES

## 2021-02-02 ASSESSMENT — PATIENT HEALTH QUESTIONNAIRE - PHQ9: SUM OF ALL RESPONSES TO PHQ QUESTIONS 1-9: 19
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
## 2021-02-03 ENCOUNTER — HOSPITAL ENCOUNTER (OUTPATIENT)
Dept: PSYCHIATRY | Age: 31
Setting detail: THERAPIES SERIES
Discharge: HOME | End: 2021-02-03
Payer: COMMERCIAL

## 2021-02-03 VITALS — TEMPERATURE: 96.8 F

## 2021-02-03 DIAGNOSIS — F60.3 BORDERLINE PERSONALITY DISORDER IN ADULT (HCC): ICD-10-CM

## 2021-02-03 PROCEDURE — H2020 THER BEHAV SVC, PER DIEM: HCPCS

## 2021-02-03 NOTE — GROUP NOTE
Group Therapy Note    Date: 2/3/2021    Group Start Time:  3:15 PM  Group End Time:  4:15 PM  Group Topic: Music Therapy    [facility]    Delon Bryankian        Group Therapy Note    Mode of Intervention: Marge Analysis & Creative Art    Music Therapy group consisted of marge analysis intervention, utilizing song \"Dream\" by Latha Patino. Therapist facilitated discussion of dreams and how they change through different phases of life. Group members discussed different aspects of dreams (goals, fantasies, daydreams, etc.), relating verses of songs to phases of life (dreams of innocence, dreams of identity, dreams of reminiscence). Pts then engaged in art activity, expressing dreams from childhood, a goal for current phase of life, and a dream that they hope to reminisce about later in life. Attendees: 6         Notes:  Pt present and actively engaged during session discussing hx during childhood exposed to trauma. During group processing, patient discussing family as contributor to personal struggle, citing treatment at acute care unit in [facility] as \"saving whitley\", but returning to negative mindset and behaviors upon D/C. Stating \"It took a miracle to get me here today. I didn't want to be here. I'm comfortable in the pain. \" Received consistent feedback from peers, encouragement for attendance and expressed intent to return. Status After Intervention:  Improved    Participation Level:  Active Listener and Interactive    Participation Quality: Appropriate, Sharing and Supportive      Speech:  normal      Thought Process/Content: Logical      Affective Functioning: Congruent      Mood: depressed      Level of consciousness:  Alert and Oriented x4      Response to Learning: Able to verbalize current knowledge/experience, Able to verbalize/acknowledge new learning, Capable of insight and Progressing to goal      Endings: None Reported    Modes of Intervention: Support, Socialization, Clarifying, Activity and Media      Discipline Responsible: Psychoeducational Specialist      Signature:  Velvet Taylor MM, MT-BC

## 2021-02-03 NOTE — GROUP NOTE
Group Therapy Note    Date: 2/3/2021    Group Start Time:  2:00 PM  Group End Time:  3:00 PM  Group Topic: Art Therapy     [facility]    Sophia Greenwood, ATR        Group Therapy Note    Attendees: 6         Patient's Goal:  Patients were invited to participate in an Art Therapy group creating vision boards. Patients received a handout identifying personal values and were asked to collage their goals and dreams, exploring what inspired them or motivated them based on their values. Patients were asked to discuss how they live out their values, how values are impacted by their mental health, and how values shape our goals. At the end of group, patients were encouraged to share and process their vision board with the group. Notes:  Haylie Amezquita patient engaged in identifying personal values, participated in creating a vision board, and requested to continue to work on the project at a later group session, needing more time. Status After Intervention:  Improved    Participation Level:  Active Listener and Interactive    Participation Quality: Appropriate, Attentive and Sharing      Speech:  normal      Thought Process/Content: Logical      Affective Functioning: Blunted      Mood: anxious and depressed      Level of consciousness:  Alert, Oriented x4 and Attentive      Response to Learning: Able to verbalize current knowledge/experience, Able to verbalize/acknowledge new learning, Able to retain information and Capable of insight      Endings: None Reported    Modes of Intervention: Education, Support, Socialization, Exploration, Clarifying, Problem-solving, Activity and Media      Discipline Responsible: Psychoeducational Specialist      Signature:  Willam Brunson

## 2021-02-03 NOTE — GROUP NOTE
Group Therapy Note    Date: 2/3/2021    Group Start Time: 12:30 PM  Group End Time:  1:45 PM  Group Topic: Psychotherapy    [facility] ADDISON Weaver Cera, [facility]    Group Therapy Note    Attendees: 6    Patient shared and set a goal at the beginning of group to practice a new way of being in group today. Notes:  Pt set goal to [facility] on acceptance\" and was tearful with group as she shared feeling stressed trying to care for her mother and to be there for her son at the same time. Pt appeared to open up and share more with the group and received supportive feedback from group. Status After Intervention:  Improved    Participation Level:  Active Listener and Interactive    Participation Quality: Appropriate, Attentive and Sharing      Speech:  normal      Thought Process/Content: Perseverating      Affective Functioning: Congruent      Mood: anxious and depressed      Level of consciousness:  Alert and Oriented x4      Response to Learning: Able to verbalize current knowledge/experience, Able to verbalize/acknowledge new learning and Able to retain information      Endings: None Reported    Modes of Intervention: Education, Support, Socialization, Exploration, Clarifying and Problem-solving      Discipline Responsible: /Counselor      Signature:  MARIANNA Weaver Cera-S, R-DIANAT

## 2021-02-05 ENCOUNTER — HOSPITAL ENCOUNTER (OUTPATIENT)
Dept: PSYCHIATRY | Age: 31
Setting detail: THERAPIES SERIES
Discharge: HOME | End: 2021-02-05
Payer: COMMERCIAL

## 2021-02-05 VITALS — TEMPERATURE: 98 F

## 2021-02-05 VITALS — HEART RATE: 79 BPM | SYSTOLIC BLOOD PRESSURE: 110 MMHG | DIASTOLIC BLOOD PRESSURE: 69 MMHG

## 2021-02-05 DIAGNOSIS — F60.3 BORDERLINE PERSONALITY DISORDER IN ADULT (HCC): ICD-10-CM

## 2021-02-05 PROCEDURE — 90792 PSYCH DIAG EVAL W/MED SRVCS: CPT | Performed by: PSYCHIATRY & NEUROLOGY

## 2021-02-05 PROCEDURE — H2020 THER BEHAV SVC, PER DIEM: HCPCS

## 2021-02-05 NOTE — GROUP NOTE
Group Therapy Note    Date: 2/5/2021    Group Start Time:  2:00 PM  Group End Time:  3:00 PM  Group Topic: Art Therapy     [facility] OP ADDISON Greenwood, ATR        Group Therapy Note    Attendees: 6         Patient's Goal:  Patients were invited to participate in an Art Therapy group on grounding for anxiety and to create vision boards, which they had started in the previous art therapy group on 2/3/21. At the beginning of group, patients reported feeling a high level of anxiety, so therapist led a grounding visualization exercise for relaxation. Next, patients were asked to continue creating a vision board for their goals and dreams, guided by their personal values. Patients were asked to discuss how they live out their values, how values are impacted by their mental health, and how values shape our goals. Notes:  Melanie Rivera appeared to engage in the guided visualization, actively participated in creating a vision board, requested more time to work on the vision board in the next session, and reported a decrease in her anxiety while engaging in art making. Status After Intervention:  Unchanged    Participation Level:  Active Listener and Interactive    Participation Quality: Appropriate, Attentive, Sharing and Supportive      Speech:  normal      Thought Process/Content: Logical      Affective Functioning: Blunted      Mood: anxious and depressed      Level of consciousness:  Alert, Oriented x4 and Attentive      Response to Learning: Able to verbalize current knowledge/experience, Able to verbalize/acknowledge new learning, Able to retain information and Capable of insight      Endings: None Reported    Modes of Intervention: Education, Support, Socialization, Exploration, Clarifying, Problem-solving, Activity and Media      Discipline Responsible: Psychoeducational Specialist      Signature:  Willam Angel

## 2021-02-05 NOTE — GROUP NOTE
Group Therapy Note    Date: 2/5/2021    Group Start Time: 12:30 PM  Group End Time:  1:50 PM  Group Topic: Psychotherapy    BASIM Stovall, [facility]    Group Therapy Note    Attendees: 5    Patient shared and set a goal at the beginning of group to practice a new way of being in group today. Notes:  Pt set goal to  [employer] on gratitude\". Pt was engaged in group and shared how she is working on setting boundaries with her mother and that she has been able to spend more time with her son this week. Pt reported groups on Wednesday were helpful and that the feedback from peers on Wednesday helped her. Pt seemed to relate and connect with peers during group. Status After Intervention:  Improved    Participation Level:  Active Listener and Interactive    Participation Quality: Appropriate, Attentive and Sharing      Speech:  normal      Thought Process/Content: Logical  Linear      Affective Functioning: Flat and Constricted/Restricted      Mood: anxious and depressed      Level of consciousness:  Alert and Oriented x4      Response to Learning: Able to verbalize current knowledge/experience, Able to verbalize/acknowledge new learning and Able to retain information      Endings: None Reported    Modes of Intervention: Education, Support, Socialization, Exploration, Clarifying and Problem-solving      Discipline Responsible: /Counselor      Signature:  MARE Hoyos, ELISE
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
## 2021-02-08 ENCOUNTER — HOSPITAL ENCOUNTER (OUTPATIENT)
Dept: PSYCHIATRY | Age: 31
Setting detail: THERAPIES SERIES
Discharge: HOME | End: 2021-02-08
Payer: COMMERCIAL

## 2021-02-08 VITALS — TEMPERATURE: 97.1 F

## 2021-02-08 DIAGNOSIS — F60.3 BORDERLINE PERSONALITY DISORDER IN ADULT (HCC): ICD-10-CM

## 2021-02-08 PROCEDURE — H2020 THER BEHAV SVC, PER DIEM: HCPCS

## 2021-02-08 NOTE — GROUP NOTE
Group Therapy Note    Date: 2/8/2021    Group Start Time:  3:15 PM  Group End Time:  4:15 PM  Group Topic: Psychoeducation    BASIM Rogers        Group Therapy Note    Topic: Forgiveness and Self-Forgiveness    Group members educated on definitions of forgiveness, the four phases of forgiveness (Uncovering Phase, Decision Phase, Work Phase, Deepening Phase). Processed topics and related to personal identities. Attendees: 6         Patient's Goal:  Patient will engage participate in group education, discussion of principles of forgiveness as it relates to personal situation. Notes: Actively engaged in discussions, utilized educational resources and expressed intent to focus on exploration in phase 1 and phase 2 of forgiveness before Wed group therapy sessions. Status After Intervention:  Improved    Participation Level:  Active Listener and Interactive    Participation Quality: Appropriate, Sharing and Supportive      Speech:  normal      Thought Process/Content: Logical      Affective Functioning: Congruent      Mood: euthymic      Level of consciousness:  Alert and Oriented x4      Response to Learning: Able to verbalize current knowledge/experience, Able to verbalize/acknowledge new learning, Capable of insight and Progressing to goal      Endings: None Reported    Modes of Intervention: Education, Support, Socialization, Exploration, Clarifying and Problem-solving      Discipline Responsible: Psychoeducational Specialist      Signature:  Renata De Oliveira MM, MT-BC

## 2021-02-08 NOTE — GROUP NOTE
Group Therapy Note    Date: 2/8/2021    Group Start Time:  2:00 PM  Group End Time:  3:00 PM  Group Topic: Psychoeducation    [facility]    Sophia Greenwood, TARUN        Group Therapy Note    Attendees: 6         Patient's Goal:  Patients were invited to listen to information presented from the [employer] Talk by Ric Reynaga, entitled, The Stories We Tell Ourselves, exploring the role self-talk, the amygdala, the frontal lobe, and the reticular activating system play in shaping our thinking and behavior. As a group, patients were invited to identify the stories they tell themselves and to challenge any negative thinking that was limiting their progress and recovery. Notes:  George Morley, who entered group session 15 minutes late after getting Rama's, appeared attentive to the group discussion, asked clarifying questions and engaged in group discussion, and worked on challenging negative thinking. Status After Intervention:  Improved    Participation Level:  Active Listener and Interactive    Participation Quality: Appropriate, Attentive and Sharing      Speech:  normal      Thought Process/Content: Linear      Affective Functioning: Constricted/Restricted      Mood: anxious and depressed      Level of consciousness:  Alert, Oriented x4 and Attentive      Response to Learning: Able to verbalize current knowledge/experience, Able to verbalize/acknowledge new learning, Able to retain information and Capable of insight      Endings: None Reported    Modes of Intervention: Education, Support, Socialization, Exploration, Clarifying, Problem-solving, Activity and Media      Discipline Responsible: Psychoeducational Specialist      Signature:  Sophia 251Willam Reinoso

## 2021-02-08 NOTE — GROUP NOTE
Group Therapy Note    Date: 2/8/2021    Group Start Time: 12:30 PM  Group End Time:  1:45 PM  Group Topic: Psychotherapy    BASIM Parekh, [facility]    Group Therapy Note    Attendees: 5    Patient shared and set a goal at the beginning of group to practice a new way of being in group today. Notes:  Pt set goal to work on [email or web address] worth and stability\". Pt was talkative during group and offered peers supportive feedback during group. Status After Intervention:  Improved    Participation Level:  Active Listener and Interactive    Participation Quality: Appropriate, Attentive, Sharing and Supportive      Speech:  normal      Thought Process/Content: Linear      Affective Functioning: Constricted/Restricted      Mood: anxious      Level of consciousness:  Alert and Oriented x4      Response to Learning: Able to verbalize current knowledge/experience, Able to verbalize/acknowledge new learning and Able to retain information      Endings: None Reported    Modes of Intervention: Education, Support, Socialization, Exploration, Clarifying and Problem-solving      Discipline Responsible: /Counselor      Signature:  MARE Becerra, ELISE

## 2021-02-12 ENCOUNTER — HOSPITAL ENCOUNTER (OUTPATIENT)
Dept: PSYCHIATRY | Age: 31
Setting detail: THERAPIES SERIES
Discharge: HOME | End: 2021-02-12
Payer: COMMERCIAL

## 2021-02-12 VITALS — TEMPERATURE: 98 F

## 2021-02-12 DIAGNOSIS — F60.3 BORDERLINE PERSONALITY DISORDER IN ADULT (HCC): ICD-10-CM

## 2021-02-12 PROCEDURE — 90853 GROUP PSYCHOTHERAPY: CPT | Performed by: COUNSELOR

## 2021-02-12 PROCEDURE — 90853 GROUP PSYCHOTHERAPY: CPT

## 2021-02-12 NOTE — GROUP NOTE
Group Therapy Note    Date: 2/12/2021    Group Start Time:  3:15 PM  Group End Time:  4:15 PM  Group Topic: Music Therapy    [facility] BHMIGUEL Rogers        Group Therapy Note    Group Intervention: Personal Album Cover    Group members utilized group session to create an album cover representing aspects of their identity. Group members encouraged to explore names for self, song lyrics using words representing their present emotional states, list support system, and express \"Thank You & Acknowledgements\". Attendees: 7         Notes:  Pt present and actively engaged across session, utilizing intervention to process positive support from family members, discussing positive relationship with mother multiple times. Receptive to feedback from peers, brightened affect at close of session. Status After Intervention:  Improved    Participation Level:  Active Listener and Interactive    Participation Quality: Sharing and Supportive      Speech:  normal      Thought Process/Content: Logical      Affective Functioning: Congruent      Mood: euthymic      Level of consciousness:  Alert and Oriented x4      Response to Learning: Capable of insight and Progressing to goal      Endings: None Reported    Modes of Intervention: Support, Exploration, Activity and Media      Discipline Responsible: Psychoeducational Specialist      Signature:  Dario Dorsey MM, MT-BC

## 2021-02-12 NOTE — GROUP NOTE
Group Therapy Note    Date: 2/12/2021    Group Start Time:  2:00 PM  Group End Time:  3:00 PM  Group Topic: Recovery    [facility]    Tiffanie Ahmadi, [facility]        Group Therapy Note    Attendees: 7         Patient's Goal:  Patient will complete worksheet on Dependency and will discuss how over-reliance on others' approval effects mental health. Notes:  Patient attended group. Completed the worksheet and discussed in group. Patient verbalized an understanding of the concepts and gave examples from her relationships. Status After Intervention:  Improved    Participation Level:  Active Listener and Interactive    Participation Quality: Appropriate and Attentive    Speech:  normal    Thought Process/Content: Logical    Affective Functioning: Congruent    Mood: anxious, depressed     Level of consciousness:  Oriented x4    Response to Learning: Able to verbalize current knowledge/experience and Able to verbalize/acknowledge new learning    Endings: None Reported    Modes of Intervention: Education, Support, Socialization and Exploration    Discipline Responsible: /Counselor    Signature:  Tiffanie Ahmadi, [facility]

## 2021-02-12 NOTE — BH NOTE
Patient called and reported that she was going to be late. She initially stated that she would be here by 1230, but called back and reported that she will not make it to the first group but will be here by 2pm for the second group.

## 2021-02-17 ENCOUNTER — HOSPITAL ENCOUNTER (OUTPATIENT)
Dept: PSYCHIATRY | Age: 31
Setting detail: THERAPIES SERIES
Discharge: HOME | End: 2021-02-17
Payer: COMMERCIAL

## 2021-02-17 VITALS — TEMPERATURE: 98.2 F

## 2021-02-17 DIAGNOSIS — F60.3 BORDERLINE PERSONALITY DISORDER IN ADULT (HCC): ICD-10-CM

## 2021-02-17 PROCEDURE — 90853 GROUP PSYCHOTHERAPY: CPT | Performed by: COUNSELOR

## 2021-02-17 PROCEDURE — H2020 THER BEHAV SVC, PER DIEM: HCPCS

## 2021-02-17 NOTE — GROUP NOTE
Group Therapy Note    Date: 2/17/2021    Group Start Time:  3:15 PM  Group End Time:  4:15 PM  Group Topic: Psychoeducation    [facility] BHMIGUEL Bennettalyssia Rogers        Group Therapy Note    Group facilitator processed the statement \"I feel it in my core\", foundational beliefs and how experiences and learned behaviors influence perceptions. Patients provided educational materials to aide in identifying core beliefs, analyzing and challenging these beliefs. Attendees: 5         Patient's Goal:  Patient will reflect on individual core beliefs, explore supporting information, rejected information, and challenges to modifying learned core beliefs.     Notes:  Patient present and actively attentive to group discussions, reflective and engaging in support of peers' engagement in group processing. Engaged with education materials provided, wrote in reflection while analyzing core beliefs.      Status After Intervention:  Improved     Participation Level:  Active Listener and Interactive     Participation Quality: Appropriate, Sharing and Supportive        Speech:  normal        Thought Process/Content: Logical        Affective Functioning: Congruent        Mood: euthymic        Level of consciousness:  Alert and Attentive        Response to Learning: Capable of insight and Progressing to goal        Endings: None Reported     Modes of Intervention: Education, Support, Exploration, Clarifying, Problem-solving and Activity        Discipline Responsible: Psychoeducational Specialist        Signature:  Camilo Haines, MM, MT-BC

## 2021-02-19 ENCOUNTER — HOSPITAL ENCOUNTER (OUTPATIENT)
Dept: PSYCHIATRY | Age: 31
Setting detail: THERAPIES SERIES
Discharge: HOME | End: 2021-02-19
Payer: COMMERCIAL

## 2021-02-19 VITALS — TEMPERATURE: 97.7 F

## 2021-02-19 PROCEDURE — H2020 THER BEHAV SVC, PER DIEM: HCPCS

## 2021-02-19 NOTE — BH NOTE
Pt c/o weight gain of 9 lbs in 1 month, Seroquel prn for sleep too strong, having some chest soreness, and would like to take her Buspar twice a day instead of 3 times a day. Dr. Kane Barnes made aware. Will see pt on 2-22-21. Also to decrease Buspar 10 mg po to twice a day, decrease Seroquel 50mg po Q HS to Seroquel 25mg po Q HS or stop it. Pt made aware.

## 2021-02-19 NOTE — BH NOTE
Pt also made aware if she is having chest pain to call 911 or this nurse can escort her downstairs to ER. Pt verbalized understanding.

## 2021-02-19 NOTE — GROUP NOTE
Group Therapy Note    Date: 2/19/2021    Group Start Time: 12:30 PM  Group End Time:  1:45 PM  Group Topic: Psychotherapy    [facility] AN[facility]    Sophia Greenwood, ATR        Group Therapy Note    Attendees: 6         Patient's Goal:  Patient was invited to participate in Psychotherapy group and to set a goal at the beginning of session to practice in group a new way of being in relationships. Notes:  Jana Aguilar shared her goal was to [facility] about my anxiety about moving back in with my baby's father. \" Jana Aguilar was active in group discussion, gave supportive feedback to peers, and spoke of practicing better boundaries in her relationships. Status After Intervention:  Improved    Participation Level:  Active Listener and Interactive    Participation Quality: Appropriate, Attentive, Sharing and Supportive      Speech:  normal      Thought Process/Content: Logical      Affective Functioning: Blunted      Mood: anxious and depressed      Level of consciousness:  Alert, Oriented x4 and Attentive      Response to Learning: Able to verbalize current knowledge/experience, Able to verbalize/acknowledge new learning, Able to retain information and Capable of insight      Endings: None Reported    Modes of Intervention: Education, Support, Socialization, Exploration, Clarifying, Problem-solving and Activity      Discipline Responsible: Psychoeducational Specialist      Signature:  Willam Muro

## 2021-02-19 NOTE — GROUP NOTE
Group Therapy Note    Date: 2/19/2021    Group Start Time:  2:00 PM  Group End Time:  3:00 PM  Group Topic: Recovery    [facility]    Becky Cantu, [facility]        Group Therapy Note    Attendees: 7       Patient's Goal:  Patient will complete worksheet on boundaries and will discuss how they apply to mental wellbeing. Notes:  Patient attended group. Completed the worksheet and discussed in group. Verbalized a basic understanding of boundaries and shared examples of poor and healthy boundaries from life. Status After Intervention:  Improved    Participation Level:  Active Listener and Interactive    Participation Quality: Appropriate and Attentive    Speech:  normal    Thought Process/Content: Logical    Affective Functioning: Congruent    Mood: anxious, depressed     Level of consciousness:  Oriented x4    Response to Learning: Able to verbalize current knowledge/experience and Able to verbalize/acknowledge new learning    Endings: None Reported    Modes of Intervention: Education, Support, Socialization and Exploration    Discipline Responsible: /Counselor    Signature:  Becky Cantu [facility]

## 2021-02-22 ENCOUNTER — HOSPITAL ENCOUNTER (OUTPATIENT)
Dept: PSYCHIATRY | Age: 31
Setting detail: THERAPIES SERIES
Discharge: HOME | End: 2021-02-22
Payer: COMMERCIAL

## 2021-02-22 VITALS — DIASTOLIC BLOOD PRESSURE: 63 MMHG | TEMPERATURE: 96.9 F | SYSTOLIC BLOOD PRESSURE: 105 MMHG

## 2021-02-22 DIAGNOSIS — F60.3 BORDERLINE PERSONALITY DISORDER IN ADULT (HCC): ICD-10-CM

## 2021-02-22 PROCEDURE — H2020 THER BEHAV SVC, PER DIEM: HCPCS

## 2021-02-22 RX ORDER — TRAZODONE HYDROCHLORIDE 100 MG/1
100 TABLET ORAL NIGHTLY
Qty: 30 TABLET | Refills: 0 | Status: SHIPPED | OUTPATIENT
Start: 2021-02-22 | End: 2021-03-01 | Stop reason: HOSPADM

## 2021-02-22 RX ORDER — BUSPIRONE HYDROCHLORIDE 10 MG/1
10 TABLET ORAL 2 TIMES DAILY
Qty: 60 TABLET | Refills: 0 | Status: SHIPPED | OUTPATIENT
Start: 2021-02-22 | End: 2021-03-01

## 2021-02-22 RX ORDER — TRAZODONE HYDROCHLORIDE 100 MG/1
100 TABLET ORAL NIGHTLY
Status: DISCONTINUED | OUTPATIENT
Start: 2021-02-22 | End: 2021-02-23 | Stop reason: HOSPADM

## 2021-02-22 RX ORDER — BUSPIRONE HYDROCHLORIDE 10 MG/1
10 TABLET ORAL 2 TIMES DAILY
Status: DISCONTINUED | OUTPATIENT
Start: 2021-02-22 | End: 2021-02-23 | Stop reason: HOSPADM

## 2021-02-22 NOTE — GROUP NOTE
Group Therapy Note    Date: 2/22/2021    Group Start Time: 12:30 PM  Group End Time:  1:45 PM  Group Topic: Psychotherapy    [facility] ADDISON Nevarez, [facility]    Group Therapy Note    Attendees: 6    Patient shared and set a goal at the beginning of group to practice a new way of being in group today. Notes:  Pt set goal to [facility] about speaking with her mother in law\" and take accountability with her mother in law. Pt was quiet during group until prompted to share with peers. Pt shared her fears of returning to living with her child's father which she plans to do in a few months when her mother goes into the assisted living facility. Therapist and peers encouraged pt to identify boundaries going into this living situation. Status After Intervention:  Improved    Participation Level:  Active Listener and Interactive    Participation Quality: Appropriate and Attentive      Speech:  normal      Thought Process/Content: Linear      Affective Functioning: Flat and Constricted/Restricted      Mood: anxious and depressed      Level of consciousness:  Alert and Oriented x4      Response to Learning: Able to verbalize current knowledge/experience, Able to verbalize/acknowledge new learning and Able to retain information      Endings: None Reported    Modes of Intervention: Education, Support, Socialization, Exploration, Clarifying and Problem-solving      Discipline Responsible: /Counselor      Signature:  Janie Nevarez, MARIANNA-S, R-GERA

## 2021-02-22 NOTE — GROUP NOTE
Group Therapy Note    Date: 2/22/2021    Group Start Time:  3:15 PM  Group End Time:  4:30 PM  Group Topic: Cognitive Skills    BASIM KOLB I    [address]        Group Therapy Note    Attendees: 6         Patient's Goal: pt's learned and discussed self esteem and participated in group activity called  a handful of compliments. We discussed how others see us differently than we see ourselves and that we are our own worst critic. Pt's learned how to accept compliments and how to apply to our lives to increase our self esteem. Notes:  Pt actively participated in the group activity and discussion and was able to apply to herself. Pt reported that she liked hearing from others that they think  she is a good mom because pt often thinks she is not because she gets frustrated at times. Status After Intervention:  Improved    Participation Level:  Active Listener and Interactive    Participation Quality: Appropriate, Attentive, Sharing and Supportive      Speech:  normal      Thought Process/Content: Logical      Affective Functioning: Congruent      Mood: anxious and depressed      Level of consciousness:  Alert, Oriented x4 and Attentive      Response to Learning: Able to verbalize current knowledge/experience, Able to verbalize/acknowledge new learning and Progressing to goal      Endings: None Reported    Modes of Intervention: Education, Support, Socialization, Exploration, Clarifying and Activity      Discipline Responsible: /Counselor      Signature:  [address]

## 2021-02-22 NOTE — GROUP NOTE
Group Therapy Note    Date: 2/22/2021    Group Start Time:  2:00 PM  Group End Time:  3:00 PM  Group Topic: Art Therapy     [facility]    Sophia Greenwood, ATR        Group Therapy Note    Attendees: 6         Patient's Goal:  Patients were invited to to work together on a  community mural project for the [employer] in the outpatient community area, which had been started in a different group session. Patients were invited to discuss and depict what they want and need for their mental health recovery and were invited to collaborate together as a group with art supplies to create this interactive community mural.    Notes:  [facility] collaborated with peers to decide what to add to the mural, created a paper tree, butterfly, and wrote positive affirmations, and worked with peers to create instructions for the next group to continue working on the mural.    Status After Intervention:  Unchanged    Participation Level:  Active Listener and Interactive    Participation Quality: Appropriate, Attentive, Sharing and Supportive      Speech:  normal      Thought Process/Content: Logical      Affective Functioning: Blunted      Mood: anxious and depressed      Level of consciousness:  Alert, Oriented x4 and Attentive      Response to Learning: Able to verbalize current knowledge/experience, Able to verbalize/acknowledge new learning, Able to retain information and Capable of insight      Endings: None Reported    Modes of Intervention: Education, Support, Socialization, Exploration, Clarifying, Problem-solving, Activity and Media      Discipline Responsible: Psychoeducational Specialist      Signature:  Willam Brunson

## 2021-02-22 NOTE — PROGRESS NOTES
Final    Neutrophils % 01/21/2021 62.8  % Final    Lymphocytes % 01/21/2021 29.2  % Final    Monocytes % 01/21/2021 5.3  % Final    Eosinophils % 01/21/2021 1.8  % Final    Basophils % 01/21/2021 0.9  % Final    Neutrophils Absolute 01/21/2021 4.0  1.7 - 7.7 K/uL Final    Lymphocytes Absolute 01/21/2021 1.9  1.0 - 5.1 K/uL Final    Monocytes Absolute 01/21/2021 0.3  0.0 - 1.3 K/uL Final    Eosinophils Absolute 01/21/2021 0.1  0.0 - 0.6 K/uL Final    Basophils Absolute 01/21/2021 0.1  0.0 - 0.2 K/uL Final    Ethanol Lvl 01/21/2021 None Detected  mg/dL Final    Comment:    None Detected  Conversion factor:  100 mg/dl = .100 g/dl  For Medical Purposes Only      Color, UA 01/21/2021 Yellow  Straw/Yellow Final    Clarity, UA 01/21/2021 Clear  Clear Final    Glucose, Ur 01/21/2021 Negative  Negative mg/dL Final    Bilirubin Urine 01/21/2021 Negative  Negative Final    Ketones, Urine 01/21/2021 Negative  Negative mg/dL Final    Specific Gravity, UA 01/21/2021 1.020  1.005 - 1.030 Final    Blood, Urine 01/21/2021 MODERATE* Negative Final    pH, UA 01/21/2021 7.0  5.0 - 8.0 Final    Protein, UA 01/21/2021 Negative  Negative mg/dL Final    Urobilinogen, Urine 01/21/2021 0.2  <2.0 E.U./dL Final    Nitrite, Urine 01/21/2021 Negative  Negative Final    Leukocyte Esterase, Urine 01/21/2021 Negative  Negative Final    Microscopic Examination 01/21/2021 YES   Final    Urine Type 01/21/2021 NotGiven   Final    Urine received in a container without preservatives.     Sodium 01/21/2021 138  136 - 145 mmol/L Final    Potassium 01/21/2021 3.4* 3.5 - 5.1 mmol/L Final    Chloride 01/21/2021 107  99 - 110 mmol/L Final    CO2 01/21/2021 22  21 - 32 mmol/L Final    Anion Gap 01/21/2021 9  3 - 16 Final    Glucose 01/21/2021 112* 70 - 99 mg/dL Final    BUN 01/21/2021 12  7 - 20 mg/dL Final    CREATININE 01/21/2021 0.8  0.6 - 1.1 mg/dL Final    GFR Non- 01/21/2021 >60  >60 Final    Comment: >60 mL/min/1.73m2 EGFR, calc. for ages 25 and older using the  MDRD formula (not corrected for weight), is valid for stable  renal function.  GFR  01/21/2021 >60  >60 Final    Comment: Chronic Kidney Disease: less than 60 ml/min/1.73 sq.m. Kidney Failure: less than 15 ml/min/1.73 sq.m. Results valid for patients 18 years and older.  Calcium 01/21/2021 9.4  8.3 - 10.6 mg/dL Final    Total Protein 01/21/2021 7.0  6.4 - 8.2 g/dL Final    Albumin 01/21/2021 4.5  3.4 - 5.0 g/dL Final    Albumin/Globulin Ratio 01/21/2021 1.8  1.1 - 2.2 Final    Total Bilirubin 01/21/2021 0.5  0.0 - 1.0 mg/dL Final    Alkaline Phosphatase 01/21/2021 37* 40 - 129 U/L Final    ALT 01/21/2021 12  10 - 40 U/L Final    AST 01/21/2021 14* 15 - 37 U/L Final    Globulin 01/21/2021 2.5  g/dL Final    Salicylate, Serum 94/86/0550 <0.3* 15.0 - 30.0 mg/dL Final    Comment: Therapeutic Range: 15.0-30.0 mg/dL  Toxic: >30.0 mg/dL      Acetaminophen Level 01/21/2021 <5* 10 - 30 ug/mL Final    Comment: Therapeutic Range: 10.0-30.0 ug/mL  Toxic: >=150 ug/mL      Amphetamine Screen, Urine 01/21/2021 Neg  Negative <1000ng/mL Final    Barbiturate Screen, Ur 01/21/2021 Neg  Negative <200 ng/mL Final    Benzodiazepine Screen, Urine 01/21/2021 Neg  Negative <200 ng/mL Final    Cannabinoid Scrn, Ur 01/21/2021 POSITIVE* Negative <50 ng/mL Final    Cocaine Metabolite Screen, Urine 01/21/2021 Neg  Negative <300 ng/mL Final    Opiate Scrn, Ur 01/21/2021 Neg  Negative <300 ng/mL Final    Comment: \"Therapeutic levels of pain medication, especially oxycontin and synthetic  opioids, may not be detected by this Methodology. Pain management screen  panel  Drug panel-PM-Hi Res Ur, Interp (PAIN) should be considered for drug  monitoring \".       PCP Screen, Urine 01/21/2021 Neg  Negative <25 ng/mL Final    Methadone Screen, Urine 01/21/2021 Neg  Negative <300 ng/mL Final    Propoxyphene Scrn, Ur 01/21/2021 Neg  Negative <300 ng/mL Final    Oxycodone Urine 01/21/2021 Neg  Negative <100 ng/ml Final    pH, UA 01/21/2021 7.0   Final    Comment: Urine pH less than 5.0 or greater than 8.0 may indicate sample adulteration. Another sample should be collected if clinically  indicated.  Drug Screen Comment: 01/21/2021 see below   Final    Comment: This method is a screening test to detect only these drug  classes as part of a medical workup. Confirmatory testing  by another method should be ordered if clinically indicated.  SARS-CoV-2, NAAT 01/21/2021 Not Detected  Not Detected Final    Comment: Rapid NAAT:   Negative results should be treated as presumptive and,  if inconsistent with clinical signs and symptoms or necessary for  patient management, should be tested with an alternative molecular  assay. Negative results do not preclude SARS-CoV-2 infection and  should not be used as the sole basis for patient management decisions. This test has been authorized by the FDA under an Emergency Use  Authorization (EUA) for use by authorized laboratories.     Fact sheet for Healthcare Providers:  [employer]o  Fact sheet for Patients: Aroldo    METHODOLOGY: Isothermal Nucleic Acid Amplification      hCG Qual 01/21/2021 Negative  Detects HCG level >10 MIU/mL Final    Mucus, UA 01/21/2021 Rare* None Seen /LPF Final    WBC, UA 01/21/2021 3-5  0 - 5 /HPF Final    RBC, UA 01/21/2021 3-4  0 - 4 /HPF Final    Epithelial Cells, UA 01/21/2021 11-20* 0 - 5 /HPF Final    Bacteria, UA 01/21/2021 1+* None Seen /HPF Final    Cholesterol, Total 01/21/2021 115  0 - 199 mg/dL Final    Triglycerides 01/21/2021 92  0 - 150 mg/dL Final    HDL 01/21/2021 49  40 - 60 mg/dL Final    LDL Calculated 01/21/2021 48  <100 mg/dL Final    VLDL Cholesterol Calculated 01/21/2021 18  Not Established mg/dL Final    Hemoglobin A1C 01/21/2021 4.6  See comment % Final    Comment: Comment:  Diagnosis

## 2021-02-22 NOTE — PROGRESS NOTES
Department of Psychiatry  AttendingProgress Note  Chief Complaint: depression  Trena Dotson presents as brighter affectively. She is having difficulty with grogginess on Seroquel in AM. Is less dizzy with reduction in Buspar to 10 mg BID. Discussed adding trazodone at HS. Feels less impulsive. No SI. weight 116 lbs . Up from 109 in hospital.  Patient's chart was reviewed and collaborated with  about the treatment plan. SUBJECTIVE:    Patient is feeling better. Suicidal ideation:  denies suicidal ideation. Patient does not have medication side effects. ROS: Patient has new complaints: no  Sleeping adequately:  No:    Appetite adequate: Yes  Attending groups: Yes  Visitors:na    OBJECTIVE    Physical  VITALS:  /61   Pulse 105   Temp 97.8 °F (36.6 °C) (Tympanic)   Resp 20   Ht 5' 5\" (1.651 m)   Wt 105 lb (47.6 kg)   LMP 01/01/2021   SpO2 99%   BMI 17.47 kg/m²     Mental Status Examination:  Patients appearance was street clothes. Thoughts are Goal directed. Homicidal ideations none. No abnormal movements, tics or mannerisms. Memory intact Aims 0. Concentration Good. Alert and oriented X 4. Insight and Judgement impaired insight. Patient was cooperative.  Patient gait normal. Mood within normal limits, affect normal affect Hallucinations Absent, suicidal ideations no specific plan to harm self Speech normal volume  Data  Labs:   Admission on 01/21/2021, Discharged on 01/29/2021   Component Date Value Ref Range Status    WBC 01/21/2021 6.4  4.0 - 11.0 K/uL Final    RBC 01/21/2021 3.82* 4.00 - 5.20 M/uL Final    Hemoglobin 01/21/2021 13.2  12.0 - 16.0 g/dL Final    Hematocrit 01/21/2021 38.4  36.0 - 48.0 % Final    MCV 01/21/2021 100.4* 80.0 - 100.0 fL Final    MCH 01/21/2021 34.6* 26.0 - 34.0 pg Final    MCHC 01/21/2021 34.4  31.0 - 36.0 g/dL Final    RDW 01/21/2021 12.3* 12.4 - 15.4 % Final    Platelets 92/19/7587 178  135 - 450 K/uL Final    MPV 01/21/2021 8.3  5.0 - 10.5 fL Final    Neutrophils % 01/21/2021 62.8  % Final    Lymphocytes % 01/21/2021 29.2  % Final    Monocytes % 01/21/2021 5.3  % Final    Eosinophils % 01/21/2021 1.8  % Final    Basophils % 01/21/2021 0.9  % Final    Neutrophils Absolute 01/21/2021 4.0  1.7 - 7.7 K/uL Final    Lymphocytes Absolute 01/21/2021 1.9  1.0 - 5.1 K/uL Final    Monocytes Absolute 01/21/2021 0.3  0.0 - 1.3 K/uL Final    Eosinophils Absolute 01/21/2021 0.1  0.0 - 0.6 K/uL Final    Basophils Absolute 01/21/2021 0.1  0.0 - 0.2 K/uL Final    Ethanol Lvl 01/21/2021 None Detected  mg/dL Final    Comment:    None Detected  Conversion factor:  100 mg/dl = .100 g/dl  For Medical Purposes Only      Color, UA 01/21/2021 Yellow  Straw/Yellow Final    Clarity, UA 01/21/2021 Clear  Clear Final    Glucose, Ur 01/21/2021 Negative  Negative mg/dL Final    Bilirubin Urine 01/21/2021 Negative  Negative Final    Ketones, Urine 01/21/2021 Negative  Negative mg/dL Final    Specific Gravity, UA 01/21/2021 1.020  1.005 - 1.030 Final    Blood, Urine 01/21/2021 MODERATE* Negative Final    pH, UA 01/21/2021 7.0  5.0 - 8.0 Final    Protein, UA 01/21/2021 Negative  Negative mg/dL Final    Urobilinogen, Urine 01/21/2021 0.2  <2.0 E.U./dL Final    Nitrite, Urine 01/21/2021 Negative  Negative Final    Leukocyte Esterase, Urine 01/21/2021 Negative  Negative Final    Microscopic Examination 01/21/2021 YES   Final    Urine Type 01/21/2021 NotGiven   Final    Urine received in a container without preservatives.     Sodium 01/21/2021 138  136 - 145 mmol/L Final    Potassium 01/21/2021 3.4* 3.5 - 5.1 mmol/L Final    Chloride 01/21/2021 107  99 - 110 mmol/L Final    CO2 01/21/2021 22  21 - 32 mmol/L Final    Anion Gap 01/21/2021 9  3 - 16 Final    Glucose 01/21/2021 112* 70 - 99 mg/dL Final    BUN 01/21/2021 12  7 - 20 mg/dL Final    CREATININE 01/21/2021 0.8  0.6 - 1.1 mg/dL Final    GFR Non- 01/21/2021 >60  >60 Final    Comment: >60 mL/min/1.73m2 EGFR, calc. for ages 25 and older using the  MDRD formula (not corrected for weight), is valid for stable  renal function.  GFR  01/21/2021 >60  >60 Final    Comment: Chronic Kidney Disease: less than 60 ml/min/1.73 sq.m. Kidney Failure: less than 15 ml/min/1.73 sq.m. Results valid for patients 18 years and older.  Calcium 01/21/2021 9.4  8.3 - 10.6 mg/dL Final    Total Protein 01/21/2021 7.0  6.4 - 8.2 g/dL Final    Albumin 01/21/2021 4.5  3.4 - 5.0 g/dL Final    Albumin/Globulin Ratio 01/21/2021 1.8  1.1 - 2.2 Final    Total Bilirubin 01/21/2021 0.5  0.0 - 1.0 mg/dL Final    Alkaline Phosphatase 01/21/2021 37* 40 - 129 U/L Final    ALT 01/21/2021 12  10 - 40 U/L Final    AST 01/21/2021 14* 15 - 37 U/L Final    Globulin 01/21/2021 2.5  g/dL Final    Salicylate, Serum 78/87/2397 <0.3* 15.0 - 30.0 mg/dL Final    Comment: Therapeutic Range: 15.0-30.0 mg/dL  Toxic: >30.0 mg/dL      Acetaminophen Level 01/21/2021 <5* 10 - 30 ug/mL Final    Comment: Therapeutic Range: 10.0-30.0 ug/mL  Toxic: >=150 ug/mL      Amphetamine Screen, Urine 01/21/2021 Neg  Negative <1000ng/mL Final    Barbiturate Screen, Ur 01/21/2021 Neg  Negative <200 ng/mL Final    Benzodiazepine Screen, Urine 01/21/2021 Neg  Negative <200 ng/mL Final    Cannabinoid Scrn, Ur 01/21/2021 POSITIVE* Negative <50 ng/mL Final    Cocaine Metabolite Screen, Urine 01/21/2021 Neg  Negative <300 ng/mL Final    Opiate Scrn, Ur 01/21/2021 Neg  Negative <300 ng/mL Final    Comment: \"Therapeutic levels of pain medication, especially oxycontin and synthetic  opioids, may not be detected by this Methodology. Pain management screen  panel  Drug panel-PM-Hi Res Ur, Interp (PAIN) should be considered for drug  monitoring \".       PCP Screen, Urine 01/21/2021 Neg  Negative <25 ng/mL Final    Methadone Screen, Urine 01/21/2021 Neg  Negative <300 ng/mL Final    Propoxyphene Scrn, Ur 01/21/2021 Neg  Negative <300 ng/mL Final    Oxycodone Urine 01/21/2021 Neg  Negative <100 ng/ml Final    pH, UA 01/21/2021 7.0   Final    Comment: Urine pH less than 5.0 or greater than 8.0 may indicate sample adulteration. Another sample should be collected if clinically  indicated.  Drug Screen Comment: 01/21/2021 see below   Final    Comment: This method is a screening test to detect only these drug  classes as part of a medical workup. Confirmatory testing  by another method should be ordered if clinically indicated.  SARS-CoV-2, NAAT 01/21/2021 Not Detected  Not Detected Final    Comment: Rapid NAAT:   Negative results should be treated as presumptive and,  if inconsistent with clinical signs and symptoms or necessary for  patient management, should be tested with an alternative molecular  assay. Negative results do not preclude SARS-CoV-2 infection and  should not be used as the sole basis for patient management decisions. This test has been authorized by the FDA under an Emergency Use  Authorization (EUA) for use by authorized laboratories.     Fact sheet for Healthcare Providers:  Aroldo  Fact sheet for Patients: Aroldo    METHODOLOGY: Isothermal Nucleic Acid Amplification      hCG Qual 01/21/2021 Negative  Detects HCG level >10 MIU/mL Final    Mucus, UA 01/21/2021 Rare* None Seen /LPF Final    WBC, UA 01/21/2021 3-5  0 - 5 /HPF Final    RBC, UA 01/21/2021 3-4  0 - 4 /HPF Final    Epithelial Cells, UA 01/21/2021 11-20* 0 - 5 /HPF Final    Bacteria, UA 01/21/2021 1+* None Seen /HPF Final    Cholesterol, Total 01/21/2021 115  0 - 199 mg/dL Final    Triglycerides 01/21/2021 92  0 - 150 mg/dL Final    HDL 01/21/2021 49  40 - 60 mg/dL Final    LDL Calculated 01/21/2021 48  <100 mg/dL Final    VLDL Cholesterol Calculated 01/21/2021 18  Not Established mg/dL Final    Hemoglobin A1C 01/21/2021 4.6  See comment % Final    Comment: Comment:  Diagnosis of Diabetes: > or = 6.5%  Increased risk of diabetes (Prediabetes): 5.7-6.4%  Glycemic Control: Nonpregnant Adults: <7.0%                    Pregnant: <6.0%        eAG 01/21/2021 85.3  mg/dL Final    TSH 01/21/2021 0.67  0.27 - 4.20 uIU/mL Final    HCG(Urine) Pregnancy Test 01/29/2021 Negative  Detects HCG level >20 MIU/mL Final    Comment: Note:  Always repeat results in question with a serum  quantitative pregnancy test. A serum hCG is positive  2-5 days before the urine hCG test.              Medications  No current facility-administered medications for this encounter. ASSESSMENT AND PLAN    Principal Problem:    Severe episode of recurrent major depressive disorder, without psychotic features ([facility] [address]  Active Problems:    Cannabis abuse    Borderline personality disorder in [facility])    Suicidal ideation    Moderate protein-calorie malnutrition ([facility] [address]  Resolved Problems:    * No resolved hospital problems. *       1. Patient s symptoms   are improving. Add Trazodone 100 mg HS insomnia. Explained risk/benefit/SE. DC Seroquel. Buspar 10 mg BID  2. Probable discharge is 1 week  3. Discharge planning is complete  4. Suicidal ideation is none  5. Total time with patient was 40 minutes and more than 50 % of that time was spent counseling the patient on their symptoms, treatment and expected goals.

## 2021-02-24 ENCOUNTER — HOSPITAL ENCOUNTER (OUTPATIENT)
Dept: PSYCHIATRY | Age: 31
Setting detail: THERAPIES SERIES
Discharge: HOME | End: 2021-02-24
Payer: COMMERCIAL

## 2021-02-24 VITALS — TEMPERATURE: 97.8 F

## 2021-02-24 PROCEDURE — H2020 THER BEHAV SVC, PER DIEM: HCPCS | Performed by: COUNSELOR

## 2021-02-24 NOTE — GROUP NOTE
Group Therapy Note    Date: 2/24/2021    Group Start Time:  3:15 PM  Group End Time:  4:15 PM  Group Topic: Psychoeducation    VINAYZ CORTES BHMIGUEL Mendozamagdyalyssia Rogers        Group Therapy Note    Topic: Mindful Self-Compassion     Mode of Intervention: Marge Analysis and Education    Group started with members identifying one aspect of themselves that they are learning to be more patient with. Therapist then facilitated marge analysis intervention focused on self-compassion and co-dependence. Group members were provided materials to engage in education discussion: The Effects of Mindful Self-Compassion. Group discussions mostly focused on the 2 aspects of self-compassion: (comforting, soothing, validating / protecting, providing, motivating). Attendees: 5         Notes:  Pt present and actively engaged across discussions and interventions, reflecting on relationship with her baby's father, describing her desire for him to provide validation and words of affirmation, describing a more positive response to external sources of validation than self. Receptive to feedback from peers, expressed desire to engage in letter-writing exercise to practice mindful self-compassion. Status After Intervention:  Improved    Participation Level:  Active Listener and Interactive    Participation Quality: Appropriate, Sharing and Supportive      Speech:  normal      Thought Process/Content: Logical      Affective Functioning: Congruent      Mood: Positive and relaxed      Level of consciousness:  Alert and Oriented x4      Response to Learning: Able to verbalize current knowledge/experience, Able to verbalize/acknowledge new learning, Capable of insight and Progressing to goal      Endings: None Reported    Modes of Intervention: Education, Support, Socialization, Exploration and Clarifying      Discipline Responsible: Psychoeducational Specialist      Signature: Melvin Ureña, MM, MT-BC

## 2021-02-24 NOTE — GROUP NOTE
Group Therapy Note    Date: 2/24/2021    Group Start Time:  2:00 PM  Group End Time:  3:00 PM  Group Topic: Recovery    [facility]    Sarah Staley, [facility]        Group Therapy Note    Attendees: 6         Patient's Goal:  Patient will complete worksheet on Living with Fear. Will explore ways that fear effects mood and mental health. Will explore how fear effects various areas of life. Notes: Patient attended group. Completed the worksheet and discussed. Patient gave examples of how fear affects her relationships. Talked about how fear effects other areas of life. Received support and feedback from peers. Status After Intervention:  Improved    Participation Level:  Active Listener and Interactive    Participation Quality: Appropriate, Attentive, Sharing and Supportive    Speech:  normal    Thought Process/Content: Logical Linear    Affective Functioning: Congruent    Mood: anxious and depressed    Level of consciousness:  Oriented x4    Response to Learning: Able to verbalize current knowledge/experience    Endings: None Reported    Modes of Intervention: Education, Support, Socialization and Exploration    Discipline Responsible: /Counselor    Signature:  Sarah Staley, [facility]

## 2021-02-24 NOTE — GROUP NOTE
Group Therapy Note    Date: 2/24/2021    Group Start Time: 12:40 PM  Group End Time:  1:45 PM  Group Topic: Psychotherapy    [facility]    Shira Siddiqui, [facility]        Group Therapy Note    Attendees: 6         Patient's Goal:  Pt's goal was to accept herself and focus on her self. Notes:  Pt was engaged in group. Pt shared her concerns about discharging, moving in with her son and her son's father and her mom moving to a nursing home. Pt was supportive and gave feedback. Pt met her goal.     Status After Intervention:  Improved    Participation Level:  Active Listener and Interactive    Participation Quality: Appropriate, Attentive, Sharing and Supportive      Speech:  normal      Thought Process/Content: Logical  Linear      Affective Functioning: Congruent      Mood: anxious      Level of consciousness:  Alert, Oriented x4 and Attentive      Response to Learning: Able to verbalize current knowledge/experience, Able to verbalize/acknowledge new learning, Able to retain information, Capable of insight, Able to change behavior and Progressing to goal      Endings: None Reported    Modes of Intervention: Education, Support, Socialization, Exploration, Clarifying, Problem-solving, Activity, Movement, Confrontation, Limit-setting and Reality-testing      Discipline Responsible: /Counselor      Signature:  Araseli Edge [address]

## 2021-02-26 ENCOUNTER — HOSPITAL ENCOUNTER (OUTPATIENT)
Dept: PSYCHIATRY | Age: 31
Setting detail: THERAPIES SERIES
Discharge: HOME | End: 2021-02-26
Payer: COMMERCIAL

## 2021-02-26 VITALS — TEMPERATURE: 96.5 F

## 2021-02-26 DIAGNOSIS — F60.3 BORDERLINE PERSONALITY DISORDER IN ADULT (HCC): ICD-10-CM

## 2021-02-26 PROCEDURE — H2020 THER BEHAV SVC, PER DIEM: HCPCS | Performed by: COUNSELOR

## 2021-02-26 NOTE — GROUP NOTE
Group Therapy Note    Date: 2/26/2021    Group Start Time:  2:00 PM  Group End Time:  3:00 PM  Group Topic: Recovery    [facility]    [place] [employer]        Group Therapy Note    Attendees: 7       Patient's Goal:  Patient will complete worksheet on acceptance. Will discuss how acceptance within codependency contributes to positive mental health. Notes:  Patient engaged well in group. Completed the codependency worksheet and discussed. Verbalized an understanding of the topic and gave examples of how acceptance applied to life. Status After Intervention:  Improved    Participation Level:  Active Listener and Interactive    Participation Quality: Appropriate, Attentive, Sharing and Supportive    Speech:  normal    Thought Process/Content: Logical Linear    Affective Functioning: Congruent    Mood: anxious and depressed    Level of consciousness:  Oriented x4    Response to Learning: Able to verbalize current knowledge/experience    Endings: None Reported    Modes of Intervention: Education, Support, Socialization and Exploration    Discipline Responsible: /Counselor    Signature:  [place]

## 2021-02-26 NOTE — GROUP NOTE
Group Therapy Note    Date: 2/26/2021    Group Start Time:  3:15 PM  Group End Time:  4:15 PM  Group Topic: Psychoeducation    [facility]    Delon Rogers        Group Therapy Note    Topic: The Power of Acceptance     Group members engaged in collaborative discussion of acceptance versus resignation/resistance. Therapist provided education material written by Dayna Russo, , focused on nonjudgmental acceptance. Attendees: 7         Notes:  During group pt explored challenges in acceptance as it relates to prioritizing her children, expressing \"It's hard to be accepting when I have to think of my child first.\" Provided feedback and encouragement to peers across session, engaged with education material and verbalized understanding of the topic. Status After Intervention:  Improved    Participation Level:  Active Listener and Interactive    Participation Quality: Appropriate, Sharing and Supportive      Speech:  normal      Thought Process/Content: Logical      Affective Functioning: Congruent      Mood: positive and relaxed      Level of consciousness:  Alert and Oriented x4      Response to Learning: Able to verbalize current knowledge/experience, Able to verbalize/acknowledge new learning, Capable of insight and Progressing to goal      Endings: None Reported    Modes of Intervention: Education, Support, Socialization, Exploration and Clarifying      Discipline Responsible: Psychoeducational Specialist      Signature:  Ok Mooney, MM, MT-BC

## 2021-03-01 ENCOUNTER — HOSPITAL ENCOUNTER (OUTPATIENT)
Dept: PSYCHIATRY | Age: 31
Setting detail: THERAPIES SERIES
Discharge: HOME | End: 2021-03-01
Payer: COMMERCIAL

## 2021-03-01 VITALS — SYSTOLIC BLOOD PRESSURE: 106 MMHG | HEART RATE: 84 BPM | TEMPERATURE: 96.9 F | DIASTOLIC BLOOD PRESSURE: 52 MMHG

## 2021-03-01 DIAGNOSIS — F33.2 SEVERE EPISODE OF RECURRENT MAJOR DEPRESSIVE DISORDER, WITHOUT PSYCHOTIC FEATURES (HCC): Primary | ICD-10-CM

## 2021-03-01 DIAGNOSIS — F60.3 BORDERLINE PERSONALITY DISORDER IN ADULT (HCC): ICD-10-CM

## 2021-03-01 PROCEDURE — H2020 THER BEHAV SVC, PER DIEM: HCPCS | Performed by: COUNSELOR

## 2021-03-01 PROCEDURE — 99215 OFFICE O/P EST HI 40 MIN: CPT | Performed by: PSYCHIATRY & NEUROLOGY

## 2021-03-01 PROCEDURE — 90853 GROUP PSYCHOTHERAPY: CPT | Performed by: COUNSELOR

## 2021-03-01 RX ORDER — BUSPIRONE HYDROCHLORIDE 10 MG/1
10 TABLET ORAL 2 TIMES DAILY
Qty: 60 TABLET | Refills: 0 | Status: SHIPPED | OUTPATIENT
Start: 2021-03-01 | End: 2021-03-01 | Stop reason: SDUPTHER

## 2021-03-01 RX ORDER — BUSPIRONE HYDROCHLORIDE 10 MG/1
10 TABLET ORAL 2 TIMES DAILY
Qty: 60 TABLET | Refills: 0 | OUTPATIENT
Start: 2021-03-01 | End: 2021-03-31

## 2021-03-01 RX ORDER — TRAZODONE HYDROCHLORIDE 100 MG/1
100 TABLET ORAL NIGHTLY
Qty: 30 TABLET | Refills: 0 | Status: SHIPPED | OUTPATIENT
Start: 2021-03-01 | End: 2021-03-01 | Stop reason: SDUPTHER

## 2021-03-01 RX ORDER — LORAZEPAM 1 MG/1
1 TABLET ORAL 3 TIMES DAILY PRN
Qty: 10 TABLET | Refills: 0 | OUTPATIENT
Start: 2021-03-01 | End: 2021-03-01 | Stop reason: SDUPTHER

## 2021-03-01 RX ORDER — TRAZODONE HYDROCHLORIDE 100 MG/1
100 TABLET ORAL NIGHTLY
Qty: 30 TABLET | Refills: 0 | OUTPATIENT
Start: 2021-03-01 | End: 2022-08-30 | Stop reason: ALTCHOICE

## 2021-03-01 RX ORDER — LORAZEPAM 1 MG/1
1 TABLET ORAL DAILY PRN
Qty: 10 TABLET | Refills: 0 | OUTPATIENT
Start: 2021-03-01 | End: 2021-03-01

## 2021-03-01 RX ORDER — LORAZEPAM 1 MG/1
1 TABLET ORAL DAILY PRN
Qty: 10 TABLET | Refills: 0 | OUTPATIENT
Start: 2021-03-01 | End: 2021-04-01
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
## 2021-03-01 NOTE — GROUP NOTE
Group Therapy Note    Date: 3/1/2021    Group Start Time:  2:00 PM  Group End Time:  3:00 PM  Group Topic: Recovery    [facility]    Araseli Ahumada 53        Group Therapy Note    Attendees: 7       Patient's Goal:  Patient will complete worksheet on acceptance. Will discuss how acceptance in life contributes to positive mental health. Notes:  Patient engaged well in group. Completed the worksheet and discussed. Verbalized an understanding of the topic and gave examples of how acceptance applied to life. Completed a gratitude list and engaged in quiet time to reflect on these things. Status After Intervention:  Improved    Participation Level:  Active Listener and Interactive    Participation Quality: Appropriate, Attentive, Sharing and Supportive    Speech:  normal    Thought Process/Content: Logical Linear    Affective Functioning: Congruent    Mood: anxious and depressed    Level of consciousness:  Oriented x4    Response to Learning: Able to verbalize current knowledge/experience    Endings: None Reported    Modes of Intervention: Education, Support, Socialization and Exploration    Discipline Responsible: /Counselor    Signature:  Araseli Ahumada 53
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
## 2022-08-30 ENCOUNTER — FOLLOWUP TELEPHONE ENCOUNTER (OUTPATIENT)
Dept: PSYCHIATRY | Age: 32
End: 2022-08-30

## 2022-08-30 ENCOUNTER — HOSPITAL ENCOUNTER (INPATIENT)
Age: 32
LOS: 5 days | Discharge: HOME | DRG: 751 | End: 2022-09-04
Attending: STUDENT IN AN ORGANIZED HEALTH CARE EDUCATION/TRAINING PROGRAM | Admitting: PSYCHIATRY & NEUROLOGY
Payer: COMMERCIAL

## 2022-08-30 DIAGNOSIS — Z72.89 DELIBERATE SELF-CUTTING: ICD-10-CM

## 2022-08-30 DIAGNOSIS — R45.851 SUICIDAL IDEATION: Primary | ICD-10-CM

## 2022-08-30 PROBLEM — F33.9 MDD (MAJOR DEPRESSIVE DISORDER), RECURRENT EPISODE (HCC): Status: ACTIVE | Noted: 2022-08-30

## 2022-08-30 LAB
A/G RATIO: 1.9 (ref 1.1–2.2)
ACETAMINOPHEN LEVEL: <5 UG/ML (ref 10–30)
ALBUMIN SERPL-MCNC: 4.8 G/DL (ref 3.4–5)
ALP BLD-CCNC: 46 U/L (ref 40–129)
ALT SERPL-CCNC: 13 U/L (ref 10–40)
AMPHETAMINE SCREEN, URINE: ABNORMAL
ANION GAP SERPL CALCULATED.3IONS-SCNC: 9 MMOL/L (ref 3–16)
AST SERPL-CCNC: 15 U/L (ref 15–37)
BARBITURATE SCREEN URINE: ABNORMAL
BASOPHILS ABSOLUTE: 0 K/UL (ref 0–0.2)
BASOPHILS RELATIVE PERCENT: 0.7 %
BENZODIAZEPINE SCREEN, URINE: ABNORMAL
BILIRUB SERPL-MCNC: 0.6 MG/DL (ref 0–1)
BUN BLDV-MCNC: 11 MG/DL (ref 7–20)
CALCIUM SERPL-MCNC: 9.1 MG/DL (ref 8.3–10.6)
CANNABINOID SCREEN URINE: POSITIVE
CHLORIDE BLD-SCNC: 104 MMOL/L (ref 99–110)
CO2: 26 MMOL/L (ref 21–32)
COCAINE METABOLITE SCREEN URINE: ABNORMAL
CREAT SERPL-MCNC: 0.9 MG/DL (ref 0.6–1.1)
EOSINOPHILS ABSOLUTE: 0.2 K/UL (ref 0–0.6)
EOSINOPHILS RELATIVE PERCENT: 3.1 %
ETHANOL: NORMAL MG/DL (ref 0–0.08)
GFR AFRICAN AMERICAN: >60
GFR NON-AFRICAN AMERICAN: >60
GLUCOSE BLD-MCNC: 100 MG/DL (ref 70–99)
HCG QUALITATIVE: NEGATIVE
HCT VFR BLD CALC: 39.6 % (ref 36–48)
HEMOGLOBIN: 13.6 G/DL (ref 12–16)
INFLUENZA A: NOT DETECTED
INFLUENZA B: NOT DETECTED
LYMPHOCYTES ABSOLUTE: 2.3 K/UL (ref 1–5.1)
LYMPHOCYTES RELATIVE PERCENT: 37.4 %
Lab: ABNORMAL
MCH RBC QN AUTO: 33.5 PG (ref 26–34)
MCHC RBC AUTO-ENTMCNC: 34.4 G/DL (ref 31–36)
MCV RBC AUTO: 97.4 FL (ref 80–100)
METHADONE SCREEN, URINE: ABNORMAL
MONOCYTES ABSOLUTE: 0.4 K/UL (ref 0–1.3)
MONOCYTES RELATIVE PERCENT: 6 %
NEUTROPHILS ABSOLUTE: 3.3 K/UL (ref 1.7–7.7)
NEUTROPHILS RELATIVE PERCENT: 52.8 %
OPIATE SCREEN URINE: ABNORMAL
OXYCODONE URINE: ABNORMAL
PDW BLD-RTO: 12.5 % (ref 12.4–15.4)
PH UA: 5
PHENCYCLIDINE SCREEN URINE: ABNORMAL
PLATELET # BLD: 201 K/UL (ref 135–450)
PMV BLD AUTO: 7.5 FL (ref 5–10.5)
POTASSIUM REFLEX MAGNESIUM: 4.4 MMOL/L (ref 3.5–5.1)
PROPOXYPHENE SCREEN: ABNORMAL
RBC # BLD: 4.06 M/UL (ref 4–5.2)
SALICYLATE, SERUM: <0.3 MG/DL (ref 15–30)
SARS-COV-2 RNA, RT PCR: NOT DETECTED
SODIUM BLD-SCNC: 139 MMOL/L (ref 136–145)
TOTAL PROTEIN: 7.3 G/DL (ref 6.4–8.2)
TSH SERPL DL<=0.05 MIU/L-ACNC: 0.71 UIU/ML (ref 0.27–4.2)
WBC # BLD: 6.3 K/UL (ref 4–11)

## 2022-08-30 PROCEDURE — 80061 LIPID PANEL: CPT

## 2022-08-30 PROCEDURE — 36415 COLL VENOUS BLD VENIPUNCTURE: CPT

## 2022-08-30 PROCEDURE — 87636 SARSCOV2 & INF A&B AMP PRB: CPT

## 2022-08-30 PROCEDURE — 80307 DRUG TEST PRSMV CHEM ANLYZR: CPT

## 2022-08-30 PROCEDURE — 99285 EMERGENCY DEPT VISIT HI MDM: CPT

## 2022-08-30 PROCEDURE — 80179 DRUG ASSAY SALICYLATE: CPT

## 2022-08-30 PROCEDURE — 83036 HEMOGLOBIN GLYCOSYLATED A1C: CPT

## 2022-08-30 PROCEDURE — 1240000000 HC EMOTIONAL WELLNESS R&B

## 2022-08-30 PROCEDURE — 82077 ASSAY SPEC XCP UR&BREATH IA: CPT

## 2022-08-30 PROCEDURE — 84443 ASSAY THYROID STIM HORMONE: CPT

## 2022-08-30 PROCEDURE — 80053 COMPREHEN METABOLIC PANEL: CPT

## 2022-08-30 PROCEDURE — 85025 COMPLETE CBC W/AUTO DIFF WBC: CPT

## 2022-08-30 PROCEDURE — 6370000000 HC RX 637 (ALT 250 FOR IP)

## 2022-08-30 PROCEDURE — 80143 DRUG ASSAY ACETAMINOPHEN: CPT

## 2022-08-30 PROCEDURE — 84703 CHORIONIC GONADOTROPIN ASSAY: CPT

## 2022-08-30 RX ORDER — DIPHENHYDRAMINE HYDROCHLORIDE 50 MG/ML
50 INJECTION INTRAMUSCULAR; INTRAVENOUS EVERY 4 HOURS PRN
Status: DISCONTINUED | OUTPATIENT
Start: 2022-08-30 | End: 2022-09-04 | Stop reason: HOSPADM

## 2022-08-30 RX ORDER — DIPHENHYDRAMINE HCL 25 MG
50 TABLET ORAL ONCE
Status: DISCONTINUED | OUTPATIENT
Start: 2022-08-30 | End: 2022-09-04 | Stop reason: HOSPADM

## 2022-08-30 RX ORDER — ACETAMINOPHEN 325 MG/1
650 TABLET ORAL EVERY 4 HOURS PRN
Status: DISCONTINUED | OUTPATIENT
Start: 2022-08-30 | End: 2022-09-04 | Stop reason: HOSPADM

## 2022-08-30 RX ORDER — POLYETHYLENE GLYCOL 3350 17 G
2 POWDER IN PACKET (EA) ORAL
Status: DISCONTINUED | OUTPATIENT
Start: 2022-08-30 | End: 2022-09-04 | Stop reason: HOSPADM

## 2022-08-30 RX ORDER — MAGNESIUM HYDROXIDE/ALUMINUM HYDROXICE/SIMETHICONE 120; 1200; 1200 MG/30ML; MG/30ML; MG/30ML
30 SUSPENSION ORAL EVERY 6 HOURS PRN
Status: DISCONTINUED | OUTPATIENT
Start: 2022-08-30 | End: 2022-09-04 | Stop reason: HOSPADM

## 2022-08-30 RX ORDER — OLANZAPINE 5 MG/1
5 TABLET ORAL EVERY 6 HOURS PRN
Status: DISCONTINUED | OUTPATIENT
Start: 2022-08-30 | End: 2022-08-31

## 2022-08-30 RX ORDER — TRAZODONE HYDROCHLORIDE 50 MG/1
50 TABLET ORAL NIGHTLY PRN
Status: DISCONTINUED | OUTPATIENT
Start: 2022-08-30 | End: 2022-09-04 | Stop reason: HOSPADM

## 2022-08-30 RX ORDER — HYDROXYZINE 50 MG/1
50 TABLET, FILM COATED ORAL 3 TIMES DAILY PRN
Status: DISCONTINUED | OUTPATIENT
Start: 2022-08-30 | End: 2022-08-31

## 2022-08-30 RX ORDER — IBUPROFEN 400 MG/1
400 TABLET ORAL EVERY 6 HOURS PRN
Status: DISCONTINUED | OUTPATIENT
Start: 2022-08-30 | End: 2022-09-01

## 2022-08-30 RX ADMIN — OLANZAPINE 5 MG: 5 TABLET, FILM COATED ORAL at 21:49

## 2022-08-30 RX ADMIN — HYDROXYZINE HYDROCHLORIDE 50 MG: 50 TABLET, FILM COATED ORAL at 19:59

## 2022-08-30 ASSESSMENT — SLEEP AND FATIGUE QUESTIONNAIRES
DO YOU HAVE DIFFICULTY SLEEPING: YES
AVERAGE NUMBER OF SLEEP HOURS: 3
SLEEP PATTERN: DIFFICULTY FALLING ASLEEP;DISTURBED/INTERRUPTED SLEEP;INSOMNIA;NIGHTMARES/TERRORS
DO YOU USE A SLEEP AID: YES

## 2022-08-30 ASSESSMENT — LIFESTYLE VARIABLES
HOW MANY STANDARD DRINKS CONTAINING ALCOHOL DO YOU HAVE ON A TYPICAL DAY: 1 OR 2
HOW OFTEN DO YOU HAVE A DRINK CONTAINING ALCOHOL: MONTHLY OR LESS
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
## 2022-08-30 NOTE — ED NOTES
Patient taken to [facility] per w/c per [facility] and security.       Sandy Small RN  08/30/22 [id number]

## 2022-08-30 NOTE — ED PROVIDER NOTES
[address] ED      CHIEF COMPLAINT  Suicidal ideation       HISTORY OF PRESENT ILLNESS  Marc Mendoza is a 34 y.o. female with a past medical history of depression, suicidal ideation, borderline personality disorder, self harming behavior who presents to the ED complaining of suicidal ideation and self harming behavior. Suicidal ideation: 1m suffering with depression, SI intermittently  Plan: cutting, stab self  Previous attempts: cutting  Homicidal ideation: reports aggressive behavior towards previous partner  Access to firearms: denies  Audiovisual hallucinations: hear voices  Psychiatric medications: denies, stopped taking some time ago  Tobacco use: yes  Alcohol use: denies  Illicit drug use: 1 tablet clonipine yesterday    Somatic complaints: denies    Tetanus: 2016    Old records reviewed: No pertinent information noted. No other complaints, modifying factors or associated symptoms. I have reviewed the following from the nursing documentation.     Past Medical History:   Diagnosis Date    Anxiety     Asthma     Depression     IBS (irritable bowel syndrome)     Migraine     Tachycardia     causes fainting     Past Surgical History:   Procedure Laterality Date    APPENDECTOMY      COLONOSCOPY      DILATION AND CURETTAGE OF UTERUS      OTHER SURGICAL HISTORY      pt states she had pre-cancerous cells removed from uterus    UPPER GASTROINTESTINAL ENDOSCOPY       Family History   Problem Relation Age of Onset    Mental Illness Mother      Social History     Socioeconomic History    Marital status: Single     Spouse name: Not on file    Number of children: 1    Years of education: 12    Highest education level: Not on file   Occupational History     Employer: [employer]   Tobacco Use    Smoking status: Every Day     Packs/day: 1.00     Types: Cigarettes    Smokeless tobacco: Never   Vaping Use    Vaping Use: Never used   Substance and Sexual Activity    Alcohol use: Not Currently     Comment: rare    Drug use: Yes     Types: Marijuana Delona Members)     Comment: daily    Sexual activity: Yes     Partners: Male   Other Topics Concern    Not on file   Social History Narrative    Not on file     Social Determinants of Health     Financial Resource Strain: Not on file   Food Insecurity: Not on file   Transportation Needs: Not on file   Physical Activity: Not on file   Stress: Not on file   Social Connections: Not on file   Intimate Partner Violence: Not on file   Housing Stability: Not on file     Current Facility-Administered Medications   Medication Dose Route Frequency Provider Last Rate Last Admin    tetanus & diphtheria toxoids (adult) 5-2 LFU injection 0.5 mL  0.5 mL IntraMUSCular Prior to discharge Carolyn Arredondo MD        acetaminophen (TYLENOL) tablet 650 mg  650 mg Oral Q4H PRN Julaine Blotter, APRN - CNP        ibuprofen (ADVIL;MOTRIN) tablet 400 mg  400 mg Oral Q6H PRN Julaine Blotter, APRN - CNP        magnesium hydroxide (MILK OF MAGNESIA) 400 MG/5ML suspension 30 mL  30 mL Oral Daily PRN Julaine Blotter, APRN - CNP        nicotine polacrilex (COMMIT) lozenge 2 mg  2 mg Oral Q1H PRN Julaine Blotter, APRN - CNP        aluminum & magnesium hydroxide-simethicone (MAALOX) 200-200-20 MG/5ML suspension 30 mL  30 mL Oral Q6H PRN Julaine Blotter, APRN - CNP        hydrOXYzine HCl (ATARAX) tablet 50 mg  50 mg Oral TID PRN Julaine Blotter, APRN - CNP   50 mg at 08/30/22 1959    OLANZapine (ZYPREXA) tablet 5 mg  5 mg Oral Q6H PRN Julaine Blotter, APRN - CNP   5 mg at 08/30/22 2149    Or    OLANZapine (ZYPREXA) 10 mg in sterile water 2 mL injection  10 mg IntraMUSCular Q6H PRN Julaine Blotter, APRN - CNP        diphenhydrAMINE (BENADRYL) injection 50 mg  50 mg IntraMUSCular Q4H PRN Julaine Blotter, APRN - CNP        traZODone (DESYREL) tablet 50 mg  50 mg Oral Nightly PRN Julaine Blotter, APRN - CNP        diphenhydrAMINE (BENADRYL) tablet 50 mg  50 mg Oral Once Julaine Blotter, APRN - CNP         Allergies   Allergen Reactions    Penicillins Hives       REVIEW OF SYSTEMS  All systems reviewed, pertinent positives per HPI otherwise noted to be negative. PHYSICAL EXAM  /84   Pulse 80   Temp 98.1 °F (36.7 °C) (Oral)   Resp 16   LMP 08/25/2022   SpO2 100%    GENERAL APPEARANCE: Awake and alert. Cooperative. no distress. HENT: Normocephalic. Atraumatic. Mucous membranes are moist  NECK: Supple. Full range of motion of the neck without stiffness or pain. EYES: PERRL. EOM's grossly intact. HEART/CHEST: RRR. No murmurs. LUNGS: Respirations unlabored. CTAB. Good air exchange. Speaking comfortably in full sentences. ABDOMEN: No tenderness. Soft. Non-distended. No masses. No organomegaly. No guarding or rebound. MUSCULOSKELETAL: No extremity edema. Compartments soft. No deformity. No tenderness in the extremities. All extremities neurovascularly intact. SKIN: Warm and dry. No acute rashes. Numerous superficial cuts to the extremities and abdomen  NEUROLOGICAL: Alert and oriented. No gross facial drooping. Strength 5/5, sensation intact. PSYCHIATRIC: Flat affect. Depressed mood. Reports suicidal ideation. LABS  I have reviewed all labs for this visit.    Results for orders placed or performed during the hospital encounter of 08/30/22   COVID-19 & Influenza Combo    Specimen: Nasopharyngeal Swab   Result Value Ref Range    SARS-CoV-2 RNA, RT PCR NOT DETECTED NOT DETECTED    INFLUENZA A NOT DETECTED NOT DETECTED    INFLUENZA B NOT DETECTED NOT DETECTED   CBC with Auto Differential   Result Value Ref Range    WBC 6.3 4.0 - 11.0 K/uL    RBC 4.06 4.00 - 5.20 M/uL    Hemoglobin 13.6 12.0 - 16.0 g/dL    Hematocrit 39.6 36.0 - 48.0 %    MCV 97.4 80.0 - 100.0 fL    MCH 33.5 26.0 - 34.0 pg    MCHC 34.4 31.0 - 36.0 g/dL    RDW 12.5 12.4 - 15.4 %    Platelets 778 138 - 624 K/uL    MPV 7.5 5.0 - 10.5 fL    Neutrophils % 52.8 %    Lymphocytes % 37.4 %    Monocytes % 6.0 %    Eosinophils % 3.1 %    Basophils % 0.7 %    Neutrophils Absolute 3.3 1.7 - 7.7 K/uL    Lymphocytes Absolute 2.3 1.0 - 5.1 K/uL    Monocytes Absolute 0.4 0.0 - 1.3 K/uL    Eosinophils Absolute 0.2 0.0 - 0.6 K/uL    Basophils Absolute 0.0 0.0 - 0.2 K/uL   CMP w/ Reflex to MG   Result Value Ref Range    Sodium 139 136 - 145 mmol/L    Potassium reflex Magnesium 4.4 3.5 - 5.1 mmol/L    Chloride 104 99 - 110 mmol/L    CO2 26 21 - 32 mmol/L    Anion Gap 9 3 - 16    Glucose 100 (H) 70 - 99 mg/dL    BUN 11 7 - 20 mg/dL    Creatinine 0.9 0.6 - 1.1 mg/dL    GFR Non-African American >60 >60    GFR African American >60 >60    Calcium 9.1 8.3 - 10.6 mg/dL    Total Protein 7.3 6.4 - 8.2 g/dL    Albumin 4.8 3.4 - 5.0 g/dL    Albumin/Globulin Ratio 1.9 1.1 - 2.2    Total Bilirubin 0.6 0.0 - 1.0 mg/dL    Alkaline Phosphatase 46 40 - 129 U/L    ALT 13 10 - 40 U/L    AST 15 15 - 37 U/L   Acetaminophen Level   Result Value Ref Range    Acetaminophen Level <5 (L) 10 - 30 ug/mL   Salicylate   Result Value Ref Range    Salicylate, Serum <3.5 (L) 15.0 - 30.0 mg/dL   Ethanol   Result Value Ref Range    Ethanol Lvl None Detected mg/dL   hCG, serum, qualitative   Result Value Ref Range    hCG Qual Negative Detects HCG level >10 MIU/mL   Drug screen multi urine   Result Value Ref Range    Amphetamine Screen, Urine Neg Negative <1000ng/mL    Barbiturate Screen, Ur Neg Negative <200 ng/mL    Benzodiazepine Screen, Urine Neg Negative <200 ng/mL    Cannabinoid Scrn, Ur POSITIVE (A) Negative <50 ng/mL    Cocaine Metabolite Screen, Urine Neg Negative <300 ng/mL    Opiate Scrn, Ur Neg Negative <300 ng/mL    PCP Screen, Urine Neg Negative <25 ng/mL    Methadone Screen, Urine Neg Negative <300 ng/mL    Propoxyphene Scrn, Ur Neg Negative <300 ng/mL    Oxycodone Urine Neg Negative <100 ng/ml    pH, UA 5.0     Drug Screen Comment: see below            ED COURSE / MDM  Patient seen and evaluated. Old records reviewed. Labs and imaging reviewed and results discussed with patient.       Overall, depressed appearing patient in no acute distress, presenting for suicidal ideation and self harming behavior. Patient denies somatic complaints. Physical exam remarkable for numerous superficial lacerations to all extremities and abdomen, no apparent repair. Laboratory studies obtained for medical clearance. Work-up showed:    ED Course as of 08/30/22 2316   Tue Aug 30, 2022   1516 COVID and flu swab negative [ER]   0582 Ethanol, salicylate, and acetaminophen levels negative [ER]   1517 Patient is not pregnant [ER]   1517 No electrolyte abnormalities or evidence of kidney dysfunction. [ER]   1517 Liver function testing unremarkable [ER]   1517 No leukocytosis, anemia, thrombocytopenia [ER]   1517 Urine drug screen positive for cannabinoids, [ER]   1517 Patient is medically clear for psychiatric evaluation [ER]      ED Course User Index  [ER] Doug Roman MD     Is this patient to be included in the SEP-1 Core Measure due to severe sepsis or septic shock? No   Exclusion criteria - the patient is NOT to be included for SEP-1 Core Measure due to:  2+ SIRS criteria are not met      During the patient's ED course, the patient was given:  Medications   tetanus & diphtheria toxoids (adult) 5-2 LFU injection 0.5 mL (0.5 mLs IntraMUSCular Not Given 8/30/22 1636)      I have performed a medical clearance examination on this patient. It is my opinion that no medical conditions were discovered that would preclude admission to a behavioral health unit or discharge home. I feel that the patient is medically stable for disposition by the behavioral health team at this time. Psychiatry social work evaluated the patient and staffed the case with on-call psychiatry team. Decision made to admit the patient. CLINICAL IMPRESSION  1. Suicidal ideation    2. Deliberate self-cutting        Blood pressure 98/72, pulse 68, temperature 97.9 °F (36.6 °C), resp.  rate 16, height 5' 6\" (1.676 m), weight 109 lb (49.4 kg), last menstrual period 08/25/2022, SpO2 99 %, currently breastfeeding. DISPOSITION  Austin Sweeney was admitted in stable condition. DISCLAIMER: This chart was created using Dragon dictation software. Efforts were made by me to ensure accuracy, however some errors may be present due to limitations of this technology and occasionally words are not transcribed correctly.        Anuja Kirkland MD  08/30/22 2027

## 2022-08-30 NOTE — ED NOTES
Patient given turkey box lunch. Reports she will allow this nurse to obtain blood and covid swab after she eats.       Yasmine Vargas RN  08/30/22 1400
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
## 2022-08-30 NOTE — ED NOTES
Report called to Duong Miller. Security called to take patient to [facility].       Jacquie Don RN  08/30/22 7397

## 2022-08-30 NOTE — ED NOTES
Patient brought back from triage. Patient changed into safety gown and oriented to YOLANDA. Belongings locked into locker. Will continue to monitor patient.       Sandy Small RN  08/30/22 1412

## 2022-08-30 NOTE — ED NOTES
Level of Care Disposition:  admit      Patient was seen by ED provider and [facility] staff. The case presented to psychiatric provider on-call Rob GARCIA CNP. Based on the ED evaluation and information presented to the provider by this nurse it is the recommendation of the on call psychiatric provider that inpatient hospitalization is the least restrictive environment for the patient at this time. The patient will be admitted to the inpatient unit.       Arnulfo Adkins RN  08/30/22 6414

## 2022-08-31 ENCOUNTER — APPOINTMENT (OUTPATIENT)
Dept: GENERAL RADIOLOGY | Age: 32
DRG: 751 | End: 2022-08-31
Payer: COMMERCIAL

## 2022-08-31 PROBLEM — F32.2 CURRENT SEVERE EPISODE OF MAJOR DEPRESSIVE DISORDER WITHOUT PSYCHOTIC FEATURES (HCC): Status: ACTIVE | Noted: 2022-08-31

## 2022-08-31 LAB
CHOLESTEROL, TOTAL: 135 MG/DL (ref 0–199)
ESTIMATED AVERAGE GLUCOSE: 85.3 MG/DL
HBA1C MFR BLD: 4.6 %
HDLC SERPL-MCNC: 53 MG/DL (ref 40–60)
LDL CHOLESTEROL CALCULATED: 69 MG/DL
TRIGL SERPL-MCNC: 66 MG/DL (ref 0–150)
VLDLC SERPL CALC-MCNC: 13 MG/DL

## 2022-08-31 PROCEDURE — 1240000000 HC EMOTIONAL WELLNESS R&B

## 2022-08-31 PROCEDURE — 6370000000 HC RX 637 (ALT 250 FOR IP)

## 2022-08-31 PROCEDURE — 6370000000 HC RX 637 (ALT 250 FOR IP): Performed by: PSYCHIATRY & NEUROLOGY

## 2022-08-31 PROCEDURE — 71045 X-RAY EXAM CHEST 1 VIEW: CPT

## 2022-08-31 PROCEDURE — 99221 1ST HOSP IP/OBS SF/LOW 40: CPT

## 2022-08-31 RX ORDER — NICOTINE 21 MG/24HR
1 PATCH, TRANSDERMAL 24 HOURS TRANSDERMAL DAILY
Status: DISCONTINUED | OUTPATIENT
Start: 2022-08-31 | End: 2022-09-04 | Stop reason: HOSPADM

## 2022-08-31 RX ORDER — GUAIFENESIN 600 MG/1
600 TABLET, EXTENDED RELEASE ORAL 2 TIMES DAILY
Status: DISCONTINUED | OUTPATIENT
Start: 2022-08-31 | End: 2022-09-04 | Stop reason: HOSPADM

## 2022-08-31 RX ORDER — BENZONATATE 100 MG/1
100 CAPSULE ORAL 3 TIMES DAILY PRN
Status: DISCONTINUED | OUTPATIENT
Start: 2022-08-31 | End: 2022-09-04 | Stop reason: HOSPADM

## 2022-08-31 RX ORDER — ALBUTEROL SULFATE 90 UG/1
2 AEROSOL, METERED RESPIRATORY (INHALATION) EVERY 6 HOURS PRN
Status: DISCONTINUED | OUTPATIENT
Start: 2022-08-31 | End: 2022-09-04 | Stop reason: HOSPADM

## 2022-08-31 RX ORDER — ARIPIPRAZOLE 10 MG/1
5 TABLET ORAL DAILY
Status: DISCONTINUED | OUTPATIENT
Start: 2022-08-31 | End: 2022-09-04 | Stop reason: HOSPADM

## 2022-08-31 RX ORDER — HYDROXYZINE PAMOATE 50 MG/1
50 CAPSULE ORAL EVERY 6 HOURS PRN
Status: DISCONTINUED | OUTPATIENT
Start: 2022-08-31 | End: 2022-08-31 | Stop reason: CLARIF

## 2022-08-31 RX ORDER — QUETIAPINE FUMARATE 25 MG/1
25 TABLET, FILM COATED ORAL EVERY 6 HOURS PRN
Status: DISCONTINUED | OUTPATIENT
Start: 2022-08-31 | End: 2022-09-04 | Stop reason: HOSPADM

## 2022-08-31 RX ORDER — HYDROXYZINE HYDROCHLORIDE 25 MG/1
50 TABLET, FILM COATED ORAL 3 TIMES DAILY PRN
Status: DISCONTINUED | OUTPATIENT
Start: 2022-08-31 | End: 2022-08-31

## 2022-08-31 RX ORDER — OLANZAPINE 10 MG/1
10 TABLET ORAL EVERY 6 HOURS PRN
Status: DISCONTINUED | OUTPATIENT
Start: 2022-08-31 | End: 2022-09-04 | Stop reason: HOSPADM

## 2022-08-31 RX ORDER — HYDROXYZINE 50 MG/1
50 TABLET, FILM COATED ORAL EVERY 6 HOURS PRN
Status: DISCONTINUED | OUTPATIENT
Start: 2022-08-31 | End: 2022-08-31

## 2022-08-31 RX ADMIN — GUAIFENESIN 600 MG: 600 TABLET, EXTENDED RELEASE ORAL at 16:13

## 2022-08-31 RX ADMIN — GUAIFENESIN 600 MG: 600 TABLET, EXTENDED RELEASE ORAL at 22:00

## 2022-08-31 RX ADMIN — QUETIAPINE FUMARATE 25 MG: 25 TABLET ORAL at 16:13

## 2022-08-31 RX ADMIN — ARIPIPRAZOLE 5 MG: 10 TABLET ORAL at 12:11

## 2022-08-31 RX ADMIN — QUETIAPINE FUMARATE 25 MG: 25 TABLET ORAL at 23:38

## 2022-08-31 ASSESSMENT — SLEEP AND FATIGUE QUESTIONNAIRES
SLEEP PATTERN: DIFFICULTY FALLING ASLEEP;DISTURBED/INTERRUPTED SLEEP;NIGHTMARES/TERRORS
AVERAGE NUMBER OF SLEEP HOURS: 3
DO YOU USE A SLEEP AID: YES
DO YOU HAVE DIFFICULTY SLEEPING: YES

## 2022-08-31 ASSESSMENT — PATIENT HEALTH QUESTIONNAIRE - PHQ9: SUM OF ALL RESPONSES TO PHQ QUESTIONS 1-9: 24
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
## 2022-08-31 NOTE — PROGRESS NOTES
Behavioral Services  Medicare Certification Upon Admission    I certify that this patient's inpatient psychiatric hospital admission is medically necessary for:    [x] (1) Treatment which could reasonably be expected to improve this patient's condition,       [x] (2) Or for diagnostic study;     AND     [x](2) The inpatient psychiatric services are provided while the individual is under the care of a physician and are included in the individualized plan of care.     Estimated length of stay/service 3 d    Plan for post-hospital care outpt    Electronically signed by Collins Wallace MD on 8/31/2022 at 11:18 AM

## 2022-08-31 NOTE — GROUP NOTE
Group Therapy Note    Date: 8/31/2022    Group Start Time: 1000  Group End Time: 1050  Group Topic: Psychoeducation    [facility],[address], [place]        Group Therapy Note    Attendees: 7    Participants learned about gratitude and practiced gratitude by creating a gratitude letter to someone they appreciate and why. Notes:  Noni Welch attended group and was engaged in learning about gratitude. Noni Welch also participated in the group activity of writing a letter of gratitude to someone she appreciates. Noni Welch was pleasant to have in group.      Status After Intervention:  Improved    Participation Level: Interactive    Participation Quality: Appropriate and Attentive      Speech:  normal      Thought Process/Content: Logical      Affective Functioning: Congruent      Mood: euthymic      Level of consciousness:  Alert, Oriented x4, and Attentive      Response to Learning: Able to verbalize/acknowledge new learning and Progressing to goal      Endings: None Reported    Modes of Intervention: Education and Activity      Discipline Responsible: /Counselor      Signature:  32 Bree Shirley, MICHELLE

## 2022-08-31 NOTE — GROUP NOTE
Group Therapy Note    Date: 8/31/2022    Group Start Time: 1100  Group End Time: 1200  Group Topic: Psychoeducation    [facility]        Group Therapy Note    Topic: Mindfulness & Grounding Techniques and Practice    Group facilitator led education discussion of current grounding practices of group members. Facilitator provided education in 3 categories: physical, mental, and soothing grounding techniques, assisted patients in practice of these separate categories of grounding techniques. Attendees: 9       Notes:  Burt Dinero was present and attentive during session, did not engage in verbal process. Status After Intervention:  Improved    Participation Level:  Active Listener    Participation Quality: Attentive      Speech:  mute      Thought Process/Content: Linear      Affective Functioning: Congruent      Mood: depressed      Level of consciousness:  Attentive      Response to Learning: Progressing to goal      Endings: None Reported    Modes of Intervention: Education, Support, Socialization, Exploration, and Activity      Discipline Responsible: Psychoeducational Specialist      Signature:  Ruddy Bamberger, MM, MT-BC

## 2022-08-31 NOTE — PROGRESS NOTES
RT Nebulizer Bronchodilator Protocol Note    There is a bronchodilator order in the chart from a provider indicating to follow the RT Bronchodilator Protocol and there is an Initiate RT Bronchodilator Protocol order as well (see protocol at bottom of note). CXR Findings:  XR CHEST PORTABLE    Result Date: 8/31/2022  No acute cardiopulmonary findings       The findings from the last RT Protocol Assessment were as follows:  Smoking: Chronic pulmonary disease  Respiratory Pattern: Regular pattern and RR 12-20 bpm  Breath Sounds: Intermittent or unilateral wheezes  Cough: Strong, spontaneous, non-productive  Indication for Bronchodilator Therapy: Decreased or absent breath sounds  Bronchodilator Assessment Score: 6    Aerosolized bronchodilator medication orders have been revised according to the RT Nebulizer Bronchodilator Protocol below. Respiratory Therapist to perform RT Therapy Protocol Assessment initially then follow the protocol. Repeat RT Therapy Protocol Assessment PRN for score 0-3 or on second treatment, BID, and PRN for scores above 3. No Indications - adjust the frequency to every 6 hours PRN wheezing or bronchospasm, if no treatments needed after 48 hours then discontinue using Per Protocol order mode. If indication present, adjust the RT bronchodilator orders based on the Bronchodilator Assessment Score as indicated below. If a patient is on this medication at home then do not decrease Frequency below that used at home. 0-3 - enter or revise RT bronchodilator order(s) to equivalent RT Bronchodilator order with Frequency of every 4 hours PRN for wheezing or increased work of breathing using Per Protocol order mode. 4-6 - enter or revise RT Bronchodilator order(s) to two equivalent RT bronchodilator orders with one order with BID Frequency and one order with Frequency of every 4 hours PRN wheezing or increased work of breathing using Per Protocol order mode.          7-10 - enter or revise RT Bronchodilator order(s) to two equivalent RT bronchodilator orders with one order with TID Frequency and one order with Frequency of every 4 hours PRN wheezing or increased work of breathing using Per Protocol order mode. 11-13 - enter or revise RT Bronchodilator order(s) to one equivalent RT bronchodilator order with QID Frequency and an Albuterol order with Frequency of every 4 hours PRN wheezing or increased work of breathing using Per Protocol order mode. Greater than 13 - enter or revise RT Bronchodilator order(s) to one equivalent RT bronchodilator order with every 4 hours Frequency and an Albuterol order with Frequency of every 2 hours PRN wheezing or increased work of breathing using Per Protocol order mode. RT to enter RT Home Evaluation for COPD & MDI Assessment order using Per Protocol order mode.     Electronically signed by Mary Alcala RCP on 8/31/2022 at 5:08 PM
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
## 2022-08-31 NOTE — GROUP NOTE
Group Therapy Note    Date: 8/31/2022    Group Start Time: 1300  Group End Time: 5661  Group Topic: Cognitive Skills    [id number] [facility]        Group Therapy Note    Attendees: 10    Group members traced their hands and wrote a compliment on each others hand. The goal was to increase self-esteem through positive affirmations. When finished, group members completed \"A-Z Coping Skills. \"     Notes:  Tiffany Pfeiffer attended group for part of the time due to meeting with a member of her treatment team. During her time in group, Tiffany Pfeiffer remained engaged and interacted approrpiately with other members during the group. Status After Intervention:  Improved    Participation Level:  Active Listener and Interactive    Participation Quality: Appropriate, Attentive, and Sharing      Speech:  normal      Thought Process/Content: Logical      Affective Functioning: Congruent      Mood: euthymic      Level of consciousness:  Alert      Response to Learning: Able to verbalize current knowledge/experience      Endings: None Reported    Modes of Intervention: Socialization, Exploration, and Activity      Discipline Responsible: Behavorial Health Tech      Signature:  NILDA Pina

## 2022-08-31 NOTE — PLAN OF CARE
Problem: Self Harm/Suicidality  Goal: Will have no self-injury during hospital stay  Description: INTERVENTIONS:  1. Q 30 MINUTES: Routine safety checks  2. Q SHIFT & PRN: Assess risk to determine if routine checks are adequate to maintain patient safety  Outcome: Progressing     Problem: Depression/Self Harm  Goal: Effect of psychiatric condition will be minimized and patient will be protected from self harm  Description: INTERVENTIONS:  1. Assess impact of patient's symptoms on level of functioning, self care needs and offer support as indicated  2. Assess patient/family knowledge of depression, impact on illness and need for teaching  3. Provide emotional support, presence and reassurance  4. Assess for possible suicidal thoughts or ideation. If patient expresses suicidal thoughts or statements do not leave alone, initiate Suicide Precautions, move to a room close to the nursing station and obtain sitter  5. Initiate consults as appropriate with Mental Health Professional, Spiritual Care, Psychosocial CNS, and consider a recommendation to the LIP for a Psychiatric Consultation  Outcome: Progressing  Flowsheets (Taken 8/31/2022 1503)  Effect of psychiatric condition will be minimized and patient will be protected from self harm: Assess impact of patients symptoms on level of functioning, self care needs and offer support as indicated    Pt took AM med c/o feeling agitated med's Zyprexa and atarax  does not work . Pt also requested a nicotine patch new orders received . see Epic  pt now in day room visiting with family pt reports feels better after visit.

## 2022-08-31 NOTE — PLAN OF CARE
Problem: Self Harm/Suicidality  Goal: Will have no self-injury during hospital stay  Description: INTERVENTIONS:  1. Q 30 MINUTES: Routine safety checks  2. Q SHIFT & PRN: Assess risk to determine if routine checks are adequate to maintain patient safety  Outcome: Progressing     Problem: Depression/Self Harm  Goal: Effect of psychiatric condition will be minimized and patient will be protected from self harm  Description: INTERVENTIONS:  1. Assess impact of patient's symptoms on level of functioning, self care needs and offer support as indicated  2. Assess patient/family knowledge of depression, impact on illness and need for teaching  3. Provide emotional support, presence and reassurance  4. Assess for possible suicidal thoughts or ideation. If patient expresses suicidal thoughts or statements do not leave alone, initiate Suicide Precautions, move to a room close to the nursing station and obtain sitter  5. Initiate consults as appropriate with Mental Health Professional, Spiritual Care, Psychosocial CNS, and consider a recommendation to the LIP for a Psychiatric Consultation  Outcome: Not Progressing  Flowsheets (Taken 8/30/2022 1844 by Francella Sandifer, RN)  Effect of psychiatric condition will be minimized and patient will be protected from self harm:   Assess impact of patients symptoms on level of functioning, self care needs and offer support as indicated   Assess patient/family knowledge of depression, impact on illness and need for teaching   Provide emotional support, presence and reassurance   Assess for suicidal thoughts or ideation.  If patient expresses suicidal thoughts or statements do not leave alone, initiate Suicide Precautions, move near nurse station, obtain sitter   Initiate consults as appropriate with Mental Health Professional, Spiritual Care, Psychosocial CNS, and consider a recommendation to the LIP for a Psychiatric Consultation     Problem: Depression/Self Harm  Goal: Effect of psychiatric condition will be minimized and patient will be protected from self harm  Description: INTERVENTIONS:  1. Assess impact of patient's symptoms on level of functioning, self care needs and offer support as indicated  2. Assess patient/family knowledge of depression, impact on illness and need for teaching  3. Provide emotional support, presence and reassurance  4. Assess for possible suicidal thoughts or ideation. If patient expresses suicidal thoughts or statements do not leave alone, initiate Suicide Precautions, move to a room close to the nursing station and obtain sitter  5. Initiate consults as appropriate with Mental Health Professional, Spiritual Care, Psychosocial CNS, and consider a recommendation to the LIP for a Psychiatric Consultation  Outcome: Not Progressing  Flowsheets (Taken 8/30/2022 1844 by Kaylie Mckinney RN)  Effect of psychiatric condition will be minimized and patient will be protected from self harm:   Assess impact of patients symptoms on level of functioning, self care needs and offer support as indicated   Assess patient/family knowledge of depression, impact on illness and need for teaching   Provide emotional support, presence and reassurance   Assess for suicidal thoughts or ideation. If patient expresses suicidal thoughts or statements do not leave alone, initiate Suicide Precautions, move near nurse station, obtain sitter   Initiate consults as appropriate with Mental Health Professional, Spiritual Care, Psychosocial CNS, and consider a recommendation to the LIP for a Psychiatric Consultation   Pt has been social and visible this evening. States she is having increased anxiety, but atarax is not effective. Offered benedryl per provider and pt refused. Also refused trazodone. Agreed to zyprexa. CFS, denies all.

## 2022-08-31 NOTE — PROGRESS NOTES
Completed admission with pt. Pt received atarax around 2000 and was chatting with peers in the day room until 2100 when she came to the team station and stated that her anxiety was worse and the medication was not helping her. Asked pt what has typically helped with anxiety and pt was not sure. Did not see a history of antianxiety meds from previous stay. Checked with on call provider who ordered one time dose of oral benedryl. Pt declined this stating \"Is that a joke? Benedryl won't work. \" Offered trazodone for sleep and pt declined that as well. Pt appeared agitated, offered zyprexa, which she accepted. Pt states that she typically uses marijuana and this helps her sleep. Encouraged to speak with provider tomorrow about her anxiety and sleep needs to see what else we can try. Pt verbalized understanding.
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
## 2022-09-01 LAB — S PYO AG THROAT QL: NEGATIVE

## 2022-09-01 PROCEDURE — 6370000000 HC RX 637 (ALT 250 FOR IP): Performed by: PSYCHIATRY & NEUROLOGY

## 2022-09-01 PROCEDURE — 87081 CULTURE SCREEN ONLY: CPT

## 2022-09-01 PROCEDURE — 1240000000 HC EMOTIONAL WELLNESS R&B

## 2022-09-01 PROCEDURE — 87880 STREP A ASSAY W/OPTIC: CPT

## 2022-09-01 RX ORDER — ONDANSETRON 4 MG/1
4 TABLET, ORALLY DISINTEGRATING ORAL EVERY 8 HOURS PRN
Status: DISCONTINUED | OUTPATIENT
Start: 2022-09-01 | End: 2022-09-04 | Stop reason: HOSPADM

## 2022-09-01 RX ORDER — OLANZAPINE 5 MG/1
5 TABLET, ORALLY DISINTEGRATING ORAL ONCE
Status: COMPLETED | OUTPATIENT
Start: 2022-09-01 | End: 2022-09-02

## 2022-09-01 RX ORDER — ONDANSETRON 2 MG/ML
4 INJECTION INTRAMUSCULAR; INTRAVENOUS EVERY 6 HOURS PRN
Status: DISCONTINUED | OUTPATIENT
Start: 2022-09-01 | End: 2022-09-01

## 2022-09-01 RX ORDER — BUSPIRONE HYDROCHLORIDE 10 MG/1
10 TABLET ORAL 3 TIMES DAILY
Status: DISCONTINUED | OUTPATIENT
Start: 2022-09-01 | End: 2022-09-04 | Stop reason: HOSPADM

## 2022-09-01 RX ORDER — IBUPROFEN 400 MG/1
400 TABLET ORAL EVERY 6 HOURS PRN
Status: DISCONTINUED | OUTPATIENT
Start: 2022-09-01 | End: 2022-09-04 | Stop reason: HOSPADM

## 2022-09-01 RX ADMIN — BUSPIRONE HYDROCHLORIDE 10 MG: 10 TABLET ORAL at 22:09

## 2022-09-01 RX ADMIN — BUSPIRONE HYDROCHLORIDE 10 MG: 10 TABLET ORAL at 14:46

## 2022-09-01 NOTE — PROGRESS NOTES
Group Therapy Note    Date: 9/1/2022  Start Time: 1300  End Time:  1400  Number of Participants: 7    Type of Group: Music Group    Notes:  Pt present for Music Group. Pt actively participated by making song selections and singing along to music. Participation Level:  Active Listener and Interactive    Participation Quality: Appropriate and Attentive      Speech:  normal      Affective Functioning: Congruent      Endings: None Reported    Modes of Intervention: Support, Socialization, Activity, and Media      Discipline Responsible: Chaplain Kaye Peña       09/01/22 1525   Encounter Summary   Encounter Overview/Reason  Behavioral Health   Last Encounter    (9/1 Music Group)   Complexity of Encounter Moderate   Begin Time 1300   End Time  1400   Total Time Calculated 60 min   Behavioral Health    Type  Spirituality Group

## 2022-09-01 NOTE — PROGRESS NOTES
09/01/22 1442   Encounter Summary   Encounter Overview/Reason  Initial Encounter;Spiritual/Emotional Needs; Behavioral Health   Service Provided For: Patient   Referral/Consult From: Patient   Last Encounter    (9/1 initial, printed song lyrics for Pt)   Complexity of Encounter Moderate   Begin Time 1430   End Time  1435   Total Time Calculated 5 min

## 2022-09-01 NOTE — GROUP NOTE
Group Therapy Note    Date: 8/31/2022    Group Start Time: 2010  Group End Time: 2035  Group Topic: Wrap-Up    [facility]        Group Therapy Note    Attendees: Goals and importance of goal setting discussed. Night time milieu activities discussed. Patient's Goal:  practice gratitude    Notes:  successful     Status After Intervention:  Improved    Participation Level:  Active Listener and Interactive    Participation Quality: Appropriate and Attentive      Speech:  normal      Thought Process/Content: Logical  Linear      Affective Functioning: Congruent      Mood: euthymic      Level of consciousness:  Alert and Oriented x4      Response to Learning: Progressing to goal      Endings: None Reported    Modes of Intervention: Support      Discipline Responsible: Behavorial Health Tech      Signature:  Barbara Whitehead

## 2022-09-01 NOTE — PROGRESS NOTES
Patient would like me to record that she feels that the Seroquel has been oversedating. She is hoping that the doctor will order something else. She will ask tomorrow. I explained that I will also put a note for day shift to be aware.

## 2022-09-01 NOTE — PLAN OF CARE
Problem: Self Harm/Suicidality  Goal: Will have no self-injury during hospital stay  Description: INTERVENTIONS:  1. Q 30 MINUTES: Routine safety checks  2. Q SHIFT & PRN: Assess risk to determine if routine checks are adequate to maintain patient safety  Outcome: Progressing     Problem: Depression/Self Harm  Goal: Effect of psychiatric condition will be minimized and patient will be protected from self harm  Description: INTERVENTIONS:  1. Assess impact of patient's symptoms on level of functioning, self care needs and offer support as indicated  2. Assess patient/family knowledge of depression, impact on illness and need for teaching  3. Provide emotional support, presence and reassurance  4. Assess for possible suicidal thoughts or ideation. If patient expresses suicidal thoughts or statements do not leave alone, initiate Suicide Precautions, move to a room close to the nursing station and obtain sitter  5.  Initiate consults as appropriate with Mental Health Professional, Spiritual Care, Psychosocial CNS, and consider a recommendation to the LIP for a Psychiatric Consultation  Outcome: Progressing

## 2022-09-01 NOTE — PROGRESS NOTES
Patient said that she needs an IV. Explained that she just started throwing up over the last three hours so we will try the antiemetic first and see if the vomiting can be decreased or extinguished.

## 2022-09-01 NOTE — PROGRESS NOTES
Patient requested and received seroquel 25 mg po for anxiety regarding inability to sleep. Will monitor effectiveness.

## 2022-09-01 NOTE — PLAN OF CARE
Problem: Self Harm/Suicidality  Goal: Will have no self-injury during hospital stay  Description: INTERVENTIONS:  1. Q 30 MINUTES: Routine safety checks  2. Q SHIFT & PRN: Assess risk to determine if routine checks are adequate to maintain patient safety  8/31/2022 2112 by Shayy Saenz RN  Outcome: Progressing     Problem: Depression/Self Harm  Goal: Effect of psychiatric condition will be minimized and patient will be protected from self harm  Description: INTERVENTIONS:  1. Assess impact of patient's symptoms on level of functioning, self care needs and offer support as indicated  2. Assess patient/family knowledge of depression, impact on illness and need for teaching  3. Provide emotional support, presence and reassurance  4. Assess for possible suicidal thoughts or ideation. If patient expresses suicidal thoughts or statements do not leave alone, initiate Suicide Precautions, move to a room close to the nursing station and obtain sitter  5. Initiate consults as appropriate with Mental Health Professional, Spiritual Care, Psychosocial CNS, and consider a recommendation to the [facility] for a Psychiatric Consultation  8/31/2022 2112 by Shayy Saenz RN  Outcome: Progressing  Flowsheets (Taken 8/31/2022 1618 by Elesa Gottron, RN)  Effect of psychiatric condition will be minimized and patient will be protected from self harm: Assess impact of patients symptoms on level of functioning, self care needs and offer support as indicated   Patient denied SI/HI,A/V hallucinations. She is social with peers and noted to be in the dayroom majority of evening before going to room. She denied any complaint of pain. She attended groups and ate snack this evening. She feels safe on the unit. She is asked to come to the staff if thoughts of self harm were to occur. She agrees to do this. Safety checks continue Q 15 minutes through out the shift.

## 2022-09-01 NOTE — PROGRESS NOTES
Patient has had another episode of emesis. Offered zofran IM, patient refused said that she will take sublingual. Awaiting for medication to be verified/approved by pharmacy.

## 2022-09-01 NOTE — PROGRESS NOTES
Department of Psychiatry  AttendingProgress Note  Chief Complaint: depression  [employer]   stated that she had a  bad night with nausea and vomiting . She  has anxiety that she stated isn't better and she was asking for more benzos which I declined. We discussed  adding BUspar to her regimen. She also is feeling ill and Elvira Bates saw her today and she has pulmonary sxs. Lab work had been stable but rechecking . Continues to feel depressed and SI. Patient's chart was reviewed and collaborated with  about the treatment plan. SUBJECTIVE:    Patient is feeling worse. Suicidal ideation:  passive. Patient does not have medication side effects. ROS: Patient has new complaints: no  Sleeping adequately:  No:    Appetite adequate: No:   Attending groups: Yes  Visitors:No    OBJECTIVE    Physical  VITALS:  BP (!) 92/59   Pulse 86   Temp 98.4 °F (36.9 °C) (Infrared)   Resp 16   Ht 5' 6\" (1.676 m)   Wt 109 lb (49.4 kg)   LMP 08/25/2022   SpO2 97%   BMI 17.59 kg/m²     Mental Status Examination:  Patients appearance was ill-appearing. Thoughts are Goal directed. Homicidal ideations none. No abnormal movements, tics or mannerisms. Memory intact Aims 0. Concentration Fair. Alert and oriented X 4. Insight and Judgement impaired insight. Patient was cooperative.  Patient gait normal. Mood constricted, decreased range, and depressed, affect anxiety Hallucinations Absent, suicidal ideations no specific plan to harm self Speech normal volume  Data  Labs:   Admission on 08/30/2022   Component Date Value Ref Range Status    WBC 08/30/2022 6.3  4.0 - 11.0 K/uL Final    RBC 08/30/2022 4.06  4.00 - 5.20 M/uL Final    Hemoglobin 08/30/2022 13.6  12.0 - 16.0 g/dL Final    Hematocrit 08/30/2022 39.6  36.0 - 48.0 % Final    MCV 08/30/2022 97.4  80.0 - 100.0 fL Final    MCH 08/30/2022 33.5  26.0 - 34.0 pg Final    MCHC 08/30/2022 34.4  31.0 - 36.0 g/dL Final    RDW 08/30/2022 12.5  12.4 - 15.4 % Final Platelets 72/59/0149 201  135 - 450 K/uL Final    MPV 08/30/2022 7.5  5.0 - 10.5 fL Final    Neutrophils % 08/30/2022 52.8  % Final    Lymphocytes % 08/30/2022 37.4  % Final    Monocytes % 08/30/2022 6.0  % Final    Eosinophils % 08/30/2022 3.1  % Final    Basophils % 08/30/2022 0.7  % Final    Neutrophils Absolute 08/30/2022 3.3  1.7 - 7.7 K/uL Final    Lymphocytes Absolute 08/30/2022 2.3  1.0 - 5.1 K/uL Final    Monocytes Absolute 08/30/2022 0.4  0.0 - 1.3 K/uL Final    Eosinophils Absolute 08/30/2022 0.2  0.0 - 0.6 K/uL Final    Basophils Absolute 08/30/2022 0.0  0.0 - 0.2 K/uL Final    Sodium 08/30/2022 139  136 - 145 mmol/L Final    Potassium reflex Magnesium 08/30/2022 4.4  3.5 - 5.1 mmol/L Final    Chloride 08/30/2022 104  99 - 110 mmol/L Final    CO2 08/30/2022 26  21 - 32 mmol/L Final    Anion Gap 08/30/2022 9  3 - 16 Final    Glucose 08/30/2022 100 (A) 70 - 99 mg/dL Final    BUN 08/30/2022 11  7 - 20 mg/dL Final    Creatinine 08/30/2022 0.9  0.6 - 1.1 mg/dL Final    GFR Non- 08/30/2022 >60  >60 Final    Comment: >60 mL/min/1.73m2 EGFR, calc. for ages 25 and older using the  MDRD formula (not corrected for weight), is valid for stable  renal function. GFR  08/30/2022 >60  >60 Final    Comment: Chronic Kidney Disease: less than 60 ml/min/1.73 sq.m. Kidney Failure: less than 15 ml/min/1.73 sq.m. Results valid for patients 18 years and older.       Calcium 08/30/2022 9.1  8.3 - 10.6 mg/dL Final    Total Protein 08/30/2022 7.3  6.4 - 8.2 g/dL Final    Albumin 08/30/2022 4.8  3.4 - 5.0 g/dL Final    Albumin/Globulin Ratio 08/30/2022 1.9  1.1 - 2.2 Final    Total Bilirubin 08/30/2022 0.6  0.0 - 1.0 mg/dL Final    Alkaline Phosphatase 08/30/2022 46  40 - 129 U/L Final    ALT 08/30/2022 13  10 - 40 U/L Final    AST 08/30/2022 15  15 - 37 U/L Final    Acetaminophen Level 08/30/2022 <5 (A) 10 - 30 ug/mL Final    Comment: Therapeutic Range: 10.0-30.0 ug/mL  Toxic: >=150 ug/mL      Salicylate, Serum 08/13/0642 <0.3 (A) 15.0 - 30.0 mg/dL Final    Comment: Therapeutic Range: 15.0-30.0 mg/dL  Toxic: >30.0 mg/dL      Ethanol Lvl 08/30/2022 None Detected  mg/dL Final    Comment:    None Detected  Conversion factor:  100 mg/dl = .100 g/dl  For Medical Purposes Only      hCG Qual 08/30/2022 Negative  Detects HCG level >10 MIU/mL Final    Amphetamine Screen, Urine 08/30/2022 Neg  Negative <1000ng/mL Final    Barbiturate Screen, Ur 08/30/2022 Neg  Negative <200 ng/mL Final    Benzodiazepine Screen, Urine 08/30/2022 Neg  Negative <200 ng/mL Final    Cannabinoid Scrn, Ur 08/30/2022 POSITIVE (A) Negative <50 ng/mL Final    Cocaine Metabolite Screen, Urine 08/30/2022 Neg  Negative <300 ng/mL Final    Opiate Scrn, Ur 08/30/2022 Neg  Negative <300 ng/mL Final    Comment: \"Therapeutic levels of pain medication, especially oxycontin and synthetic  opioids, may not be detected by this Methodology. Pain management screen  panel  Drug panel-PM-Hi Res Ur, Interp (PAIN) should be considered for drug  monitoring \". PCP Screen, Urine 08/30/2022 Neg  Negative <25 ng/mL Final    Methadone Screen, Urine 08/30/2022 Neg  Negative <300 ng/mL Final    Propoxyphene Scrn, Ur 08/30/2022 Neg  Negative <300 ng/mL Final    Oxycodone Urine 08/30/2022 Neg  Negative <100 ng/ml Final    pH, UA 08/30/2022 5.0   Final    Comment: Urine pH less than 5.0 or greater than 8.0 may indicate sample adulteration. Another sample should be collected if clinically  indicated. Drug Screen Comment: 08/30/2022 see below   Final    Comment: This method is a screening test to detect only these drug  classes as part of a medical workup. Confirmatory testing  by another method should be ordered if clinically indicated.       SARS-CoV-2 RNA, RT PCR 08/30/2022 NOT DETECTED  NOT DETECTED Final    Comment: Not Detected results do not preclude SARS-CoV-2 infection and  should not be used as the sole basis for patient management  decisions. Results must be combined with clinical observations,  patient history, and epidemiological information. Testing was performed using [employer] SARS-CoV-2 and Influenza A/B  nucleic acid assay. This test is a multiplex Real-Time Reverse  Transcriptase Polymerase Chain Reaction (RT-PCR)-based in vitro  diagnostic test intended for the qualitative detection of nucleic  acids from SARS-CoV-2, influenza A, and influenza B in nasopharyngeal  and nasal swab specimens for use under the FDAs Emergency Use  Authorization (EUA) only.     Patient Fact Sheet:  FindDrives.pl  Provider Fact Sheet: FindDrives.pl  EUA: FindDrives.pl  IFU: FindDrives.pl    Methodology:  RT-PCR      INFLUENZA A 08/30/2022 NOT DETECTED  NOT DETECTED Final    INFLUENZA B 08/30/2022 NOT DETECTED  NOT DETECTED Final    TSH 08/30/2022 0.71  0.27 - 4.20 uIU/mL Final    Cholesterol, Total 08/30/2022 135  0 - 199 mg/dL Final    Triglycerides 08/30/2022 66  0 - 150 mg/dL Final    HDL 08/30/2022 53  40 - 60 mg/dL Final    LDL Calculated 08/30/2022 69  <100 mg/dL Final    VLDL Cholesterol Calculated 08/30/2022 13  Not Established mg/dL Final    Hemoglobin A1C 08/30/2022 4.6  See comment % Final    Comment: Comment:  Diagnosis of Diabetes: > or = 6.5%  Increased risk of diabetes (Prediabetes): 5.7-6.4%  Glycemic Control: Nonpregnant Adults: <7.0%                    Pregnant: <6.0%        eAG 08/30/2022 85.3  mg/dL Final            Medications  Current Facility-Administered Medications: ondansetron (ZOFRAN-ODT) disintegrating tablet 4 mg, 4 mg, Oral, Q8H PRN  busPIRone (BUSPAR) tablet 10 mg, 10 mg, Oral, TID  ibuprofen (ADVIL;MOTRIN) tablet 400 mg, 400 mg, Oral, Q6H PRN  ARIPiprazole (ABILIFY) tablet 5 mg, 5 mg, Oral, Daily  OLANZapine (ZYPREXA) tablet 10 mg, 10 mg, Oral, Q6H PRN **OR** OLANZapine (ZYPREXA) 10 mg in sterile water

## 2022-09-02 PROCEDURE — 1240000000 HC EMOTIONAL WELLNESS R&B

## 2022-09-02 PROCEDURE — 6370000000 HC RX 637 (ALT 250 FOR IP)

## 2022-09-02 PROCEDURE — 6370000000 HC RX 637 (ALT 250 FOR IP): Performed by: PSYCHIATRY & NEUROLOGY

## 2022-09-02 RX ORDER — BACITRACIN, NEOMYCIN, POLYMYXIN B 400; 3.5; 5 [USP'U]/G; MG/G; [USP'U]/G
OINTMENT TOPICAL 2 TIMES DAILY
Status: DISCONTINUED | OUTPATIENT
Start: 2022-09-02 | End: 2022-09-04 | Stop reason: HOSPADM

## 2022-09-02 RX ORDER — PRAZOSIN HYDROCHLORIDE 1 MG/1
1 CAPSULE ORAL 2 TIMES DAILY
Status: DISCONTINUED | OUTPATIENT
Start: 2022-09-02 | End: 2022-09-04

## 2022-09-02 RX ORDER — ALBUTEROL SULFATE 90 UG/1
2 AEROSOL, METERED RESPIRATORY (INHALATION) ONCE
Status: COMPLETED | OUTPATIENT
Start: 2022-09-02 | End: 2022-09-03

## 2022-09-02 RX ADMIN — OLANZAPINE 10 MG: 10 TABLET, FILM COATED ORAL at 21:38

## 2022-09-02 RX ADMIN — OLANZAPINE 5 MG: 5 TABLET, ORALLY DISINTEGRATING ORAL at 00:01

## 2022-09-02 RX ADMIN — PRAZOSIN HYDROCHLORIDE 1 MG: 1 CAPSULE ORAL at 12:28

## 2022-09-02 RX ADMIN — BUSPIRONE HYDROCHLORIDE 10 MG: 10 TABLET ORAL at 12:54

## 2022-09-02 RX ADMIN — QUETIAPINE FUMARATE 25 MG: 25 TABLET ORAL at 18:04

## 2022-09-02 RX ADMIN — BUSPIRONE HYDROCHLORIDE 10 MG: 10 TABLET ORAL at 08:37

## 2022-09-02 RX ADMIN — BUSPIRONE HYDROCHLORIDE 10 MG: 10 TABLET ORAL at 21:38

## 2022-09-02 RX ADMIN — POLYMYXIN B SULFATE, BACITRACIN ZINC, NEOMYCIN SULFATE: 5000; 3.5; 4 OINTMENT TOPICAL at 21:36

## 2022-09-02 NOTE — PLAN OF CARE
[facility]  Treatment Team Note  Review Date & Time: 09/30/22 0930    Patient was not in treatment team      Status EXAM:   Mental Status and Behavioral Exam  Normal: No  Level of Assistance: Independent/Self  Facial Expression: Brightened  Affect: Congruent  Level of Consciousness: Alert  Frequency of Checks: 4 times per hour, close  Mood:Normal: No  Mood: Anxious  Motor Activity:Normal: Yes  Eye Contact: Fair  Observed Behavior: Cooperative  Sexual Misconduct History: Current - no  Preception: Heath to person, Heath to time, Heath to place, Heath to situation  Attention:Normal: Yes  Attention: Distractible  Thought Processes: Blocking  Thought Content:Normal: Yes  Thought Content: Poverty of content, Preoccupations  Depression Symptoms: Appetite change, Change in energy level, Feelings of helplessness, Feelings of hopelessess, Impaired concentration, Isolative, Loss of interest, Sleep disturbance  Anxiety Symptoms: Generalized  Merle Symptoms: No problems reported or observed. Hallucinations: None (Patient denies)  Delusions: No  Memory:Normal: No  Memory: Poor recent  Insight and Judgment: No  Insight and Judgment: Poor insight, Poor judgment      Suicide Risk CSSR-S:  1) Within the past month, have you wished you were dead or wished you could go to sleep and not wake up? : Yes  2) Have you actually had any thoughts of killing yourself? : Yes  3) Have you been thinking about how you might kill yourself? : Yes  5) Have you started to work out or worked out the details of how to kill yourself? Do you intend to carry out this plan? : Yes  6) Have you ever done anything, started to do anything, or prepared to do anything to end your life?: Yes      PLAN/TREATMENT RECOMMENDATIONS UPDATE:   Patient will take medication as prescribed, eat 75% of meals, attend groups, participate in milieu activities, participate in treatment team and care planning for discharge and follow up.            Gustabo Lanier Leora Servin

## 2022-09-02 NOTE — GROUP NOTE
Group Therapy Note    Date: 9/2/2022    Group Start Time: 1115  Group End Time: 1200  Group Topic: Psychoeducation    [facility]        Group Therapy Note    Topic: Negative Unconscious Thoughts (NUTS), Automatic Negative Thoughts (ANTS)    Group members explored skills to acknowledge negative thoughts when they arise, methods to challenge these thoughts, and cognitive reframing techniques. Attendees: 11       Notes: This pt was present and cooperative during group process, collaborating with peers and facilitator in process of negative thought patterns. They brainstormed with small group of peers to identify negative impulses and reality-based reframing. Status After Intervention:  Improved    Participation Level:  Active Listener and Interactive    Participation Quality: Appropriate and Attentive      Speech:  normal      Thought Process/Content: Logical      Affective Functioning: Congruent      Mood: euthymic      Level of consciousness:  Alert and Attentive      Response to Learning: Capable of insight and Progressing to goal      Endings: None Reported    Modes of Intervention: Education, Support, Socialization, Exploration, Clarifying, Problem-solving, and Activity      Discipline Responsible: Psychoeducational Specialist      Signature:  Sirisha Alejandro MM, MT-BC

## 2022-09-02 NOTE — PROGRESS NOTES
Department of Psychiatry  AttendingProgress Note  Chief Complaint: depression  Alexi Ralph continues to endorse sever anxiety and feels that nothing helps and mentioned Xanax again. I explained that we were not using Xanax due to concerns with addiction. She started Buspar yesterday and has tolerated it well thus far. We discussed adding Prazosin for anxiety and she agreed. Feels that Zyprexa is helpful in the evening for anxiety and sleep. Add Prazosin 1 mg BID. C/o pulmonary congestion. Albuterol added. Patient's chart was reviewed and collaborated with  about the treatment plan. SUBJECTIVE:    Patient is feeling unchanged. Suicidal ideation:  denies suicidal ideation. Patient does not have medication side effects. ROS: Patient has new complaints: yes - anxiety  Sleeping adequately:  Yes   Appetite adequate: Yes  Attending groups: Yes  Visitors:No    OBJECTIVE    Physical  VITALS:  /74   Pulse 82   Temp 97.3 °F (36.3 °C) (Oral)   Resp 18   Ht 5' 6\" (1.676 m)   Wt 109 lb (49.4 kg)   LMP 08/25/2022   SpO2 97%   BMI 17.59 kg/m²     Mental Status Examination:  Patients appearance was street clothes. Thoughts are Goal directed. Homicidal ideations none. No abnormal movements, tics or mannerisms. Memory intact Aims 0. Concentration Good. Alert and oriented X 4. Insight and Judgement impaired insight. Patient was cooperative.  Patient gait normal. Mood anxious, affect anxiety Hallucinations Absent, suicidal ideations no specific plan to harm self Speech normal volume  Data  Labs:   Admission on 08/30/2022   Component Date Value Ref Range Status    WBC 08/30/2022 6.3  4.0 - 11.0 K/uL Final    RBC 08/30/2022 4.06  4.00 - 5.20 M/uL Final    Hemoglobin 08/30/2022 13.6  12.0 - 16.0 g/dL Final    Hematocrit 08/30/2022 39.6  36.0 - 48.0 % Final    MCV 08/30/2022 97.4  80.0 - 100.0 fL Final    MCH 08/30/2022 33.5  26.0 - 34.0 pg Final    MCHC 08/30/2022 34.4  31.0 - 36.0 g/dL Final    RDW 08/30/2022 12.5  12.4 - 15.4 % Final    Platelets 80/66/9330 201  135 - 450 K/uL Final    MPV 08/30/2022 7.5  5.0 - 10.5 fL Final    Neutrophils % 08/30/2022 52.8  % Final    Lymphocytes % 08/30/2022 37.4  % Final    Monocytes % 08/30/2022 6.0  % Final    Eosinophils % 08/30/2022 3.1  % Final    Basophils % 08/30/2022 0.7  % Final    Neutrophils Absolute 08/30/2022 3.3  1.7 - 7.7 K/uL Final    Lymphocytes Absolute 08/30/2022 2.3  1.0 - 5.1 K/uL Final    Monocytes Absolute 08/30/2022 0.4  0.0 - 1.3 K/uL Final    Eosinophils Absolute 08/30/2022 0.2  0.0 - 0.6 K/uL Final    Basophils Absolute 08/30/2022 0.0  0.0 - 0.2 K/uL Final    Sodium 08/30/2022 139  136 - 145 mmol/L Final    Potassium reflex Magnesium 08/30/2022 4.4  3.5 - 5.1 mmol/L Final    Chloride 08/30/2022 104  99 - 110 mmol/L Final    CO2 08/30/2022 26  21 - 32 mmol/L Final    Anion Gap 08/30/2022 9  3 - 16 Final    Glucose 08/30/2022 100 (A) 70 - 99 mg/dL Final    BUN 08/30/2022 11  7 - 20 mg/dL Final    Creatinine 08/30/2022 0.9  0.6 - 1.1 mg/dL Final    GFR Non- 08/30/2022 >60  >60 Final    Comment: >60 mL/min/1.73m2 EGFR, calc. for ages 25 and older using the  MDRD formula (not corrected for weight), is valid for stable  renal function. GFR  08/30/2022 >60  >60 Final    Comment: Chronic Kidney Disease: less than 60 ml/min/1.73 sq.m. Kidney Failure: less than 15 ml/min/1.73 sq.m. Results valid for patients 18 years and older.       Calcium 08/30/2022 9.1  8.3 - 10.6 mg/dL Final    Total Protein 08/30/2022 7.3  6.4 - 8.2 g/dL Final    Albumin 08/30/2022 4.8  3.4 - 5.0 g/dL Final    Albumin/Globulin Ratio 08/30/2022 1.9  1.1 - 2.2 Final    Total Bilirubin 08/30/2022 0.6  0.0 - 1.0 mg/dL Final    Alkaline Phosphatase 08/30/2022 46  40 - 129 U/L Final    ALT 08/30/2022 13  10 - 40 U/L Final    AST 08/30/2022 15  15 - 37 U/L Final    Acetaminophen Level 08/30/2022 <5 (A) 10 - 30 ug/mL Final    Comment: Therapeutic Range: 10.0-30.0 ug/mL  Toxic: >=921 ug/mL      Salicylate, Serum 00/69/4462 <0.3 (A) 15.0 - 30.0 mg/dL Final    Comment: Therapeutic Range: 15.0-30.0 mg/dL  Toxic: >30.0 mg/dL      Ethanol Lvl 08/30/2022 None Detected  mg/dL Final    Comment:    None Detected  Conversion factor:  100 mg/dl = .100 g/dl  For Medical Purposes Only      hCG Qual 08/30/2022 Negative  Detects HCG level >10 MIU/mL Final    Amphetamine Screen, Urine 08/30/2022 Neg  Negative <1000ng/mL Final    Barbiturate Screen, Ur 08/30/2022 Neg  Negative <200 ng/mL Final    Benzodiazepine Screen, Urine 08/30/2022 Neg  Negative <200 ng/mL Final    Cannabinoid Scrn, Ur 08/30/2022 POSITIVE (A) Negative <50 ng/mL Final    Cocaine Metabolite Screen, Urine 08/30/2022 Neg  Negative <300 ng/mL Final    Opiate Scrn, Ur 08/30/2022 Neg  Negative <300 ng/mL Final    Comment: \"Therapeutic levels of pain medication, especially oxycontin and synthetic  opioids, may not be detected by this Methodology. Pain management screen  panel  Drug panel-PM-Hi Res Ur, Interp (PAIN) should be considered for drug  monitoring \". PCP Screen, Urine 08/30/2022 Neg  Negative <25 ng/mL Final    Methadone Screen, Urine 08/30/2022 Neg  Negative <300 ng/mL Final    Propoxyphene Scrn, Ur 08/30/2022 Neg  Negative <300 ng/mL Final    Oxycodone Urine 08/30/2022 Neg  Negative <100 ng/ml Final    pH, UA 08/30/2022 5.0   Final    Comment: Urine pH less than 5.0 or greater than 8.0 may indicate sample adulteration. Another sample should be collected if clinically  indicated. Drug Screen Comment: 08/30/2022 see below   Final    Comment: This method is a screening test to detect only these drug  classes as part of a medical workup. Confirmatory testing  by another method should be ordered if clinically indicated.       SARS-CoV-2 RNA, RT PCR 08/30/2022 NOT DETECTED  NOT DETECTED Final    Comment: Not Detected results do not preclude SARS-CoV-2 infection and  should not be used as the sole basis for patient management  decisions. Results must be combined with clinical observations,  patient history, and epidemiological information. Testing was performed using [employer] SARS-CoV-2 and Influenza A/B  nucleic acid assay. This test is a multiplex Real-Time Reverse  Transcriptase Polymerase Chain Reaction (RT-PCR)-based in vitro  diagnostic test intended for the qualitative detection of nucleic  acids from SARS-CoV-2, influenza A, and influenza B in nasopharyngeal  and nasal swab specimens for use under the FDAs Emergency Use  Authorization (EUA) only. Patient Fact Sheet:  FindDrives.pl  Provider Fact Sheet: FindDrives.pl  EUA: FindDrives.pl  IFU: FindDrives.pl    Methodology:  RT-PCR      INFLUENZA A 08/30/2022 NOT DETECTED  NOT DETECTED Final    INFLUENZA B 08/30/2022 NOT DETECTED  NOT DETECTED Final    TSH 08/30/2022 0.71  0.27 - 4.20 uIU/mL Final    Cholesterol, Total 08/30/2022 135  0 - 199 mg/dL Final    Triglycerides 08/30/2022 66  0 - 150 mg/dL Final    HDL 08/30/2022 53  40 - 60 mg/dL Final    LDL Calculated 08/30/2022 69  <100 mg/dL Final    VLDL Cholesterol Calculated 08/30/2022 13  Not Established mg/dL Final    Hemoglobin A1C 08/30/2022 4.6  See comment % Final    Comment: Comment:  Diagnosis of Diabetes: > or = 6.5%  Increased risk of diabetes (Prediabetes): 5.7-6.4%  Glycemic Control: Nonpregnant Adults: <7.0%                    Pregnant: <6.0%        eAG 08/30/2022 85.3  mg/dL Final    Rapid Strep A Screen 09/01/2022 Negative  Negative Final    Comment: A culture confirmation plate has been set up and a separate  report will follow. See micro report.               Medications  Current Facility-Administered Medications: albuterol sulfate HFA (PROVENTIL;VENTOLIN;PROAIR) 108 (90 Base) MCG/ACT inhaler 2 puff, 2 puff, Inhalation, Once  prazosin (MINIPRESS) capsule 1 mg, 1 mg, Oral, BID  neomycin-bacitracin-polymyxin (NEOSPORIN) ointment, , Topical, BID  ondansetron (ZOFRAN-ODT) disintegrating tablet 4 mg, 4 mg, Oral, Q8H PRN  busPIRone (BUSPAR) tablet 10 mg, 10 mg, Oral, TID  ibuprofen (ADVIL;MOTRIN) tablet 400 mg, 400 mg, Oral, Q6H PRN  ARIPiprazole (ABILIFY) tablet 5 mg, 5 mg, Oral, Daily  OLANZapine (ZYPREXA) tablet 10 mg, 10 mg, Oral, Q6H PRN **OR** OLANZapine (ZYPREXA) 10 mg in sterile water 2 mL injection, 10 mg, IntraMUSCular, Q6H PRN  nicotine (NICODERM CQ) 14 MG/24HR 1 patch, 1 patch, TransDERmal, Daily  guaiFENesin (MUCINEX) extended release tablet 600 mg, 600 mg, Oral, BID  benzonatate (TESSALON) capsule 100 mg, 100 mg, Oral, TID PRN  albuterol sulfate HFA (PROVENTIL;VENTOLIN;PROAIR) 108 (90 Base) MCG/ACT inhaler 2 puff, 2 puff, Inhalation, Q6H PRN  QUEtiapine (SEROQUEL) tablet 25 mg, 25 mg, Oral, Q6H PRN  tetanus & diphtheria toxoids (adult) 5-2 LFU injection 0.5 mL, 0.5 mL, IntraMUSCular, Prior to discharge  acetaminophen (TYLENOL) tablet 650 mg, 650 mg, Oral, Q4H PRN  magnesium hydroxide (MILK OF MAGNESIA) 400 MG/5ML suspension 30 mL, 30 mL, Oral, Daily PRN  nicotine polacrilex (COMMIT) lozenge 2 mg, 2 mg, Oral, Q1H PRN  aluminum & magnesium hydroxide-simethicone (MAALOX) 200-200-20 MG/5ML suspension 30 mL, 30 mL, Oral, Q6H PRN  diphenhydrAMINE (BENADRYL) injection 50 mg, 50 mg, IntraMUSCular, Q4H PRN  traZODone (DESYREL) tablet 50 mg, 50 mg, Oral, Nightly PRN  diphenhydrAMINE (BENADRYL) tablet 50 mg, 50 mg, Oral, Once    ASSESSMENT AND PLAN    Principal Problem:    Borderline personality disorder (HCC)  Active Problems:    Current severe episode of major depressive disorder without psychotic features (HCC)    Cannabis abuse  Resolved Problems:    * No resolved hospital problems. *       1. Patient s symptoms   show no change  2. Probable discharge is next week  3. Discharge planning is incomplete  4. Suicidal ideation is  none  5.  Total time with patient was 40 minutes and more than 50 % of that time was spent counseling the patient on their symptoms, treatment and expected goals.

## 2022-09-02 NOTE — PROGRESS NOTES
Patient up ad rhoda. She has presented many somatic complaints. She complains of anxiety, reports that nothing is helping. She has been socializing, laughing, and playing games with peers in milieu. PRN seroquel provided after reporting prazosin and buspar X2 being ineffective. Pt reported a high pulse rate, pulse was WNL each time it was checked. Pt complains of a sore throat and built up mucus. However, refused mucinex this morning. Pt provided emotional support and redirection. Monitored for safety and comfort.

## 2022-09-02 NOTE — PROGRESS NOTES
Kylah spent the evening watching tv, socializing, and talking on the phone. She asked for Zyprexa 5mg instead of Zyprexa 10mg. [employer] ordered it; it was administered & was effective. Kylah did not have any health-related complaints tonight. She refused Mucinex, but took Buspar. Kylah was provided with petroleum-infused band-aids after she complained of the lacerations on her arms and abdomen itching.

## 2022-09-02 NOTE — PROGRESS NOTES
Timothy Boswell advised Trazodone \"doesn't work\" and asked for Zyprexa instead. She stated that she only wanted half of a dose (5mg) because a full dose (10mg) makes her \"too sleepy\" in the morning. Deric Taylor ordered Zyprexa PO 5mg and it was administered according to order. See MAR.

## 2022-09-02 NOTE — PLAN OF CARE
Problem: Self Harm/Suicidality  Goal: Will have no self-injury during hospital stay  Description: INTERVENTIONS:  1. Q 30 MINUTES: Routine safety checks  2. Q SHIFT & PRN: Assess risk to determine if routine checks are adequate to maintain patient safety  9/2/2022 0207 by Larene Goltz, RN  Outcome: Progressing     Problem: Depression/Self Harm  Goal: Effect of psychiatric condition will be minimized and patient will be protected from self harm  Description: INTERVENTIONS:  1. Assess impact of patient's symptoms on level of functioning, self care needs and offer support as indicated  2. Assess patient/family knowledge of depression, impact on illness and need for teaching  3. Provide emotional support, presence and reassurance  4. Assess for possible suicidal thoughts or ideation. If patient expresses suicidal thoughts or statements do not leave alone, initiate Suicide Precautions, move to a room close to the nursing station and obtain sitter  5.  Initiate consults as appropriate with Mental Health Professional, Spiritual Care, Psychosocial CNS, and consider a recommendation to the [facility]IP for a Psychiatric Consultation  9/2/2022 0207 by Larene Goltz, RN  Outcome: Progressing

## 2022-09-02 NOTE — PLAN OF CARE
Problem: Depression/Self Harm  Goal: Effect of psychiatric condition will be minimized and patient will be protected from self harm  Description: INTERVENTIONS:  1. Assess impact of patient's symptoms on level of functioning, self care needs and offer support as indicated  2. Assess patient/family knowledge of depression, impact on illness and need for teaching  3. Provide emotional support, presence and reassurance  4. Assess for possible suicidal thoughts or ideation. If patient expresses suicidal thoughts or statements do not leave alone, initiate Suicide Precautions, move to a room close to the nursing station and obtain sitter  5. Initiate consults as appropriate with Mental Health Professional, Spiritual Care, Psychosocial CNS, and consider a recommendation to the [facility] for a Psychiatric Consultation  Outcome: Not Progressing     Problem: Depression/Self Harm  Goal: Effect of psychiatric condition will be minimized and patient will be protected from self harm  Description: INTERVENTIONS:  1. Assess impact of patient's symptoms on level of functioning, self care needs and offer support as indicated  2. Assess patient/family knowledge of depression, impact on illness and need for teaching  3. Provide emotional support, presence and reassurance  4. Assess for possible suicidal thoughts or ideation. If patient expresses suicidal thoughts or statements do not leave alone, initiate Suicide Precautions, move to a room close to the nursing station and obtain sitter  5.  Initiate consults as appropriate with Mental Health Professional, Spiritual Care, Psychosocial CNS, and consider a recommendation to the [facility] for a Psychiatric Consultation  Outcome: Not Progressing     Problem: Self Harm/Suicidality  Goal: Will have no self-injury during hospital stay  Description: INTERVENTIONS:  1. Q 30 MINUTES: Routine safety checks  2. Q SHIFT & PRN: Assess risk to determine if routine checks are adequate to maintain patient safety  Outcome: Progressing

## 2022-09-02 NOTE — PROGRESS NOTES
Estuardo Bianchi is resting comfortably in her room. No obvious signs of distress. Zyprexa is noted to be effective.

## 2022-09-03 LAB — S PYO THROAT QL CULT: NORMAL

## 2022-09-03 PROCEDURE — 6370000000 HC RX 637 (ALT 250 FOR IP): Performed by: PSYCHIATRY & NEUROLOGY

## 2022-09-03 PROCEDURE — 6370000000 HC RX 637 (ALT 250 FOR IP)

## 2022-09-03 PROCEDURE — 94640 AIRWAY INHALATION TREATMENT: CPT

## 2022-09-03 PROCEDURE — 1240000000 HC EMOTIONAL WELLNESS R&B

## 2022-09-03 RX ORDER — CLONAZEPAM 0.5 MG/1
0.5 TABLET ORAL 2 TIMES DAILY PRN
Status: DISCONTINUED | OUTPATIENT
Start: 2022-09-03 | End: 2022-09-04 | Stop reason: HOSPADM

## 2022-09-03 RX ADMIN — Medication 2 PUFF: at 15:57

## 2022-09-03 RX ADMIN — BUSPIRONE HYDROCHLORIDE 10 MG: 10 TABLET ORAL at 22:36

## 2022-09-03 RX ADMIN — POLYMYXIN B SULFATE, BACITRACIN ZINC, NEOMYCIN SULFATE: 5000; 3.5; 4 OINTMENT TOPICAL at 21:00

## 2022-09-03 RX ADMIN — BUSPIRONE HYDROCHLORIDE 10 MG: 10 TABLET ORAL at 13:52

## 2022-09-03 RX ADMIN — OLANZAPINE 10 MG: 10 TABLET, FILM COATED ORAL at 22:37

## 2022-09-03 RX ADMIN — ARIPIPRAZOLE 5 MG: 10 TABLET ORAL at 13:51

## 2022-09-03 RX ADMIN — GUAIFENESIN 600 MG: 600 TABLET, EXTENDED RELEASE ORAL at 09:25

## 2022-09-03 RX ADMIN — GUAIFENESIN 600 MG: 600 TABLET, EXTENDED RELEASE ORAL at 22:36

## 2022-09-03 RX ADMIN — POLYMYXIN B SULFATE, BACITRACIN ZINC, NEOMYCIN SULFATE: 5000; 3.5; 4 OINTMENT TOPICAL at 09:29

## 2022-09-03 RX ADMIN — Medication 2 PUFF: at 09:16

## 2022-09-03 RX ADMIN — BUSPIRONE HYDROCHLORIDE 10 MG: 10 TABLET ORAL at 09:24

## 2022-09-03 ASSESSMENT — PAIN SCALES - GENERAL: PAINLEVEL_OUTOF10: 0

## 2022-09-03 NOTE — PROGRESS NOTES
Department of Psychiatry  AttendingProgress Note  Chief Complaint: anxiety  Lizeth Back continus to appear rather somatic as she seeks me out to ask about some physical issue or medication. She had a breathing tx earlier which helped with her congestion. She also talked about how she experienced tachycardia prior to that ,.   Stopped the Prazosin as she fesl that she feels more anxious with it. Added Clonazepam 0.5 mg prn for anxiety but explained that she would not  be discharged with it. Patient's chart was reviewed and collaborated with  about the treatment plan. SUBJECTIVE:    Patient is feeling unchanged. Suicidal ideation:  denies suicidal ideation. Patient does not have medication side effects. ROS: Patient has new complaints: no  Sleeping adequately:  Yes   Appetite adequate: Yes  Attending groups: Yes  Visitors:No    OBJECTIVE    Physical  VITALS:  /64   Pulse 74   Temp 98.2 °F (36.8 °C) (Oral)   Resp 16   Ht 5' 6\" (1.676 m)   Wt 109 lb (49.4 kg)   LMP 08/25/2022   SpO2 96%   BMI 17.59 kg/m²     Mental Status Examination:  Patients appearance was street clothes. Thoughts are Goal directed. Homicidal ideations none. No abnormal movements, tics or mannerisms. Memory intact Aims 0. Concentration Good. Alert and oriented X 4. Insight and Judgement impaired insight. Patient was cooperative.  Patient gait normal. Mood anxious, affect anxiety Hallucinations Absent, suicidal ideations no specific plan to harm self Speech normal volume  Data  Labs:   Admission on 08/30/2022   Component Date Value Ref Range Status    WBC 08/30/2022 6.3  4.0 - 11.0 K/uL Final    RBC 08/30/2022 4.06  4.00 - 5.20 M/uL Final    Hemoglobin 08/30/2022 13.6  12.0 - 16.0 g/dL Final    Hematocrit 08/30/2022 39.6  36.0 - 48.0 % Final    MCV 08/30/2022 97.4  80.0 - 100.0 fL Final    MCH 08/30/2022 33.5  26.0 - 34.0 pg Final    MCHC 08/30/2022 34.4  31.0 - 36.0 g/dL Final    RDW 08/30/2022 12.5  12.4 - 15.4 % >=376 ug/mL      Salicylate, Serum 82/36/5522 <0.3 (A) 15.0 - 30.0 mg/dL Final    Comment: Therapeutic Range: 15.0-30.0 mg/dL  Toxic: >30.0 mg/dL      Ethanol Lvl 08/30/2022 None Detected  mg/dL Final    Comment:    None Detected  Conversion factor:  100 mg/dl = .100 g/dl  For Medical Purposes Only      hCG Qual 08/30/2022 Negative  Detects HCG level >10 MIU/mL Final    Amphetamine Screen, Urine 08/30/2022 Neg  Negative <1000ng/mL Final    Barbiturate Screen, Ur 08/30/2022 Neg  Negative <200 ng/mL Final    Benzodiazepine Screen, Urine 08/30/2022 Neg  Negative <200 ng/mL Final    Cannabinoid Scrn, Ur 08/30/2022 POSITIVE (A) Negative <50 ng/mL Final    Cocaine Metabolite Screen, Urine 08/30/2022 Neg  Negative <300 ng/mL Final    Opiate Scrn, Ur 08/30/2022 Neg  Negative <300 ng/mL Final    Comment: \"Therapeutic levels of pain medication, especially oxycontin and synthetic  opioids, may not be detected by this Methodology. Pain management screen  panel  Drug panel-PM-Hi Res Ur, Interp (PAIN) should be considered for drug  monitoring \". PCP Screen, Urine 08/30/2022 Neg  Negative <25 ng/mL Final    Methadone Screen, Urine 08/30/2022 Neg  Negative <300 ng/mL Final    Propoxyphene Scrn, Ur 08/30/2022 Neg  Negative <300 ng/mL Final    Oxycodone Urine 08/30/2022 Neg  Negative <100 ng/ml Final    pH, UA 08/30/2022 5.0   Final    Comment: Urine pH less than 5.0 or greater than 8.0 may indicate sample adulteration. Another sample should be collected if clinically  indicated. Drug Screen Comment: 08/30/2022 see below   Final    Comment: This method is a screening test to detect only these drug  classes as part of a medical workup. Confirmatory testing  by another method should be ordered if clinically indicated.       SARS-CoV-2 RNA, RT PCR 08/30/2022 NOT DETECTED  NOT DETECTED Final    Comment: Not Detected results do not preclude SARS-CoV-2 infection and  should not be used as the sole basis for patient management  decisions. Results must be combined with clinical observations,  patient history, and epidemiological information. Testing was performed using [employer] SARS-CoV-2 and Influenza A/B  nucleic acid assay. This test is a multiplex Real-Time Reverse  Transcriptase Polymerase Chain Reaction (RT-PCR)-based in vitro  diagnostic test intended for the qualitative detection of nucleic  acids from SARS-CoV-2, influenza A, and influenza B in nasopharyngeal  and nasal swab specimens for use under the FDAs Emergency Use  Authorization (EUA) only. Patient Fact Sheet:  FindDrives.pl  Provider Fact Sheet: FindDrives.pl  EUA: FindDrives.pl  IFU: FindDrives.pl    Methodology:  RT-PCR      INFLUENZA A 08/30/2022 NOT DETECTED  NOT DETECTED Final    INFLUENZA B 08/30/2022 NOT DETECTED  NOT DETECTED Final    TSH 08/30/2022 0.71  0.27 - 4.20 uIU/mL Final    Cholesterol, Total 08/30/2022 135  0 - 199 mg/dL Final    Triglycerides 08/30/2022 66  0 - 150 mg/dL Final    HDL 08/30/2022 53  40 - 60 mg/dL Final    LDL Calculated 08/30/2022 69  <100 mg/dL Final    VLDL Cholesterol Calculated 08/30/2022 13  Not Established mg/dL Final    Hemoglobin A1C 08/30/2022 4.6  See comment % Final    Comment: Comment:  Diagnosis of Diabetes: > or = 6.5%  Increased risk of diabetes (Prediabetes): 5.7-6.4%  Glycemic Control: Nonpregnant Adults: <7.0%                    Pregnant: <6.0%        eAG 08/30/2022 85.3  mg/dL Final    Rapid Strep A Screen 09/01/2022 Negative  Negative Final    Comment: A culture confirmation plate has been set up and a separate  report will follow. See micro report.       Strep A Culture 09/01/2022 Further report to follow   Preliminary            Medications  Current Facility-Administered Medications: clonazePAM (KLONOPIN) tablet 0.5 mg, 0.5 mg, Oral, BID PRN  prazosin (MINIPRESS) capsule 1 mg, 1 mg, Oral, BID  neomycin-bacitracin-polymyxin (NEOSPORIN) ointment, , Topical, BID  ondansetron (ZOFRAN-ODT) disintegrating tablet 4 mg, 4 mg, Oral, Q8H PRN  busPIRone (BUSPAR) tablet 10 mg, 10 mg, Oral, TID  ibuprofen (ADVIL;MOTRIN) tablet 400 mg, 400 mg, Oral, Q6H PRN  ARIPiprazole (ABILIFY) tablet 5 mg, 5 mg, Oral, Daily  OLANZapine (ZYPREXA) tablet 10 mg, 10 mg, Oral, Q6H PRN **OR** OLANZapine (ZYPREXA) 10 mg in sterile water 2 mL injection, 10 mg, IntraMUSCular, Q6H PRN  nicotine (NICODERM CQ) 14 MG/24HR 1 patch, 1 patch, TransDERmal, Daily  guaiFENesin (MUCINEX) extended release tablet 600 mg, 600 mg, Oral, BID  benzonatate (TESSALON) capsule 100 mg, 100 mg, Oral, TID PRN  albuterol sulfate HFA (PROVENTIL;VENTOLIN;PROAIR) 108 (90 Base) MCG/ACT inhaler 2 puff, 2 puff, Inhalation, Q6H PRN  QUEtiapine (SEROQUEL) tablet 25 mg, 25 mg, Oral, Q6H PRN  tetanus & diphtheria toxoids (adult) 5-2 LFU injection 0.5 mL, 0.5 mL, IntraMUSCular, Prior to discharge  acetaminophen (TYLENOL) tablet 650 mg, 650 mg, Oral, Q4H PRN  magnesium hydroxide (MILK OF MAGNESIA) 400 MG/5ML suspension 30 mL, 30 mL, Oral, Daily PRN  nicotine polacrilex (COMMIT) lozenge 2 mg, 2 mg, Oral, Q1H PRN  aluminum & magnesium hydroxide-simethicone (MAALOX) 200-200-20 MG/5ML suspension 30 mL, 30 mL, Oral, Q6H PRN  diphenhydrAMINE (BENADRYL) injection 50 mg, 50 mg, IntraMUSCular, Q4H PRN  traZODone (DESYREL) tablet 50 mg, 50 mg, Oral, Nightly PRN  diphenhydrAMINE (BENADRYL) tablet 50 mg, 50 mg, Oral, Once    ASSESSMENT AND PLAN    Principal Problem:    Borderline personality disorder (HCC)  Active Problems:    Current severe episode of major depressive disorder without psychotic features (HCC)    Cannabis abuse  Resolved Problems:    * No resolved hospital problems. *       1. Patient s symptoms   are improving  2. Probable discharge is next week  3. Discharge planning is incomplete  4. Suicidal ideation is  none  5.  Total time with patient was 40 minutes and more than 50 % of that time was spent counseling the patient on their symptoms, treatment and expected goals.

## 2022-09-03 NOTE — PLAN OF CARE
Problem: Self Harm/Suicidality  Goal: Will have no self-injury during hospital stay  Description: INTERVENTIONS:  1. Q 30 MINUTES: Routine safety checks  2. Q SHIFT & PRN: Assess risk to determine if routine checks are adequate to maintain patient safety  9/2/2022 2146 by See Aguilar RN  Outcome: Progressing     Problem: Depression/Self Harm  Goal: Effect of psychiatric condition will be minimized and patient will be protected from self harm  Description: INTERVENTIONS:  1. Assess impact of patient's symptoms on level of functioning, self care needs and offer support as indicated  2. Assess patient/family knowledge of depression, impact on illness and need for teaching  3. Provide emotional support, presence and reassurance  4. Assess for possible suicidal thoughts or ideation. If patient expresses suicidal thoughts or statements do not leave alone, initiate Suicide Precautions, move to a room close to the nursing station and obtain sitter  5. Initiate consults as appropriate with Mental Health Professional, Spiritual Care, Psychosocial CNS, and consider a recommendation to the [facility] for a Psychiatric Consultation  9/2/2022 2146 by See Aguilar RN  Outcome: Progressing     Problem: Depression/Self Harm  Goal: Effect of psychiatric condition will be minimized and patient will be protected from self harm  Description: INTERVENTIONS:  1. Assess impact of patient's symptoms on level of functioning, self care needs and offer support as indicated  2. Assess patient/family knowledge of depression, impact on illness and need for teaching  3. Provide emotional support, presence and reassurance  4. Assess for possible suicidal thoughts or ideation. If patient expresses suicidal thoughts or statements do not leave alone, initiate Suicide Precautions, move to a room close to the nursing station and obtain sitter  5.  Initiate consults as appropriate with Mental Health Professional, Spiritual Care, Psychosocial CNS, and consider a recommendation to the [facility] for a Psychiatric Consultation  9/2/2022 2146 by Bar Helton, RN  Outcome: Progressing    Pt currently denies all, states that she still feels anxious however and is worried that her minipress is making her HR elevated and refuses to take it this evening. Pt went to [address] this evening and can be seen on the unit socializing with other patients. Requested and received PRN Zyprexa for anxiety and agitation.

## 2022-09-03 NOTE — PLAN OF CARE
Problem: Self Harm/Suicidality  Goal: Will have no self-injury during hospital stay  Description: INTERVENTIONS:  1. Q 30 MINUTES: Routine safety checks  2. Q SHIFT & PRN: Assess risk to determine if routine checks are adequate to maintain patient safety  9/3/2022 1037 by Tati Bose RN  Outcome: Progressing     Problem: Depression/Self Harm  Goal: Effect of psychiatric condition will be minimized and patient will be protected from self harm  Description: INTERVENTIONS:  1. Assess impact of patient's symptoms on level of functioning, self care needs and offer support as indicated  2. Assess patient/family knowledge of depression, impact on illness and need for teaching  3. Provide emotional support, presence and reassurance  4. Assess for possible suicidal thoughts or ideation. If patient expresses suicidal thoughts or statements do not leave alone, initiate Suicide Precautions, move to a room close to the nursing station and obtain sitter  5. Initiate consults as appropriate with Mental Health Professional, Spiritual Care, Psychosocial CNS, and consider a recommendation to the [facility]IP for a Psychiatric Consultation  9/3/2022 1037 by Tati Bose RN  Outcome: Progressing  Flowsheets  Taken 9/3/2022 1035  Effect of psychiatric condition will be minimized and patient will be protected from self harm: Assess for suicidal thoughts or ideation. If patient expresses suicidal thoughts or statements do not leave alone, initiate Suicide Precautions, move near nurse station, obtain sitter  Taken 9/3/2022 1027  Effect of psychiatric condition will be minimized and patient will be protected from self harm: Assess for suicidal thoughts or ideation. If patient expresses suicidal thoughts or statements do not leave alone, initiate Suicide Precautions, move near nurse station, obtain sitter   Rosanne Enoclynn was seen at the  this morning c/o congested cough and asking for a breathing treatment.  + frequent congested cough noted, resp therapy called to request a treatment per her request. Pt reports no SI,HI or AVH. Denies SI,HI or AVH and CFS. Pt refuses minipress and ordered Abilify, stating her body reacts differently than it should.

## 2022-09-04 VITALS
TEMPERATURE: 97.7 F | WEIGHT: 109 LBS | OXYGEN SATURATION: 97 % | HEIGHT: 66 IN | SYSTOLIC BLOOD PRESSURE: 113 MMHG | DIASTOLIC BLOOD PRESSURE: 81 MMHG | HEART RATE: 112 BPM | RESPIRATION RATE: 16 BRPM | BODY MASS INDEX: 17.52 KG/M2

## 2022-09-04 PROCEDURE — 6370000000 HC RX 637 (ALT 250 FOR IP)

## 2022-09-04 PROCEDURE — 94640 AIRWAY INHALATION TREATMENT: CPT

## 2022-09-04 PROCEDURE — 6370000000 HC RX 637 (ALT 250 FOR IP): Performed by: PSYCHIATRY & NEUROLOGY

## 2022-09-04 PROCEDURE — 5130000000 HC BRIDGE APPOINTMENT

## 2022-09-04 RX ORDER — GUAIFENESIN 600 MG/1
600 TABLET, EXTENDED RELEASE ORAL 2 TIMES DAILY
Qty: 60 TABLET | Refills: 0 | Status: SHIPPED | OUTPATIENT
Start: 2022-09-04 | End: 2022-09-29

## 2022-09-04 RX ORDER — ARIPIPRAZOLE 5 MG/1
5 TABLET ORAL DAILY
Qty: 30 TABLET | Refills: 0 | Status: SHIPPED | OUTPATIENT
Start: 2022-09-05 | End: 2022-10-03 | Stop reason: SDUPTHER

## 2022-09-04 RX ORDER — BACITRACIN, NEOMYCIN, POLYMYXIN B 400; 3.5; 5 [USP'U]/G; MG/G; [USP'U]/G
OINTMENT TOPICAL
Qty: 1 EACH | Refills: 0 | Status: SHIPPED | OUTPATIENT
Start: 2022-09-04 | End: 2022-09-14

## 2022-09-04 RX ORDER — BUSPIRONE HYDROCHLORIDE 10 MG/1
10 TABLET ORAL 3 TIMES DAILY
Qty: 90 TABLET | Refills: 0 | Status: SHIPPED | OUTPATIENT
Start: 2022-09-04 | End: 2022-10-03 | Stop reason: SDUPTHER

## 2022-09-04 RX ADMIN — BUSPIRONE HYDROCHLORIDE 10 MG: 10 TABLET ORAL at 08:34

## 2022-09-04 RX ADMIN — BUSPIRONE HYDROCHLORIDE 10 MG: 10 TABLET ORAL at 13:27

## 2022-09-04 RX ADMIN — Medication 2 PUFF: at 08:13

## 2022-09-04 RX ADMIN — Medication 2 PUFF: at 13:39

## 2022-09-04 RX ADMIN — POLYMYXIN B SULFATE, BACITRACIN ZINC, NEOMYCIN SULFATE: 5000; 3.5; 4 OINTMENT TOPICAL at 08:39

## 2022-09-04 RX ADMIN — GUAIFENESIN 600 MG: 600 TABLET, EXTENDED RELEASE ORAL at 08:34

## 2022-09-04 RX ADMIN — ARIPIPRAZOLE 5 MG: 10 TABLET ORAL at 08:34

## 2022-09-04 NOTE — PROGRESS NOTES
Patient requested and received zyprex 10 mg po for c/o agitation and feeling that she is on edge. Will monitor effectiveness

## 2022-09-04 NOTE — PLAN OF CARE
Problem: Self Harm/Suicidality  Goal: Will have no self-injury during hospital stay  Description: INTERVENTIONS:  1. Q 30 MINUTES: Routine safety checks  2. Q SHIFT & PRN: Assess risk to determine if routine checks are adequate to maintain patient safety  9/4/2022 0910 by Shaquille Pizaror RN  Outcome: Progressing     Problem: Depression/Self Harm  Goal: Effect of psychiatric condition will be minimized and patient will be protected from self harm  Description: INTERVENTIONS:  1. Assess impact of patient's symptoms on level of functioning, self care needs and offer support as indicated  2. Assess patient/family knowledge of depression, impact on illness and need for teaching  3. Provide emotional support, presence and reassurance  4. Assess for possible suicidal thoughts or ideation. If patient expresses suicidal thoughts or statements do not leave alone, initiate Suicide Precautions, move to a room close to the nursing station and obtain sitter  5. Initiate consults as appropriate with Mental Health Professional, Spiritual Care, Psychosocial CNS, and consider a recommendation to the [facility]IP for a Psychiatric Consultation  9/4/2022 0910 by Shaquille Pizarro RN  Outcome: Progressing  Flowsheets (Taken 9/4/2022 0909)  Effect of psychiatric condition will be minimized and patient will be protected from self harm: Assess for suicidal thoughts or ideation.  If patient expresses suicidal thoughts or statements do not leave alone, initiate Suicide Precautions, move near nurse station, obtain sitter     Problem: Pain  Goal: Verbalizes/displays adequate comfort level or baseline comfort level  9/4/2022 0910 by Shaquille Pizarro RN  Outcome: Progressing

## 2022-09-04 NOTE — PROGRESS NOTES
Patient is noted to be in her room at this time. She is winding down for sleep. Medication is noted to be effective.

## 2022-09-04 NOTE — DISCHARGE INSTRUCTIONS
Advanced Directives:  Patient does not have a surrogate decision maker appointed   Name (if yes):    Phone Number:     Patient does not have a psychiatric and/ or medical advanced directive or power of . Patient was offered psychiatric and/ or medical advanced directive or power of  information/completion but declined to complete   Why not? Discharge Planning is Complete. Discharge Date: 9/4/22   Reason for Hospitalization: DEPRESSION  Discharge Diagnosis: DEPRESSION  Discharging to: HOME    Your instructions: Your physician here was Jonatan Sargent MD. If you have any questions please call the unit at [phone] for the adult unit or [phone] for [facility]. Please note that we have a patient family advisory [employer] that meets the second Wednesday of January and the second Wednesday of July at [address] in [place] at [facility]. Department leadership would love for you to attend to give feedback on what we are doing well and areas in which we can improve our patient care. Please come if you are able and feel free to reach out to [facility] at [phone] with any questions. The crisis number for [facility] is [phone] (SAVE). This crisis line is available 24 hours a day, seven days a week. YOU HAVE STATED YOU HAVE A PCP. DR. HENRIQUEZ. PLEASE FOLLOW UP AFTER DISCHARGE. WE ARE UNABLE TO MAKE THE APPOINTMENT DUE TO THE WEEKEND    Please follow up with your PCP regarding any pending labs. Your next appointment is:  Name of Provider: DR. HENRIQUEZ   Provider specialty/license: PCP   Agency name:[facility]   Address: [address] [place] [address]  Phone Number: [phone]  Special instructions (what to bring to appointment, etc.): ID, INSURANCE CARD AND COPY OF YOUR DISCHARGE SUMMARY     YOU HAVE STATED YOU ARE CONNECTED WITH Priscilla Moody 75.  DUE TO THE WEEKEND, WE ARE UNABLE TO SET UP A FOLLOW UP APPOINTMENT     Other appointments:   Name of Provider: Ileana Niño   Provider specialty/license: THERAPIST    Agency name: [facility]   Address: [address], [address] [place]  Phone Number: [phone]  Special instructions (what to bring to appointment, etc.): ID, INSURANCE CARD AND COPY OF YOUR DISCHARGE SUMMARY     Discharge Completed By: Tamara Schwartz MSW, LSW  Fax to: Follow up provider name and number        The following personal items were collected during your admission and were returned to you:    Belongings  Dental Appliances: Uppers  Vision - Corrective Lenses: None  Hearing Aid: None  Clothing: Bathrobe, Pants, Shirt, Undergarments, Footwear  Jewelry: Ring (nose piercing)  Body Piercings Removed: No  Electronic Devices: None  Weapons (Notify Protective Services/Security): None  Other Valuables: Personal Toiletries, Other (Comment), Lighter/Matches (denture cup, denture paste,3 toothbrushes, 2 sprial notebooks, backpack, VS bag, bree)  Home Medications: None  Valuables Given To: Aleyda Oconnell, Patient  Provide Name(s) of Who Valuable(s) Were Given To: chetna  Patient approves for provider to speak to responsible person post operatively: Yes    By signing below, I understand and acknowledge receipt of the instructions indicated above.

## 2022-09-04 NOTE — PLAN OF CARE
Problem: Self Harm/Suicidality  Goal: Will have no self-injury during hospital stay  Description: INTERVENTIONS:  1. Q 30 MINUTES: Routine safety checks  2. Q SHIFT & PRN: Assess risk to determine if routine checks are adequate to maintain patient safety  Outcome: Progressing     Problem: Depression/Self Harm  Goal: Effect of psychiatric condition will be minimized and patient will be protected from self harm  Description: INTERVENTIONS:  1. Assess impact of patient's symptoms on level of functioning, self care needs and offer support as indicated  2. Assess patient/family knowledge of depression, impact on illness and need for teaching  3. Provide emotional support, presence and reassurance  4. Assess for possible suicidal thoughts or ideation. If patient expresses suicidal thoughts or statements do not leave alone, initiate Suicide Precautions, move to a room close to the nursing station and obtain sitter  5. Initiate consults as appropriate with Mental Health Professional, Spiritual Care, Psychosocial CNS, and consider a recommendation to the LIP for a Psychiatric Consultation  Outcome: Progressing     Problem: Pain  Goal: Verbalizes/displays adequate comfort level or baseline comfort level  Outcome: Progressing   Patient denied SI/HI,A/V hallucinations this evening. No c/o pain. Patient is tolerating diet and fluids. Cheng Chahal is attending groups and is cooperative with care. Visible in day room and social with peers. Patient is able to contract for safety and will come to the staff if thoughts of self harm were to occur. Safety checks continue Q 15 minutes through out the shift.

## 2022-09-04 NOTE — BH NOTE
[facility]  Discharge Note    Pt discharged with followings belongings:   Dental Appliances: Uppers  Vision - Corrective Lenses: None  Hearing Aid: None  Jewelry: Ring (nose piercing)  Body Piercings Removed: No  Clothing: Bathrobe, Pants, Shirt, Undergarments, Footwear  Other Valuables: Personal Toiletries, Other (Comment), Lighter/Matches (denture cup, denture paste,3 toothbrushes, 2 sprial notebooks, backpack, VS bag, bree)   Valuables sent home with or returned to patient. Patient education on aftercare instructions:   Information faxed to  by   N/A at 2:08 PM   Status EXAM upon discharge:  Mental Status and Behavioral Exam  Normal: Yes  Level of Assistance: Independent/Self  Facial Expression: Brightened  Affect: Congruent, Normal  Level of Consciousness: Alert  Frequency of Checks: 4 times per hour, close  Mood:Normal: Yes  Mood: Anxious  Motor Activity:Normal: Yes  Eye Contact: Good  Observed Behavior: Cooperative, Friendly  Sexual Misconduct History: Current - no  Preception: [place] to person, [place] to time, [place] to place, [place] to situation  Attention:Normal: Yes  Attention: Distractible  Thought Processes: Circumstantial  Thought Content:Normal: Yes  Thought Content: Other (comment) (bubbly tonight, resisted a trigger, proud of accomplishment.)  Depression Symptoms: No problems reported or observed. Anxiety Symptoms: No problems reported or observed. Merle Symptoms: No problems reported or observed.   Hallucinations: None  Delusions: No  Memory:Normal: Yes  Memory: Poor recent  Insight and Judgment: No  Insight and Judgment: Poor insight    Tobacco Screening:  Practical Counseling, on admission, troy X, if applicable and completed (first 3 are required if patient doesn't refuse):         refused   ( ) Recognizing danger situations (included triggers and roadblocks)                    ( ) Coping skills (new ways to manage stress,relaxation techniques, changing routine, distraction)
Awake and alert. Lying in bed. Noted to have had emesis, sm amt of undigested food and phlem. When asked if she knew why she was having emesis \"I don't know. It happened the last time I was here. \"
David Her has been in the milieu throughout the day, social with select peers. She has been mostly medication compliant and has eaten meals in the DR.
Did not eat morning meal and has not received am medications. Requested room light be turned off.
Noted sitting with peers eating noon meal. Talkative. Somatic. Again declined am medications.
Out to team station to have oatmeal warmed up. Declined am medications \"I was throwing up for 7 hours last night\". Out in the dinning area at this time. No complaints of nausea.
Pt changed her mind after meeting with psychiatrist and requested to take her Abilify dose from earlier today.  She also is requesting to have another MDI tx at 4pm.
Pt upset that the doctor did not prescribe Zyprexa for sleep at discharge. Explained that Zyprexa is not a medication that is ordered for sleep, but that RN would pass the request on to the doctor. Instructed to follow up with her PCP for any post visit needs and to call for a new outpatient intake appointment on Tuesday.
Refused to take Abilify \"he switched it to Buspar\". Also refused mucinex.
counseling  ( X) Patient has not smoked in the last 30 days    Metabolic Screening:    Lab Results   Component Value Date    LABA1C 4.6 01/21/2021       No results for input(s): CHOL, TRIG, HDL, LDLCALC, LABVLDL in the last 72 hours. Body mass index is 17.59 kg/m². BP Readings from Last 2 Encounters:   08/30/22 108/76   03/01/21 (!) 106/52           Pt admitted with followings belongings:  Dental Appliances: Uppers  Vision - Corrective Lenses: None  Hearing Aid: None  Jewelry: Ring (nose piercing)  Body Piercings Removed: No  Clothing: Bathrobe, Pants, Shirt, Undergarments, Footwear  Other Valuables: Personal Toiletries, Other (Comment), Lighter/Matches (denture cup, denture paste,3 toothbrushes, 2 sprial notebooks, backpack, VS bag, bree)     Valuables sent home with [employer]. Valuables placed in safe in security envelope, number:  safe. Patient's home medications were N/A. Patient oriented to surroundings and program expectations and copy of patient rights given. Received admission packet:  Yes. Consents reviewed, signed Yes. Refused N/A. Patient verbalize understanding:  Yes.     Patient education on precautions: N/A                   Jenna Hernandez RN

## 2022-09-04 NOTE — PROGRESS NOTES
Time: 2100      Type of Group: wrap up group      Level of Participation: full participation      Comments: shared goal with group, encouraging of others

## 2022-09-06 ENCOUNTER — FOLLOWUP TELEPHONE ENCOUNTER (OUTPATIENT)
Dept: PSYCHIATRY | Age: 32
End: 2022-09-06

## 2022-09-06 RX ORDER — QUETIAPINE FUMARATE 25 MG/1
25 TABLET, FILM COATED ORAL 2 TIMES DAILY
Qty: 30 TABLET | Refills: 0 | Status: SHIPPED | OUTPATIENT
Start: 2022-09-06 | End: 2022-09-29

## 2022-09-06 NOTE — TELEPHONE ENCOUNTER
Pt requesting Zyprexa or Seroquel for sleep, an inhaler for congestion, and intake appt for IOP. Writer notified Dr. Macy Rao of requests. Nothing new prescribed. Pt instructed to take Melatonin for sleep and prescribed Mucinex for congestion. Pt given therapy name and number from [facility] to follow-up with.
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
## 2022-09-29 ENCOUNTER — OFFICE VISIT (OUTPATIENT)
Dept: FAMILY MEDICINE CLINIC | Age: 32
End: 2022-09-29
Payer: COMMERCIAL

## 2022-09-29 VITALS
BODY MASS INDEX: 18.64 KG/M2 | HEIGHT: 66 IN | DIASTOLIC BLOOD PRESSURE: 74 MMHG | SYSTOLIC BLOOD PRESSURE: 128 MMHG | OXYGEN SATURATION: 98 % | WEIGHT: 116 LBS | HEART RATE: 101 BPM

## 2022-09-29 DIAGNOSIS — G47.09 OTHER INSOMNIA: ICD-10-CM

## 2022-09-29 DIAGNOSIS — F60.3 BORDERLINE PERSONALITY DISORDER (HCC): ICD-10-CM

## 2022-09-29 DIAGNOSIS — F33.1 MODERATE EPISODE OF RECURRENT MAJOR DEPRESSIVE DISORDER (HCC): Primary | ICD-10-CM

## 2022-09-29 DIAGNOSIS — Z76.89 ENCOUNTER TO ESTABLISH CARE: ICD-10-CM

## 2022-09-29 DIAGNOSIS — N92.6 IRREGULAR PERIODS/MENSTRUAL CYCLES: ICD-10-CM

## 2022-09-29 DIAGNOSIS — J45.20 MILD INTERMITTENT ASTHMA, UNSPECIFIED WHETHER COMPLICATED: ICD-10-CM

## 2022-09-29 DIAGNOSIS — Z20.2 EXPOSURE TO SEXUALLY TRANSMITTED DISEASE (STD): ICD-10-CM

## 2022-09-29 LAB
CONTROL: NORMAL
PREGNANCY TEST URINE, POC: NORMAL

## 2022-09-29 PROCEDURE — 4004F PT TOBACCO SCREEN RCVD TLK: CPT | Performed by: REGISTERED NURSE

## 2022-09-29 PROCEDURE — 1111F DSCHRG MED/CURRENT MED MERGE: CPT | Performed by: REGISTERED NURSE

## 2022-09-29 PROCEDURE — 99204 OFFICE O/P NEW MOD 45 MIN: CPT | Performed by: REGISTERED NURSE

## 2022-09-29 PROCEDURE — 81025 URINE PREGNANCY TEST: CPT | Performed by: REGISTERED NURSE

## 2022-09-29 PROCEDURE — G8420 CALC BMI NORM PARAMETERS: HCPCS | Performed by: REGISTERED NURSE

## 2022-09-29 PROCEDURE — G8427 DOCREV CUR MEDS BY ELIG CLIN: HCPCS | Performed by: REGISTERED NURSE

## 2022-09-29 RX ORDER — ALBUTEROL SULFATE 90 UG/1
2 AEROSOL, METERED RESPIRATORY (INHALATION) 4 TIMES DAILY PRN
Qty: 18 G | Refills: 3 | Status: SHIPPED | OUTPATIENT
Start: 2022-09-29

## 2022-09-29 ASSESSMENT — PATIENT HEALTH QUESTIONNAIRE - PHQ9
SUM OF ALL RESPONSES TO PHQ QUESTIONS 1-9: 19
3. TROUBLE FALLING OR STAYING ASLEEP: 3
9. THOUGHTS THAT YOU WOULD BE BETTER OFF DEAD, OR OF HURTING YOURSELF: 1
7. TROUBLE CONCENTRATING ON THINGS, SUCH AS READING THE NEWSPAPER OR WATCHING TELEVISION: 2
1. LITTLE INTEREST OR PLEASURE IN DOING THINGS: 2
SUM OF ALL RESPONSES TO PHQ9 QUESTIONS 1 & 2: 4
SUM OF ALL RESPONSES TO PHQ QUESTIONS 1-9: 18
2. FEELING DOWN, DEPRESSED OR HOPELESS: 2
SUM OF ALL RESPONSES TO PHQ QUESTIONS 1-9: 19
SUM OF ALL RESPONSES TO PHQ QUESTIONS 1-9: 19
5. POOR APPETITE OR OVEREATING: 0
8. MOVING OR SPEAKING SO SLOWLY THAT OTHER PEOPLE COULD HAVE NOTICED. OR THE OPPOSITE, BEING SO FIGETY OR RESTLESS THAT YOU HAVE BEEN MOVING AROUND A LOT MORE THAN USUAL: 3
4. FEELING TIRED OR HAVING LITTLE ENERGY: 3
6. FEELING BAD ABOUT YOURSELF - OR THAT YOU ARE A FAILURE OR HAVE LET YOURSELF OR YOUR FAMILY DOWN: 3

## 2022-09-29 ASSESSMENT — ENCOUNTER SYMPTOMS
GASTROINTESTINAL NEGATIVE: 1
COUGH: 0
WHEEZING: 0
SHORTNESS OF BREATH: 0
EYES NEGATIVE: 1

## 2022-09-29 NOTE — PATIENT INSTRUCTIONS
Make appointment with Psych NP- Malick Garcia NP    Please make an appointment with one of our [facility]ants, Dr. Alejandra Theodore or Corrinne Daub. They will work with you to develop a plan to improve your overall well-being by addressing any behavioral or mental health factors that are influencing your health. You can schedule your appointment just like you schedule your other appointments here, at the  or over the phone. Each appointment will be 30 minutes long, and you will typically be seen every 2-4 weeks. Your insurance should cover the visit, but you may owe a specialist copay. If you would prefer to be seen by a therapist more frequently, or for a longer visit, please ask your Primary Care Provider for a recommendation.   Call [phone] Option 3

## 2022-10-03 RX ORDER — ARIPIPRAZOLE 5 MG/1
5 TABLET ORAL DAILY
Qty: 30 TABLET | Refills: 0 | Status: SHIPPED
Start: 2022-10-03 | End: 2022-10-21 | Stop reason: SINTOL

## 2022-10-03 RX ORDER — BUSPIRONE HYDROCHLORIDE 10 MG/1
10 TABLET ORAL 3 TIMES DAILY
Qty: 90 TABLET | Refills: 0 | Status: SHIPPED | OUTPATIENT
Start: 2022-10-03 | End: 2022-10-21 | Stop reason: SDUPTHER

## 2022-10-03 NOTE — TELEPHONE ENCOUNTER
Pt requesting Refills Sent To:  [facility] [id number] - [place], [place] - [address] - P [phone] - F [phone]    busPIRone (BUSPAR) 10 MG tablet     ARIPiprazole (ABILIFY) 5 MG tablet     Last Office Visit  -  9/29/22  Next Office Visit  -  none

## 2022-10-18 ENCOUNTER — OFFICE VISIT (OUTPATIENT)
Dept: PULMONOLOGY | Age: 32
End: 2022-10-18
Payer: COMMERCIAL

## 2022-10-18 VITALS
RESPIRATION RATE: 16 BRPM | HEART RATE: 96 BPM | BODY MASS INDEX: 19.32 KG/M2 | SYSTOLIC BLOOD PRESSURE: 103 MMHG | HEIGHT: 66 IN | WEIGHT: 120.2 LBS | TEMPERATURE: 98.1 F | DIASTOLIC BLOOD PRESSURE: 70 MMHG | OXYGEN SATURATION: 97 %

## 2022-10-18 DIAGNOSIS — Z91.89 AT RISK FOR NARCOLEPSY: ICD-10-CM

## 2022-10-18 DIAGNOSIS — G25.81 RESTLESS LEGS SYNDROME: ICD-10-CM

## 2022-10-18 DIAGNOSIS — G47.30 SLEEP APNEA, UNSPECIFIED TYPE: Primary | ICD-10-CM

## 2022-10-18 DIAGNOSIS — G47.00 INSOMNIA, UNSPECIFIED TYPE: ICD-10-CM

## 2022-10-18 PROCEDURE — 4004F PT TOBACCO SCREEN RCVD TLK: CPT | Performed by: NURSE PRACTITIONER

## 2022-10-18 PROCEDURE — G8427 DOCREV CUR MEDS BY ELIG CLIN: HCPCS | Performed by: NURSE PRACTITIONER

## 2022-10-18 PROCEDURE — G8420 CALC BMI NORM PARAMETERS: HCPCS | Performed by: NURSE PRACTITIONER

## 2022-10-18 PROCEDURE — 99203 OFFICE O/P NEW LOW 30 MIN: CPT | Performed by: NURSE PRACTITIONER

## 2022-10-18 PROCEDURE — G8484 FLU IMMUNIZE NO ADMIN: HCPCS | Performed by: NURSE PRACTITIONER

## 2022-10-18 ASSESSMENT — SLEEP AND FATIGUE QUESTIONNAIRES
HOW LIKELY ARE YOU TO NOD OFF OR FALL ASLEEP IN A CAR, WHILE STOPPED FOR A FEW MINUTES IN TRAFFIC: 1
HOW LIKELY ARE YOU TO NOD OFF OR FALL ASLEEP WHEN YOU ARE A PASSENGER IN A CAR FOR AN HOUR WITHOUT A BREAK: 0
NECK CIRCUMFERENCE (INCHES): 11
HOW LIKELY ARE YOU TO NOD OFF OR FALL ASLEEP WHILE SITTING INACTIVE IN A PUBLIC PLACE: 0
HOW LIKELY ARE YOU TO NOD OFF OR FALL ASLEEP WHILE SITTING AND TALKING TO SOMEONE: 0
ESS TOTAL SCORE: 2
HOW LIKELY ARE YOU TO NOD OFF OR FALL ASLEEP WHILE SITTING AND READING: 1
HOW LIKELY ARE YOU TO NOD OFF OR FALL ASLEEP WHILE LYING DOWN TO REST IN THE AFTERNOON WHEN CIRCUMSTANCES PERMIT: 0
HOW LIKELY ARE YOU TO NOD OFF OR FALL ASLEEP WHILE SITTING QUIETLY AFTER LUNCH WITHOUT ALCOHOL: 0
HOW LIKELY ARE YOU TO NOD OFF OR FALL ASLEEP WHILE WATCHING TV: 0

## 2022-10-18 ASSESSMENT — ENCOUNTER SYMPTOMS
CHEST TIGHTNESS: 0
SHORTNESS OF BREATH: 1
WHEEZING: 1
VOMITING: 0
RHINORRHEA: 0
SORE THROAT: 0
ABDOMINAL PAIN: 0
NAUSEA: 0
COUGH: 0
DIARRHEA: 0
EYE PAIN: 0

## 2022-10-18 NOTE — PROGRESS NOTES
MA Communication: The following orders are received by verbal communication from Misael Garrido MD    Orders include:  Pf will call to schedule her appt once she had the dates secure for her sleep study.
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
## 2022-10-18 NOTE — PATIENT INSTRUCTIONS
Phillip Baez will be scheduled for polysomnography  and MSLT in order to evaluate for the presence and severity of sleep apnea and possible narcolepsy with hx. She was given a discussion of the pathophysiology, evaluation and treatment of apnea and narcolepsy. Thyroid function tests were normal in 8/2022. Advised to avoid driving when too sleepy to function safely. Discussed the risks of untreated apnea such as accidents, cognitive impairment, mood impairment, high blood pressure, various cardiac diseases and stroke. Follow up in office with Dr. Nikki Robb- Call office as soon as testing is scheduled and will make a follow up appointment to see Dr. Nikki Robb to review the results. Someone from the [facility] will call to schedule. If you have not heard from them in 5 or more days you may call [phone] and mague with someone to schedule.

## 2022-10-21 ENCOUNTER — OFFICE VISIT (OUTPATIENT)
Dept: PSYCHIATRY | Age: 32
End: 2022-10-21

## 2022-10-21 DIAGNOSIS — F43.10 COMPLEX POSTTRAUMATIC STRESS DISORDER: ICD-10-CM

## 2022-10-21 DIAGNOSIS — F33.2 SEVERE EPISODE OF RECURRENT MAJOR DEPRESSIVE DISORDER, WITHOUT PSYCHOTIC FEATURES (HCC): Primary | ICD-10-CM

## 2022-10-21 DIAGNOSIS — F12.10 CANNABIS ABUSE: ICD-10-CM

## 2022-10-21 PROCEDURE — G8484 FLU IMMUNIZE NO ADMIN: HCPCS | Performed by: NURSE PRACTITIONER

## 2022-10-21 PROCEDURE — G8427 DOCREV CUR MEDS BY ELIG CLIN: HCPCS | Performed by: NURSE PRACTITIONER

## 2022-10-21 PROCEDURE — 99205 OFFICE O/P NEW HI 60 MIN: CPT | Performed by: NURSE PRACTITIONER

## 2022-10-21 PROCEDURE — G8420 CALC BMI NORM PARAMETERS: HCPCS | Performed by: NURSE PRACTITIONER

## 2022-10-21 PROCEDURE — 4004F PT TOBACCO SCREEN RCVD TLK: CPT | Performed by: NURSE PRACTITIONER

## 2022-10-21 RX ORDER — QUETIAPINE FUMARATE 25 MG/1
TABLET, FILM COATED ORAL
Qty: 60 TABLET | Refills: 0 | Status: SHIPPED | OUTPATIENT
Start: 2022-10-21

## 2022-10-21 RX ORDER — BUSPIRONE HYDROCHLORIDE 10 MG/1
10 TABLET ORAL 3 TIMES DAILY
Qty: 90 TABLET | Refills: 0 | Status: SHIPPED | OUTPATIENT
Start: 2022-10-21

## 2022-10-21 NOTE — Clinical Note
Corby Dowling, please call IOP and refer her. Also follow up within 3-4 weeks, genesight swab completed. Needs to be sent.  thanks

## 2022-10-23 ASSESSMENT — PATIENT HEALTH QUESTIONNAIRE - PHQ9
SUM OF ALL RESPONSES TO PHQ QUESTIONS 1-9: 21
6. FEELING BAD ABOUT YOURSELF - OR THAT YOU ARE A FAILURE OR HAVE LET YOURSELF OR YOUR FAMILY DOWN: 3
10. IF YOU CHECKED OFF ANY PROBLEMS, HOW DIFFICULT HAVE THESE PROBLEMS MADE IT FOR YOU TO DO YOUR WORK, TAKE CARE OF THINGS AT HOME, OR GET ALONG WITH OTHER PEOPLE: 2
4. FEELING TIRED OR HAVING LITTLE ENERGY: 3
2. FEELING DOWN, DEPRESSED OR HOPELESS: 2
5. POOR APPETITE OR OVEREATING: 1
SUM OF ALL RESPONSES TO PHQ QUESTIONS 1-9: 21
8. MOVING OR SPEAKING SO SLOWLY THAT OTHER PEOPLE COULD HAVE NOTICED. OR THE OPPOSITE, BEING SO FIGETY OR RESTLESS THAT YOU HAVE BEEN MOVING AROUND A LOT MORE THAN USUAL: 3
9. THOUGHTS THAT YOU WOULD BE BETTER OFF DEAD, OR OF HURTING YOURSELF: 2
3. TROUBLE FALLING OR STAYING ASLEEP: 3
SUM OF ALL RESPONSES TO PHQ9 QUESTIONS 1 & 2: 4
7. TROUBLE CONCENTRATING ON THINGS, SUCH AS READING THE NEWSPAPER OR WATCHING TELEVISION: 2
SUM OF ALL RESPONSES TO PHQ QUESTIONS 1-9: 19
SUM OF ALL RESPONSES TO PHQ QUESTIONS 1-9: 21
1. LITTLE INTEREST OR PLEASURE IN DOING THINGS: 2

## 2022-10-23 ASSESSMENT — ANXIETY QUESTIONNAIRES
3. WORRYING TOO MUCH ABOUT DIFFERENT THINGS: 3
6. BECOMING EASILY ANNOYED OR IRRITABLE: 3
2. NOT BEING ABLE TO STOP OR CONTROL WORRYING: 3
IF YOU CHECKED OFF ANY PROBLEMS ON THIS QUESTIONNAIRE, HOW DIFFICULT HAVE THESE PROBLEMS MADE IT FOR YOU TO DO YOUR WORK, TAKE CARE OF THINGS AT HOME, OR GET ALONG WITH OTHER PEOPLE: VERY DIFFICULT
1. FEELING NERVOUS, ANXIOUS, OR ON EDGE: 3
7. FEELING AFRAID AS IF SOMETHING AWFUL MIGHT HAPPEN: 3
GAD7 TOTAL SCORE: 21
5. BEING SO RESTLESS THAT IT IS HARD TO SIT STILL: 3
4. TROUBLE RELAXING: 3

## 2022-10-23 ASSESSMENT — COLUMBIA-SUICIDE SEVERITY RATING SCALE - C-SSRS
1. WITHIN THE PAST MONTH, HAVE YOU WISHED YOU WERE DEAD OR WISHED YOU COULD GO TO SLEEP AND NOT WAKE UP?: YES
6. HAVE YOU EVER DONE ANYTHING, STARTED TO DO ANYTHING, OR PREPARED TO DO ANYTHING TO END YOUR LIFE?: NO
2. HAVE YOU ACTUALLY HAD ANY THOUGHTS OF KILLING YOURSELF?: NO
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
## 2022-10-25 ENCOUNTER — PATIENT MESSAGE (OUTPATIENT)
Dept: PSYCHIATRY | Age: 32
End: 2022-10-25

## 2022-10-26 ENCOUNTER — PATIENT MESSAGE (OUTPATIENT)
Dept: PSYCHIATRY | Age: 32
End: 2022-10-26

## 2022-10-26 ENCOUNTER — OFFICE VISIT (OUTPATIENT)
Dept: PSYCHOLOGY | Age: 32
End: 2022-10-26
Payer: COMMERCIAL

## 2022-10-26 DIAGNOSIS — F43.10 PTSD (POST-TRAUMATIC STRESS DISORDER): Primary | ICD-10-CM

## 2022-10-26 PROCEDURE — 4004F PT TOBACCO SCREEN RCVD TLK: CPT | Performed by: PSYCHOLOGIST

## 2022-10-26 PROCEDURE — 90791 PSYCH DIAGNOSTIC EVALUATION: CPT | Performed by: PSYCHOLOGIST

## 2022-10-26 ASSESSMENT — ANXIETY QUESTIONNAIRES
GAD7 TOTAL SCORE: 18
5. BEING SO RESTLESS THAT IT IS HARD TO SIT STILL: 3-NEARLY EVERY DAY
6. BECOMING EASILY ANNOYED OR IRRITABLE: 3-NEARLY EVERY DAY
4. TROUBLE RELAXING: 3-NEARLY EVERY DAY
3. WORRYING TOO MUCH ABOUT DIFFERENT THINGS: 2-OVER HALF THE DAYS
1. FEELING NERVOUS, ANXIOUS, OR ON EDGE: 2
2. NOT BEING ABLE TO STOP OR CONTROL WORRYING: 2-OVER HALF THE DAYS
7. FEELING AFRAID AS IF SOMETHING AWFUL MIGHT HAPPEN: 3-NEARLY EVERY DAY

## 2022-10-26 ASSESSMENT — PATIENT HEALTH QUESTIONNAIRE - PHQ9
SUM OF ALL RESPONSES TO PHQ9 QUESTIONS 1 & 2: 4
8. MOVING OR SPEAKING SO SLOWLY THAT OTHER PEOPLE COULD HAVE NOTICED. OR THE OPPOSITE, BEING SO FIGETY OR RESTLESS THAT YOU HAVE BEEN MOVING AROUND A LOT MORE THAN USUAL: 2
SUM OF ALL RESPONSES TO PHQ QUESTIONS 1-9: 15
SUM OF ALL RESPONSES TO PHQ QUESTIONS 1-9: 17
1. LITTLE INTEREST OR PLEASURE IN DOING THINGS: 2
3. TROUBLE FALLING OR STAYING ASLEEP: 3
7. TROUBLE CONCENTRATING ON THINGS, SUCH AS READING THE NEWSPAPER OR WATCHING TELEVISION: 2
9. THOUGHTS THAT YOU WOULD BE BETTER OFF DEAD, OR OF HURTING YOURSELF: 2
4. FEELING TIRED OR HAVING LITTLE ENERGY: 2
5. POOR APPETITE OR OVEREATING: 0
SUM OF ALL RESPONSES TO PHQ QUESTIONS 1-9: 17
10. IF YOU CHECKED OFF ANY PROBLEMS, HOW DIFFICULT HAVE THESE PROBLEMS MADE IT FOR YOU TO DO YOUR WORK, TAKE CARE OF THINGS AT HOME, OR GET ALONG WITH OTHER PEOPLE: 2
6. FEELING BAD ABOUT YOURSELF - OR THAT YOU ARE A FAILURE OR HAVE LET YOURSELF OR YOUR FAMILY DOWN: 2
2. FEELING DOWN, DEPRESSED OR HOPELESS: 2
SUM OF ALL RESPONSES TO PHQ QUESTIONS 1-9: 17

## 2022-10-26 ASSESSMENT — COLUMBIA-SUICIDE SEVERITY RATING SCALE - C-SSRS
2. HAVE YOU ACTUALLY HAD ANY THOUGHTS OF KILLING YOURSELF?: NO
6. HAVE YOU EVER DONE ANYTHING, STARTED TO DO ANYTHING, OR PREPARED TO DO ANYTHING TO END YOUR LIFE?: NO
1. WITHIN THE PAST MONTH, HAVE YOU WISHED YOU WERE DEAD OR WISHED YOU COULD GO TO SLEEP AND NOT WAKE UP?: NO
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
## 2022-10-26 NOTE — TELEPHONE ENCOUNTER
From: Jerald Sánchez  To: [id number]  Sent: 10/26/2022 6:51 AM EDT  Subject: Positive pregnancy test    I am pregnant now an not comfortable taking the medicine. I'm apologize for all the messages.

## 2022-10-26 NOTE — PROGRESS NOTES
Behavioral Health Consultation  Rafi Ashley, Ph.D.  Psychologist  10/26/2022  9:12 AM EDT      Time spent with Patient: *** minutes  This is patient's first [facility] appointment. Reason for Consult:    Chief Complaint   Patient presents with    Depression    Anxiety     Referring Provider: Corey Messina, APRN - CNP  [address] [place],  [address],  [address]    Pt provided informed consent for the behavioral health program. Discussed with patient model of service to include the limits of confidentiality (i.e. abuse reporting, suicide intervention, etc.) and short-term intervention focused approach. Pt indicated understanding. Feedback given to PCP. S:  Patient presents with concerns about ***. Sx have been present for *** (weeks, months, years). Sx are aggravated by ***. Patient finds relief from ***. Goals for treatment include ***. Patient lives with ***. Patient works ***. Daily routine is ***. Daily caffeine use ***. *** cigarette use, *** alcohol use, *** illegal drug use. Patient spends *** hours a day on social media or watching TV. There is *** regular exercise. Patient describes *** sleep pattern. Social support is ***. Patient identifies with *** (Adventism/spirituality/culture). Patient enjoys the following hobbies: ***.  Family history is positive for ***.     O:  MSE:    Appearance: {MSE Appearance:47502::\"good hygiene \"}  Attitude: {MSE Attitude:84962::\"cooperative\",\"friendly\"}  Consciousness: {MSE Consciousness:11782::\"alert\"}  Orientation: {MSE Orientation List:70053::\"oriented to person, place, time, general circumstance\"}  Memory: {MSE Memory List:41046::\"recent and remote memory intact\"}  Attention/Concentration: {MSE Attention/Concentration:86710::\"intact during session\"}  Psychomotor Activity:{MSE Psychomotor Activity :03542::\"normal\"}  Eye Contact: {MSE Eye Contact:00285::\"normal\"}  Speech: {MSE Speech :05058::\"normal rate and volume, well-articulated\"}  Mood: ***  Affect: {MSE Affect :47140::\"euthymic\"}  Perception: {MSE Perception:45549::\"within normal limits\"}  Thought Content: {MSE Thought Content List:49341::\"within normal limits\"}  Thought Process: {MSE Thought Process List:57456::\"logical, coherent\",\"goal-directed\"}  Insight: {MSE Insight List :98950::\"good\"}  Judgment: {MSE Judgment :49116::\"intact\"}  Ability to understand instructions: {MSE Ability to understand instructions :73458::\"Yes\"}  Ability to respond meaningfully: {MSE Ability to respond meaningfully:42577::\"Yes\"}  Morbid Ideation: {MSE Morbid Ideation:00524::\"no \"}  Suicide Assessment: {[facility] Suicide Assessment :79246::\"no suicidal ideation, plan, or intent\"}  Homicidal Ideation: {MSE Homicidal Ideation:50219::\"no\"}    History:    Medications:   Current Outpatient Medications   Medication Sig Dispense Refill    QUEtiapine (SEROQUEL) 25 MG tablet Take one tablet at bedtime and 0.5 tablet in the morning. 60 tablet 0    busPIRone (BUSPAR) 10 MG tablet Take 1 tablet by mouth 3 times daily 90 tablet 0    albuterol sulfate HFA (VENTOLIN HFA) 108 (90 Base) MCG/ACT inhaler Inhale 2 puffs into the lungs 4 times daily as needed for Wheezing or Shortness of Breath 18 g 3     No current facility-administered medications for this visit.      Social History:   Social History     Socioeconomic History    Marital status: Single     Spouse name: Not on file    Number of children: 1    Years of education: 12    Highest education level: Not on file   Occupational History     Employer: [employer]   Tobacco Use    Smoking status: Every Day     Packs/day: 1.00     Types: Cigarettes    Smokeless tobacco: Never   Vaping Use    Vaping Use: Never used   Substance and Sexual Activity    Alcohol use: Not Currently     Comment: rare    Drug use: Yes     Types: Marijuana Elza Pleas)     Comment: daily    Sexual activity: Yes     Partners: Male   Other Topics Concern    Not on file   Social History Narrative    Not on file     Social Determinants of Health Financial Resource Strain: Not on file   Food Insecurity: Not on file   Transportation Needs: Not on file   Physical Activity: Not on file   Stress: Not on file   Social Connections: Not on file   Intimate Partner Violence: Not on file   Housing Stability: Not on file     TOBACCO:   reports that she has been smoking cigarettes. She has been smoking an average of 1 pack per day. She has never used smokeless tobacco.  ETOH:   reports that she does not currently use alcohol. Family History:   Family History   Problem Relation Age of Onset    Mental Illness Mother        A:  Administered PHQ-9 and TANYA-7 (see below). Patient endorses *** symptoms of depression and *** symptoms of anxiety. *** SI ***. Diagnosis:    No diagnosis found.       Diagnosis Date    Anxiety     Asthma     Depression     IBS (irritable bowel syndrome)     Migraine     Tachycardia     causes fainting       Plan:  Pt interventions:  {mental health remedies:48022}    Pt Behavioral Change Plan:   See Pt Instructions

## 2022-11-01 ENCOUNTER — FOLLOWUP TELEPHONE ENCOUNTER (OUTPATIENT)
Dept: PSYCHIATRY | Age: 32
End: 2022-11-01

## 2022-11-01 NOTE — TELEPHONE ENCOUNTER
Pt called requesting to be admitted into the IOP program. Writer spoke with Dr. Cassidy Arteaga and the therapy team who declined for pt to attend d/t our program not being appropriate for the pt's needs at this time. Writer explained this to the pt and gave multiple resources for pt to get connected with OP care. Pt stated she was pregnant and not taking her medications.

## 2022-11-11 ENCOUNTER — OFFICE VISIT (OUTPATIENT)
Dept: PSYCHIATRY | Age: 32
End: 2022-11-11
Payer: COMMERCIAL

## 2022-11-11 VITALS — SYSTOLIC BLOOD PRESSURE: 100 MMHG | WEIGHT: 121.8 LBS | BODY MASS INDEX: 19.66 KG/M2 | DIASTOLIC BLOOD PRESSURE: 60 MMHG

## 2022-11-11 DIAGNOSIS — F33.2 MDD (MAJOR DEPRESSIVE DISORDER), RECURRENT SEVERE, WITHOUT PSYCHOSIS (HCC): Primary | ICD-10-CM

## 2022-11-11 DIAGNOSIS — F44.81 DISSOCIATIVE IDENTITY DISORDER (HCC): ICD-10-CM

## 2022-11-11 DIAGNOSIS — F43.10 COMPLEX POSTTRAUMATIC STRESS DISORDER: ICD-10-CM

## 2022-11-11 PROCEDURE — G8484 FLU IMMUNIZE NO ADMIN: HCPCS | Performed by: NURSE PRACTITIONER

## 2022-11-11 PROCEDURE — G8427 DOCREV CUR MEDS BY ELIG CLIN: HCPCS | Performed by: NURSE PRACTITIONER

## 2022-11-11 PROCEDURE — 99215 OFFICE O/P EST HI 40 MIN: CPT | Performed by: NURSE PRACTITIONER

## 2022-11-11 PROCEDURE — 4004F PT TOBACCO SCREEN RCVD TLK: CPT | Performed by: NURSE PRACTITIONER

## 2022-11-11 PROCEDURE — G8420 CALC BMI NORM PARAMETERS: HCPCS | Performed by: NURSE PRACTITIONER

## 2022-11-11 RX ORDER — FLUOXETINE 10 MG/1
TABLET, FILM COATED ORAL
Qty: 30 TABLET | Refills: 3 | Status: SHIPPED | OUTPATIENT
Start: 2022-11-11

## 2022-11-11 ASSESSMENT — PATIENT HEALTH QUESTIONNAIRE - PHQ9
2. FEELING DOWN, DEPRESSED OR HOPELESS: 3
8. MOVING OR SPEAKING SO SLOWLY THAT OTHER PEOPLE COULD HAVE NOTICED. OR THE OPPOSITE, BEING SO FIGETY OR RESTLESS THAT YOU HAVE BEEN MOVING AROUND A LOT MORE THAN USUAL: 3
7. TROUBLE CONCENTRATING ON THINGS, SUCH AS READING THE NEWSPAPER OR WATCHING TELEVISION: 2
9. THOUGHTS THAT YOU WOULD BE BETTER OFF DEAD, OR OF HURTING YOURSELF: 3
SUM OF ALL RESPONSES TO PHQ QUESTIONS 1-9: 21
SUM OF ALL RESPONSES TO PHQ QUESTIONS 1-9: 21
SUM OF ALL RESPONSES TO PHQ9 QUESTIONS 1 & 2: 4
6. FEELING BAD ABOUT YOURSELF - OR THAT YOU ARE A FAILURE OR HAVE LET YOURSELF OR YOUR FAMILY DOWN: 3
1. LITTLE INTEREST OR PLEASURE IN DOING THINGS: 1
SUM OF ALL RESPONSES TO PHQ QUESTIONS 1-9: 21
4. FEELING TIRED OR HAVING LITTLE ENERGY: 3
5. POOR APPETITE OR OVEREATING: 0
3. TROUBLE FALLING OR STAYING ASLEEP: 3
SUM OF ALL RESPONSES TO PHQ QUESTIONS 1-9: 18

## 2022-11-11 ASSESSMENT — ANXIETY QUESTIONNAIRES
1. FEELING NERVOUS, ANXIOUS, OR ON EDGE: 3
GAD7 TOTAL SCORE: 21
3. WORRYING TOO MUCH ABOUT DIFFERENT THINGS: 3
2. NOT BEING ABLE TO STOP OR CONTROL WORRYING: 3
5. BEING SO RESTLESS THAT IT IS HARD TO SIT STILL: 3
7. FEELING AFRAID AS IF SOMETHING AWFUL MIGHT HAPPEN: 3
4. TROUBLE RELAXING: 3
6. BECOMING EASILY ANNOYED OR IRRITABLE: 3

## 2022-11-11 NOTE — PROGRESS NOTES
Patient is here following up in person today, and would like to address depression. Patient is currently Pregnant.      PHQ: 21  TANYA: 21
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
## 2022-11-11 NOTE — Clinical Note
Please call to check on her and see if she wants to follow up with me if she is trying the prozac. thanks

## 2022-11-18 ENCOUNTER — PATIENT MESSAGE (OUTPATIENT)
Dept: PSYCHIATRY | Age: 32
End: 2022-11-18

## 2022-11-20 NOTE — TELEPHONE ENCOUNTER
From: Yani Gallegos  To: Darryn Chavez  Sent: 11/18/2022 4:32 PM EST  Subject: Follow up    I'm not comfortable taking the prozac. But I have been taking the buspar an it's been helping.

## 2022-11-22 NOTE — PROGRESS NOTES
(FYI) I scheduled her a bit earlier on 11.30.22 to discuss other options. Her OB is not on board with taking Prozac.

## 2022-11-27 RX ORDER — BUSPIRONE HYDROCHLORIDE 10 MG/1
10 TABLET ORAL 3 TIMES DAILY
Qty: 90 TABLET | Refills: 0 | Status: SHIPPED | OUTPATIENT
Start: 2022-11-27

## 2022-11-28 ENCOUNTER — OFFICE VISIT (OUTPATIENT)
Dept: PSYCHOLOGY | Age: 32
End: 2022-11-28

## 2022-11-28 DIAGNOSIS — F43.10 PTSD (POST-TRAUMATIC STRESS DISORDER): Primary | ICD-10-CM

## 2022-11-28 NOTE — PROGRESS NOTES
Behavioral Health Consultation  Ryan Nicholas, Ph.D.  Psychologist  11/28/2022  11:36 AM EST      Time spent with Patient: 25 minutes  This is patient's second [facility] appointment. Reason for Consult:    Chief Complaint   Patient presents with    Depression    Anxiety     Referring Provider: Linda Becker, APRN - CNP  [address] [place],  [address]    Pt provided informed consent for the behavioral health program. Discussed with patient model of service to include the limits of confidentiality (i.e. abuse reporting, suicide intervention, etc.) and short-term intervention focused approach. Pt indicated understanding. Feedback given to PCP. TELEHEALTH VISIT -- Audio/Visual (During MAPAU-03 public health emergency)    Pursuant to the emergency declaration under the [address] [place] [address], [facility] waiver authority and the [employer] Act, this Virtual Visit was conducted, with patient's consent, to reduce the patient's risk of exposure to COVID-19 and provide continuity of care for an established patient. Services were provided through a video synchronous discussion virtually to substitute for in-person clinic visit. Pt gave verbal informed consent to participate in telehealth services. Conducted a risk-benefit analysis and determined that the patient's presenting problems are consistent with the use of telepsychology. Determined that the patient has sufficient knowledge and skills in the use of technology enabling them to adequately benefit from telepsychology. It was determined that this patient was able to be properly treated without an in-person session. Patient verified that they were currently located at the [facility] address that was provided during registration.     Verified the following information:  Patient's identification: Yes  Patient location: [address] [place] [id number]   Patient's call back number: [phone]   Patient's emergency contact's name and number, as well as permission to contact them if needed: Extended Emergency Contact Information  Primary Emergency Contact: Chris Salgado  Address: [address]           [place], [address] Phone: [phone]  Mobile Phone: [phone]  Relation: None     Provider location: [address]:  Patient reported that she has been feeling more depressed. Is taking Buspar, but does not notice any benefit. She is still having difficulty in her relationship. Feels unsupported by her partner, \"unlovable. \" Processed dynamic. They are attending couples therapy weekly, which has been helpful. She expressed frustration that she was making progress in her life, but now feels that she has taken a step or two backwards. Patient admitted that she feels \"unbalanced. \" Is interested in considering medication, but does not want to take medication while pregnant. Discussed ambivalence about medication. Wants to feel better, but also wants to be conservative about prenatal exposure. She also plans to breastfeed. She described feeling hopeless and lonely. Acknowledges that pregnancy hormones and symptoms are not helping.        O:  MSE:    Appearance: good hygiene   Attitude: cooperative and friendly  Consciousness: alert  Orientation: oriented to person, place, time, general circumstance  Memory: recent and remote memory intact  Attention/Concentration: intact during session  Psychomotor Activity:normal  Eye Contact: normal  Speech: normal rate and volume, well-articulated  Mood: depressed, anxious  Affect: dysphoric  Perception: within normal limits  Thought Content: all-or-none thinking and intrusive thoughts  Thought Process: logical, coherent and goal-directed  Insight: good  Judgment: intact  Ability to understand instructions: Yes  Ability to respond meaningfully: Yes  Morbid Ideation: passive thoughts of death  Suicide Assessment: no suicidal ideation, plan, or intent  Homicidal Ideation: no    History:    Medications:   Current Outpatient Medications   Medication Sig Dispense Refill    busPIRone (BUSPAR) 10 MG tablet Take 1 tablet by mouth 3 times daily 90 tablet 0    FLUoxetine (PROZAC) 10 MG tablet Take 0.5 tablet daily for two weeks and then increase to one whole tablet daily. 30 tablet 3    albuterol sulfate HFA (VENTOLIN HFA) 108 (90 Base) MCG/ACT inhaler Inhale 2 puffs into the lungs 4 times daily as needed for Wheezing or Shortness of Breath 18 g 3     No current facility-administered medications for this visit. Social History:   Social History     Socioeconomic History    Marital status: Single     Spouse name: Not on file    Number of children: 1    Years of education: 12    Highest education level: Not on file   Occupational History     Employer: [employer]   Tobacco Use    Smoking status: Every Day     Packs/day: 1.00     Types: Cigarettes    Smokeless tobacco: Never   Vaping Use    Vaping Use: Never used   Substance and Sexual Activity    Alcohol use: Not Currently     Comment: rare    Drug use: Yes     Types: Marijuana Shira Hermelindo)     Comment: daily    Sexual activity: Yes     Partners: Male   Other Topics Concern    Not on file   Social History Narrative    Not on file     Social Determinants of Health     Financial Resource Strain: Not on file   Food Insecurity: Not on file   Transportation Needs: Not on file   Physical Activity: Not on file   Stress: Not on file   Social Connections: Not on file   Intimate Partner Violence: Not on file   Housing Stability: Not on file     TOBACCO:   reports that she has been smoking cigarettes. She has been smoking an average of 1 pack per day. She has never used smokeless tobacco.  ETOH:   reports that she does not currently use alcohol. Family History:   Family History   Problem Relation Age of Onset    Mental Illness Mother      A:  Patient engaged and cooperative.  Patient noted vague and passive thoughts of wishing she was no longer alive, but adamantly denied suicidal intent or plan. Insight and motivation are good. Diagnosis:    1. PTSD (post-traumatic stress disorder)          Diagnosis Date    Anxiety     Asthma     Depression     IBS (irritable bowel syndrome)     Migraine     Tachycardia     causes fainting     Plan:  Pt interventions:  Developed Crisis Response Plan, Conducted functional assessment, [place]-setting to identify pt's primary goals for [facility] visit / overall health, Supportive techniques, and Emphasized self-care as important for managing overall health.     Pt Behavioral Change Plan:   See Pt Instructions

## 2022-11-28 NOTE — PATIENT INSTRUCTIONS
Remember these are ways you can control your mood:  Sleep/rest  Spend time with son  Take the dog for a walk

## 2023-01-03 ENCOUNTER — TELEMEDICINE (OUTPATIENT)
Dept: PSYCHOLOGY | Age: 33
End: 2023-01-03
Payer: COMMERCIAL

## 2023-01-03 DIAGNOSIS — F43.10 PTSD (POST-TRAUMATIC STRESS DISORDER): Primary | ICD-10-CM

## 2023-01-03 PROCEDURE — 90832 PSYTX W PT 30 MINUTES: CPT | Performed by: PSYCHOLOGIST

## 2023-01-03 NOTE — PROGRESS NOTES
Behavioral Health Consultation  Elliott Moore, Ph.D.  Psychologist  1/3/2023  9:20 AM EST      Time spent with Patient: 20 minutes  This is patient's third [facility] appointment. Reason for Consult:    Chief Complaint   Patient presents with    Depression    Anxiety     Referring Provider: Tori Glass, APRN - CNP  [address],  [address]    Pt provided informed consent for the behavioral health program. Discussed with patient model of service to include the limits of confidentiality (i.e. abuse reporting, suicide intervention, etc.) and short-term intervention focused approach. Pt indicated understanding. Feedback given to PCP. TELEHEALTH VISIT -- Audio/Visual (During [id number]-84 public health emergency)    Pursuant to the emergency declaration under the [facility] waiver authority and the [employer] Act, this Virtual Visit was conducted, with patient's consent, to reduce the patient's risk of exposure to COVID-19 and provide continuity of care for an established patient. Services were provided through a video synchronous discussion virtually to substitute for in-person clinic visit. Pt gave verbal informed consent to participate in telehealth services. Conducted a risk-benefit analysis and determined that the patient's presenting problems are consistent with the use of telepsychology. Determined that the patient has sufficient knowledge and skills in the use of technology enabling them to adequately benefit from telepsychology. It was determined that this patient was able to be properly treated without an in-person session. Patient verified that they were currently located at the [facility] address that was provided during registration.     Verified the following information:  Patient's identification: Yes  Patient location: [address] [place] [address] [address]  [facility] [place] [address]   Patient's call back number: [phone]   Patient's emergency contact's name and number, as well as permission to contact them if needed: Extended Emergency Contact Information  Primary Emergency Contact: Chris Salgado  Address: [address], Dmitri E Nydia Shankar [address] Phone: [phone]  Mobile Phone: [phone]  Relation: None     Provider location: [address]:  Patient reported that she has been feeling tired, nauseated most days. Is still taking Buspar BID, which helps her feel better able to manage her moods. She is also using a journal, practicing mindfulness when she can. Has also focused on rest and gratitude. Admits that her relationship with her partner is still \"up and down,\" but they have been communicating more clearly which is helpful for her. Praised patient for managing her moods more actively. She identified some black and white thinking, which contributes to her depression. Reviewed strategies for challenging negative automatic thoughts. Also discussed maladaptive patterns in her relationships.     O:  MSE:    Appearance: good hygiene   Attitude: cooperative and friendly  Consciousness: alert  Orientation: oriented to person, place, time, general circumstance  Memory: recent and remote memory intact  Attention/Concentration: intact during session  Psychomotor Activity:normal  Eye Contact: normal  Speech: normal rate and volume, well-articulated  Mood: depressed  Affect: flat  Perception: within normal limits  Thought Content: all-or-none thinking and intrusive thoughts  Thought Process: logical, coherent and goal-directed  Insight: good  Judgment: intact  Ability to understand instructions: Yes  Ability to respond meaningfully: Yes  Morbid Ideation: passive thoughts of death  Suicide Assessment: no suicidal ideation, plan, or intent  Homicidal Ideation: no    History:    Medications:   Current Outpatient Medications   Medication Sig Dispense Refill    busPIRone (BUSPAR) 10 MG tablet Take 1 tablet by mouth 3 times daily 90 tablet 0    FLUoxetine (PROZAC) 10 MG tablet Take 0.5 tablet daily for two weeks and then increase to one whole tablet daily. 30 tablet 3    albuterol sulfate HFA (VENTOLIN HFA) 108 (90 Base) MCG/ACT inhaler Inhale 2 puffs into the lungs 4 times daily as needed for Wheezing or Shortness of Breath 18 g 3     No current facility-administered medications for this visit. Social History:   Social History     Socioeconomic History    Marital status: Single     Spouse name: Not on file    Number of children: 1    Years of education: 12    Highest education level: Not on file   Occupational History     Employer: [employer]   Tobacco Use    Smoking status: Every Day     Packs/day: 1.00     Types: Cigarettes    Smokeless tobacco: Never   Vaping Use    Vaping Use: Never used   Substance and Sexual Activity    Alcohol use: Not Currently     Comment: rare    Drug use: Yes     Types: Marijuana Charmayne Stafrank)     Comment: daily    Sexual activity: Yes     Partners: Male   Other Topics Concern    Not on file   Social History Narrative    Not on file     Social Determinants of Health     Financial Resource Strain: Not on file   Food Insecurity: Not on file   Transportation Needs: Not on file   Physical Activity: Not on file   Stress: Not on file   Social Connections: Not on file   Intimate Partner Violence: Not on file   Housing Stability: Not on file     TOBACCO:   reports that she has been smoking cigarettes. She has been smoking an average of 1 pack per day. She has never used smokeless tobacco.  ETOH:   reports that she does not currently use alcohol. Family History:   Family History   Problem Relation Age of Onset    Mental Illness Mother      A:  Patient engaged and cooperative. Denies SI, but has passive thoughts of death. \"I can't wait to die to be out of this misery. \" Insight and motivation are good. Diagnosis:    1.  PTSD (post-traumatic stress disorder)          Diagnosis Date Anxiety     Asthma     Depression     IBS (irritable bowel syndrome)     Migraine     Tachycardia     causes fainting     Plan:  Pt interventions:  Discussed and set plan for behavioral activation, Conducted functional assessment, Placida-setting to identify pt's primary goals for [facility] visit / overall health, Supportive techniques, and Emphasized self-care as important for managing overall health.     Pt Behavioral Change Plan:   See Pt Instructions

## 2023-01-03 NOTE — PATIENT INSTRUCTIONS
Review handouts about negative automatic thoughts and strategies for challenging them. NEGATIVE AUTOMATIC THOUGHTS    1. Mind reading: You assume that you know what people think without having sufficient evidence of their thoughts. \"He thinks I'm a loser. \"  2. Fortune telling: You predict the future- that things will get worse or that there is danger ahead. \"I'll fail that exam\" and \"I won't get the job. \"  3. Catastrophizing: You believe that what has happened or will happen will be so awful and unbearable that you won't be able to stand it. \"It would be terrible if I failed. \"  4. Labeling: You assign global negative traits to yourself and others. \"Im a failure\" or \"He's a rotten person. \"  5. Discounting positives: You claim that the positives that you or others attain are trivial. \"That's what wives are supposed to do--so it doesn't count when she's nice to me. \" \"Those successes were easy, so they don't matter. \"  6. Negative filter: You focus almost exclusively on the negatives and seldom notice the positives. \"Look at all of the people who don't like me. \"  7. Overgeneralizing: You perceive a global pattern of negatives on the basis of a single incident. \"This generally happens to me. I seem to fail at a lot of things. \"  8. Dichotomous thinking: You view events, or people, in all-or-nothing terms. \"I get rejected by everyone\" or \"It was a waste of time. \"  9. Shoulds: You interpret events in terms of how things should be or should have gone rather than simply focusing on what is. \"I should do well. If I don't, then I'm a failure. \"  10. Personalizing: You attribute a disproportionate amount of the blame to yourself for negative events and fail to see that certain events are also caused by others. \"The marriage ended because I failed\"  6. Blaming: You focus on the other person as the source of your negative feelings and you refuse to take responsibility for changing yourself.  \"She's to blame for the way I feel now\" or \"My parents caused all my problems. \"  12. Unfair comparisons: You interpret events in terms of standards that are unrealistic-for example, you focus primarily on others who do better than you and find yourself inferior in the comparison. \"She's more successful than I am\" or \"Others did better than I did on the test.\"  13. Regret orientation: You focus on the idea that you could have done better in the past, rather on what you can do better now. \"I could have had a better job if I had tried\". (best friends with the Shoulds)   15. What if?: You keep asking a series of questions about \"What if\" something happens and fail to be satisfied with any of the answers. \"Yeah, but what if I get anxious? Or what if I can't catch my breath? \"  15. Emotional reasoning: You let your feelings guide your interpretation of reality-for example, \"I feel depressed, therefore my marriage is not working out. \"  16. Inability to disconfirm: You reject any evidence or arguments that might contradict your negative thoughts. For example, when you have the thought \"I'm unlovable\", you reject as irrelevant any evidence that people like you. Consequently, your thought cannot be refuted. \"That's not the real issue. There are deeper problems. There are other factors. \"  17. Judgment Focus: You view yourself, others and events in terms of evaluations of good, bad or superior-inferior, rather than simply describing, accepting, or understanding. You are continually measuring yourself and others according to arbitrary standards, finding that you and others fall short. You are focused on the judgments of others as well as your own judgments of yourself. \"I didn't perform well in college\" or \"If I take up tennis, I won't do well\" or \"Look how successful she is. I'm not successful\".         Challenging Upsetting Thinking  ________________________________________________________________________  Examine your thoughts for key words:     1. must, need, got to, have to, should (unrealistic standards)   2. never, always, completely, totally, all everything, everyone (predictions / labeling)   3. awful, terrible, horrible, unbearable, disaster, worst ever (labeling / predictions)   4. jerk, slob, creep, hypocrite, bully, stupid (labels)    Dispute or question the accuracy of the questionable thoughts. 1. Am I upsetting myself unnecessarily? How can I see this another way? 2. Is my thinking working for or against me? How could I view this in a less    upsetting way? 3. What am I demanding must happen? What do I want or prefer, rather than need? 4. Am I making something too terrible? Is it really that awful? What would be so terrible about that? 5. Am I labeling a person? What is the action that I dont like? 6. Whats untrue about my thoughts? How can I stick to the facts? Whats the proof    for what I am thinking or believing about this? 7. Am I using extreme, black-and-white language? What less extreme words might     be more accurate? 8. Am I fortune telling or mind reading in a way that gets me upset or unhappy? What are the odds (percent chance -- e.g., there is a 5% chance. ..) that it will really turn out the way Im thinking or imagining? 9. What are my options in this situation? How would I like to respond? 10. Create more moderate, helpful, or realistic statements to replace the upsetting ones. 11.   Have I had any experiences that show that this thought might not be completely true? 12.   If my best friend or someone I loved had this thought, what would I tell them? 15. If my best friend or someone I loved knew I was thinking this thought, what would they say to me? What evidence would they point out to me that would suggest that my thought is not completely true? 14. Are there strengths in me or positives in the situation that I am ignoring?   Am I underestimating my ability to cope with unfortunate circumstances? 15. When I am not feeling this way, do I think about this situation any differently? How?  16. Have I been in this type of situation before? What happened? What have I learned from prior experiences that could help me now? 17. Five years from now, if I look back on this situation, will I look at it any differently? Will I focus on any different part of my experience? 25. Am I blaming myself for something over which I do not have complete control? 1. General Questions to Challenge Alarming Thoughts. A.  Am I alarming myself unnecessarily? Can I see this another way?   B. What am I demanding must happen? What do I want rather than need? C. Am I rating something a catastrophe? Is it every bit that awful? D. Am I rating a type of person? What's the action I don't like?   E. What's untrue about my thoughts? How can I stick to the facts? 2.  Strategies to Change Alarming Evaluations. A.  Listen for the extreme or catastrophic rating words (horrible, terrible, disaster. awful) of an event, a rating which implies that things couldn't be worse and you will not be able to survive the event. B. Instead of using this extreme rating when it doesn't fully apply, think of the event in terms of degree of disappointment or inconvenience. Other words might better describe the relative severity of the event such as annoying, nuisance, irritating, unfortunate, unlucky, frustration, or problem. [place] for the extreme or overly general rating of a person (loser, stupid, inconsiderate, pushy, selfish, jerk, incompetent), something which implies that there are good and bad people in the world and this person definitely is part of the bad group. D. Focus your judgment more on the specific action as the problem rather than what you believe is the general type of person involved.   Realize that you are on shaky ground whenever you think you can fairly and without a doubt categorize someone as totally fitting a particular type. It is much more relevant to think in terms of the actions  which someone did that you disagree with or you see as mistakes. This pertains to your rating of your self as well as the self of another. Examples:     Alarming Evaluation: Earnest Savage, trying to stay on this diet is terrible; I can't take it anymore. Reassuring Evaluation: Not being able to eat exactly what I used to sure feels frustrating sometimes, but I can manage. Alarming Evaluation: He just doesn't know what the hell he's doing. Besides that, he doesn't give a damn about anybody but himself. Reassuring Evaluation: I think he's making a mistake here by not involving the rest of the staff in this decision. 3.  Strategies to Change Alarming Expectations. Bright Carlhrie out the element of truth or the preference in your alarming expectation. B.  Remove the absolute demand words (must, should, need, have to) and replace them with words of preference (want to; would like; wish; it would be better if). C.  Check that your preference is reasonable considering the cost of it to your health, convenience, relationships, or your other priorities. Examples:     Alarming Expectation:  I must not ever make a mistake. Reassuring Expectation:  I want to do things well and reduce my mistakes. Alarming Expectation: You have to always treat me fairly. Reassuring Expectation: I would like you to do everything I think is right but realize you won't always see things my way. 4.  Strategies to Change Alarming Predictions. Bright Carlhrie out what you see as the alarming scenario. Giulia Go yourself what actually are the odds of this entire scene taking place. If this is not really very likely. Remind yourself of the more probable events. C.  Play out what your options could be and how you would like to respond should something like your \"what if\" scenario took place.   Think about what you might have learned from similar situations before. Examples:     Alarming Prediction: What if they look bored during my talk? Reassuring Prediction: There will probably be times when not everyone will look attentive but that's pretty common and I still know I have something worthwhile in say. Alarming Prediction: If I don't get that job, I don't know what I'll do. Reassuring Prediction: It will be disappointing if I don't get this job but I have been disappointed before and been able to bounce back. Let me see what they have to say and plan from there.

## 2023-02-23 NOTE — ED NOTES
Pt scripts x2 instructed to follow up with PCP. Assessed per Mar Carrillo NP.      Myron Johnson, RUDOLPH  [birth year] [id number] [address]

## 2023-04-10 ENCOUNTER — TELEPHONE (OUTPATIENT)
Dept: PULMONOLOGY | Age: 33
End: 2023-04-10

## 2023-05-02 ENCOUNTER — HOSPITAL ENCOUNTER (OUTPATIENT)
Dept: SLEEP CENTER | Age: 33
Discharge: HOME | End: 2023-05-04
Payer: COMMERCIAL

## 2023-05-02 DIAGNOSIS — G47.33 OSA (OBSTRUCTIVE SLEEP APNEA): ICD-10-CM

## 2023-05-02 PROCEDURE — 95806 SLEEP STUDY UNATT&RESP EFFT: CPT

## 2023-07-12 ENCOUNTER — OFFICE VISIT (OUTPATIENT)
Dept: PRIMARY CARE CLINIC | Age: 33
End: 2023-07-12

## 2023-07-12 VITALS
DIASTOLIC BLOOD PRESSURE: 62 MMHG | SYSTOLIC BLOOD PRESSURE: 98 MMHG | RESPIRATION RATE: 14 BRPM | TEMPERATURE: 97.5 F | HEART RATE: 112 BPM | BODY MASS INDEX: 21.14 KG/M2 | OXYGEN SATURATION: 98 % | WEIGHT: 131 LBS

## 2023-07-12 DIAGNOSIS — F33.1 MODERATE EPISODE OF RECURRENT MAJOR DEPRESSIVE DISORDER (HCC): ICD-10-CM

## 2023-07-12 DIAGNOSIS — Z76.89 ENCOUNTER TO ESTABLISH CARE: Primary | ICD-10-CM

## 2023-07-12 ASSESSMENT — PATIENT HEALTH QUESTIONNAIRE - PHQ9
SUM OF ALL RESPONSES TO PHQ QUESTIONS 1-9: 16
SUM OF ALL RESPONSES TO PHQ QUESTIONS 1-9: 16
8. MOVING OR SPEAKING SO SLOWLY THAT OTHER PEOPLE COULD HAVE NOTICED. OR THE OPPOSITE, BEING SO FIGETY OR RESTLESS THAT YOU HAVE BEEN MOVING AROUND A LOT MORE THAN USUAL: 2
SUM OF ALL RESPONSES TO PHQ QUESTIONS 1-9: 16
4. FEELING TIRED OR HAVING LITTLE ENERGY: 2
7. TROUBLE CONCENTRATING ON THINGS, SUCH AS READING THE NEWSPAPER OR WATCHING TELEVISION: 2
SUM OF ALL RESPONSES TO PHQ QUESTIONS 1-9: 16
3. TROUBLE FALLING OR STAYING ASLEEP: 2
5. POOR APPETITE OR OVEREATING: 1
2. FEELING DOWN, DEPRESSED OR HOPELESS: 2
9. THOUGHTS THAT YOU WOULD BE BETTER OFF DEAD, OR OF HURTING YOURSELF: 0
1. LITTLE INTEREST OR PLEASURE IN DOING THINGS: 2
SUM OF ALL RESPONSES TO PHQ9 QUESTIONS 1 & 2: 4
6. FEELING BAD ABOUT YOURSELF - OR THAT YOU ARE A FAILURE OR HAVE LET YOURSELF OR YOUR FAMILY DOWN: 3
10. IF YOU CHECKED OFF ANY PROBLEMS, HOW DIFFICULT HAVE THESE PROBLEMS MADE IT FOR YOU TO DO YOUR WORK, TAKE CARE OF THINGS AT HOME, OR GET ALONG WITH OTHER PEOPLE: 2

## 2023-07-12 ASSESSMENT — ENCOUNTER SYMPTOMS
GASTROINTESTINAL NEGATIVE: 1
COUGH: 0
SHORTNESS OF BREATH: 0

## 2023-07-12 NOTE — PROGRESS NOTES
[address]   Establish care visit   2023    Caroline Thomas (:  1990) is a 28 y.o. female, here to establish care. Chief Complaint   Patient presents with    New Patient        ASSESSMENT/ PLAN  1. Encounter to establish care    2. Moderate episode of recurrent major depressive disorder [facility])  - She has an appointment tomorrow with Dr. Robert Kay. - Discussed that Dr. Robert Kay will likely serve as a bridge until we can get her established with more specialized care. Resume with UC?   - She prefers to hold off on medication at this time. Discussed risk vs benefit and she is willing to consider if needed. 3. Post partum depression       Return in about 3 months (around 10/12/2023) for Mood, etc..    On the basis of positive PHQ-9 screening (PHQ-9 Total Score: 16), the following plan was implemented: additional evaluation and assessment performed, referral to Behavioral health provided, and patient denies medication at this time . Patient will follow-up in 1 week(s) with PCP. I will see her in the office next week when she brings her son in for his appointment. She is scheduled to see Dr. Robert Kay tomorrow. PHQ Scores 2023 2022 10/26/2022 10/23/2022 2022   PHQ2 Score 4 4 4 4 4   PHQ9 Score 16 21 17 21 19     Interpretation of Total Score Depression Severity: 1-4 = Minimal depression, 5-9 = Mild depression, 10-14 = Moderate depression, 15-19 = Moderately severe depression, 20-27 = Severe depression     HPI  Here today to establish care. Her  is 4 weeks old. She has another son Chantel Epstein who is 10.  from the Dad of the children. Currently not in a relationship with him, but he is currently living in the home to help out with the . They have never been . 12 year \"off and on\" relationship. Of note, she had a restraining order against him last year. She had a  and delivered at [facility] on . Her OB/GYN is with [facility].   She has

## 2023-12-04 ENCOUNTER — OFFICE VISIT (OUTPATIENT)
Dept: PRIMARY CARE CLINIC | Age: 33
End: 2023-12-04
Payer: COMMERCIAL

## 2023-12-04 VITALS
TEMPERATURE: 97.8 F | SYSTOLIC BLOOD PRESSURE: 100 MMHG | WEIGHT: 124 LBS | BODY MASS INDEX: 20.01 KG/M2 | OXYGEN SATURATION: 100 % | HEART RATE: 98 BPM | DIASTOLIC BLOOD PRESSURE: 70 MMHG

## 2023-12-04 DIAGNOSIS — F60.3 BORDERLINE PERSONALITY DISORDER (HCC): ICD-10-CM

## 2023-12-04 DIAGNOSIS — F41.1 GENERALIZED ANXIETY DISORDER: ICD-10-CM

## 2023-12-04 PROCEDURE — G8427 DOCREV CUR MEDS BY ELIG CLIN: HCPCS

## 2023-12-04 PROCEDURE — G8484 FLU IMMUNIZE NO ADMIN: HCPCS

## 2023-12-04 PROCEDURE — 1036F TOBACCO NON-USER: CPT

## 2023-12-04 PROCEDURE — 99213 OFFICE O/P EST LOW 20 MIN: CPT

## 2023-12-04 PROCEDURE — G8420 CALC BMI NORM PARAMETERS: HCPCS

## 2023-12-04 ASSESSMENT — ENCOUNTER SYMPTOMS
VOMITING: 0
DIARRHEA: 1
SHORTNESS OF BREATH: 0
COUGH: 0
NAUSEA: 0
ABDOMINAL PAIN: 1

## 2023-12-04 ASSESSMENT — COLUMBIA-SUICIDE SEVERITY RATING SCALE - C-SSRS
BASED ON RESPONSES TO C-SSRS QS 1-6, WHAT IS THE PATIENT'S OVERALL RISK RATING FOR SUICIDE: NO RISK
2. HAVE YOU ACTUALLY HAD ANY THOUGHTS OF KILLING YOURSELF?: NO
5. HAVE YOU STARTED TO WORK OUT OR WORKED OUT THE DETAILS OF HOW TO KILL YOURSELF? DO YOU INTEND TO CARRY OUT THIS PLAN?: NO
7. DID THIS OCCUR IN THE LAST THREE MONTHS: NO
3. HAVE YOU BEEN THINKING ABOUT HOW YOU MIGHT KILL YOURSELF?: NO
4. HAVE YOU HAD THESE THOUGHTS AND HAD SOME INTENTION OF ACTING ON THEM?: NO
1. WITHIN THE PAST MONTH, HAVE YOU WISHED YOU WERE DEAD OR WISHED YOU COULD GO TO SLEEP AND NOT WAKE UP?: NO
6. HAVE YOU EVER DONE ANYTHING, STARTED TO DO ANYTHING, OR PREPARED TO DO ANYTHING TO END YOUR LIFE?: NO

## 2023-12-04 ASSESSMENT — PATIENT HEALTH QUESTIONNAIRE - PHQ9
SUM OF ALL RESPONSES TO PHQ QUESTIONS 1-9: 20
2. FEELING DOWN, DEPRESSED OR HOPELESS: 2
7. TROUBLE CONCENTRATING ON THINGS, SUCH AS READING THE NEWSPAPER OR WATCHING TELEVISION: 2
10. IF YOU CHECKED OFF ANY PROBLEMS, HOW DIFFICULT HAVE THESE PROBLEMS MADE IT FOR YOU TO DO YOUR WORK, TAKE CARE OF THINGS AT HOME, OR GET ALONG WITH OTHER PEOPLE: 2
9. THOUGHTS THAT YOU WOULD BE BETTER OFF DEAD, OR OF HURTING YOURSELF: 1
SUM OF ALL RESPONSES TO PHQ QUESTIONS 1-9: 21
1. LITTLE INTEREST OR PLEASURE IN DOING THINGS: 2
6. FEELING BAD ABOUT YOURSELF - OR THAT YOU ARE A FAILURE OR HAVE LET YOURSELF OR YOUR FAMILY DOWN: 3
SUM OF ALL RESPONSES TO PHQ QUESTIONS 1-9: 21
SUM OF ALL RESPONSES TO PHQ9 QUESTIONS 1 & 2: 4
5. POOR APPETITE OR OVEREATING: 3
8. MOVING OR SPEAKING SO SLOWLY THAT OTHER PEOPLE COULD HAVE NOTICED. OR THE OPPOSITE, BEING SO FIGETY OR RESTLESS THAT YOU HAVE BEEN MOVING AROUND A LOT MORE THAN USUAL: 2
4. FEELING TIRED OR HAVING LITTLE ENERGY: 3
SUM OF ALL RESPONSES TO PHQ QUESTIONS 1-9: 21
3. TROUBLE FALLING OR STAYING ASLEEP: 3

## 2023-12-04 NOTE — PROGRESS NOTES
[address]  2023    Fern Duncan (:  1990) is a 28 y.o. female, here for evaluation of the following medical concerns:    Chief Complaint   Patient presents with    Depression        ASSESSMENT/ PLAN  1. Post partum depression  - [facility] [place]    2. Generalized anxiety disorder  - [facility] [place]    3. Borderline personality disorder ([address])  - [facility] [place]     - Shared decision making that I didn't want to necessarily start her on a medication today. She has a complicated history and I didn't feel comfortable initiating a SSRI. She prefers not to take medication, but understands that she may need to consider it. She may be open to resuming Abilify if needed, as this worked best in terms of her mood. However, she states that it made her feel \"funny. \"    Return in about 4 weeks (around 2024) for Mood. On the basis of positive PHQ-9 screening (PHQ-9 Total Score: 21), the following plan was implemented: additional evaluation and assessment performed, follow-up plan includes but not limited to: Medication management and Referral to /Specialist for evaluation and management. Patient will follow-up in 4 week(s) with PCP. We will get her set for therapy [facility]. 2023     8:59 AM 2023     9:57 AM 2022    11:14 AM 10/26/2022     9:21 AM 10/23/2022     4:09 PM 2022     9:25 AM   PHQ Scores   PHQ2 Score 4 4 4 4 4 4   PHQ9 Score 21 16 21 17 21 19     Interpretation of Total Score Depression Severity: 1-4 = Minimal depression, 5-9 = Mild depression, 10-14 = Moderate depression, 15-19 = Moderately severe depression, 20-27 = Severe depression     HPI  Here today to discuss her mood concerns. Her infant son is almost 7 months old. Exclusively breast feeding. Has been struggling with her mood, post-partum.  She has a

## 2024-02-01 ENCOUNTER — OFFICE VISIT (OUTPATIENT)
Dept: PRIMARY CARE CLINIC | Age: 34
End: 2024-02-01
Payer: COMMERCIAL

## 2024-02-01 VITALS
DIASTOLIC BLOOD PRESSURE: 58 MMHG | BODY MASS INDEX: 19.37 KG/M2 | HEART RATE: 74 BPM | WEIGHT: 120 LBS | SYSTOLIC BLOOD PRESSURE: 98 MMHG | TEMPERATURE: 98.1 F | OXYGEN SATURATION: 98 %

## 2024-02-01 DIAGNOSIS — F41.1 GENERALIZED ANXIETY DISORDER: ICD-10-CM

## 2024-02-01 DIAGNOSIS — N92.6 IRREGULAR MENSES: ICD-10-CM

## 2024-02-01 DIAGNOSIS — Z30.011 ENCOUNTER FOR INITIAL PRESCRIPTION OF CONTRACEPTIVE PILLS: Primary | ICD-10-CM

## 2024-02-01 LAB
CONTROL: NORMAL
PREGNANCY TEST URINE, POC: NEGATIVE

## 2024-02-01 PROCEDURE — G8420 CALC BMI NORM PARAMETERS: HCPCS

## 2024-02-01 PROCEDURE — 81025 URINE PREGNANCY TEST: CPT

## 2024-02-01 PROCEDURE — 1036F TOBACCO NON-USER: CPT

## 2024-02-01 PROCEDURE — 99213 OFFICE O/P EST LOW 20 MIN: CPT

## 2024-02-01 PROCEDURE — G8484 FLU IMMUNIZE NO ADMIN: HCPCS

## 2024-02-01 PROCEDURE — G8427 DOCREV CUR MEDS BY ELIG CLIN: HCPCS

## 2024-02-01 RX ORDER — HYDROXYZINE HYDROCHLORIDE 25 MG/1
25 TABLET, FILM COATED ORAL EVERY 8 HOURS PRN
Qty: 90 TABLET | Refills: 1 | Status: SHIPPED | OUTPATIENT
Start: 2024-02-01

## 2024-02-01 RX ORDER — ACETAMINOPHEN AND CODEINE PHOSPHATE 120; 12 MG/5ML; MG/5ML
1 SOLUTION ORAL DAILY
Qty: 1 TABLET | Refills: 12 | Status: SHIPPED | OUTPATIENT
Start: 2024-02-01

## 2024-02-01 RX ORDER — MAGNESIUM 200 MG
210 TABLET ORAL DAILY
COMMUNITY

## 2024-02-01 ASSESSMENT — PATIENT HEALTH QUESTIONNAIRE - PHQ9
SUM OF ALL RESPONSES TO PHQ QUESTIONS 1-9: 14
10. IF YOU CHECKED OFF ANY PROBLEMS, HOW DIFFICULT HAVE THESE PROBLEMS MADE IT FOR YOU TO DO YOUR WORK, TAKE CARE OF THINGS AT HOME, OR GET ALONG WITH OTHER PEOPLE: 2
9. THOUGHTS THAT YOU WOULD BE BETTER OFF DEAD, OR OF HURTING YOURSELF: 1
6. FEELING BAD ABOUT YOURSELF - OR THAT YOU ARE A FAILURE OR HAVE LET YOURSELF OR YOUR FAMILY DOWN: 2
2. FEELING DOWN, DEPRESSED OR HOPELESS: 1
1. LITTLE INTEREST OR PLEASURE IN DOING THINGS: 2
SUM OF ALL RESPONSES TO PHQ QUESTIONS 1-9: 14
3. TROUBLE FALLING OR STAYING ASLEEP: 1
SUM OF ALL RESPONSES TO PHQ9 QUESTIONS 1 & 2: 3
7. TROUBLE CONCENTRATING ON THINGS, SUCH AS READING THE NEWSPAPER OR WATCHING TELEVISION: 2
SUM OF ALL RESPONSES TO PHQ QUESTIONS 1-9: 13
SUM OF ALL RESPONSES TO PHQ QUESTIONS 1-9: 14
8. MOVING OR SPEAKING SO SLOWLY THAT OTHER PEOPLE COULD HAVE NOTICED. OR THE OPPOSITE, BEING SO FIGETY OR RESTLESS THAT YOU HAVE BEEN MOVING AROUND A LOT MORE THAN USUAL: 2
5. POOR APPETITE OR OVEREATING: 2

## 2024-02-01 ASSESSMENT — ENCOUNTER SYMPTOMS
COUGH: 0
NAUSEA: 0
DIARRHEA: 1
SHORTNESS OF BREATH: 0
VOMITING: 0

## 2024-02-01 ASSESSMENT — COLUMBIA-SUICIDE SEVERITY RATING SCALE - C-SSRS
6. HAVE YOU EVER DONE ANYTHING, STARTED TO DO ANYTHING, OR PREPARED TO DO ANYTHING TO END YOUR LIFE?: NO
1. WITHIN THE PAST MONTH, HAVE YOU WISHED YOU WERE DEAD OR WISHED YOU COULD GO TO SLEEP AND NOT WAKE UP?: NO
2. HAVE YOU ACTUALLY HAD ANY THOUGHTS OF KILLING YOURSELF?: NO

## 2024-02-01 NOTE — PROGRESS NOTES
[facility]  2024    Ilene Bernabe (:  1990) is a 33 y.o. female, here for evaluation of the following medical concerns:    Chief Complaint   Patient presents with    Contraception        ASSESSMENT/ PLAN  1. Encounter for initial prescription of contraceptive pills  - norethindrone (ORTHO MICRONOR) 0.35 MG tablet; Take 1 tablet by mouth daily  Dispense: 1 tablet; Refill: 12    2. Irregular menses  - POCT urine pregnancy  - norethindrone (ORTHO MICRONOR) 0.35 MG tablet; Take 1 tablet by mouth daily  Dispense: 1 tablet; Refill: 12    3. Generalized anxiety disorder  - hydrOXYzine HCl (ATARAX) 25 MG tablet; Take 1 tablet by mouth every 8 hours as needed for Anxiety  Dispense: 90 tablet; Refill: 1       - Discussed options for contraception given current breast feeding status. She is not interested in an IUD or implant.  - Shared decision making to do the \"mini pill.\" Discussed MOA and appropriate use, as well as risks & benefits. Explained that for it to be effective, she should take it consistently every day at the same time.  - We also discussed her goal/desire to continue breastfeeding Irwin, along with her needing to take care of herself, etc.       Return in about 3 months (around 2024).    HPI  She is here today to discuss birth control options.   Previously, she was on Depo injections for about 14 years.   Took a ABEL when she was younger to help regular her periods.     Historically, has had issues with periods as an adolescent.  Started birth control at age 15 because she hadn't yet had a period.   Long history of heavy menstrual bleeding with spotting.   She has had 2 children. Reports that she has had multiple miscarriages in the past.     Her youngest, Irwin is currently 7 months old.  She had a period in November that lasted for 5 days. Had another period on  that lasted 5-6 days again.  She has not had a period since then.  States that there is a chance

## 2024-03-26 ENCOUNTER — NURSE ONLY (OUTPATIENT)
Dept: PRIMARY CARE CLINIC | Age: 34
End: 2024-03-26
Payer: COMMERCIAL

## 2024-03-26 DIAGNOSIS — N92.6 IRREGULAR MENSES: Primary | ICD-10-CM

## 2024-03-26 LAB
CONTROL: NORMAL
PREGNANCY TEST URINE, POC: NORMAL

## 2024-03-26 PROCEDURE — 81025 URINE PREGNANCY TEST: CPT

## 2024-04-11 ENCOUNTER — OFFICE VISIT (OUTPATIENT)
Dept: PRIMARY CARE CLINIC | Age: 34
End: 2024-04-11
Payer: COMMERCIAL

## 2024-04-11 VITALS
TEMPERATURE: 97.5 F | HEART RATE: 101 BPM | OXYGEN SATURATION: 98 % | DIASTOLIC BLOOD PRESSURE: 60 MMHG | BODY MASS INDEX: 18.88 KG/M2 | RESPIRATION RATE: 14 BRPM | WEIGHT: 117 LBS | SYSTOLIC BLOOD PRESSURE: 100 MMHG

## 2024-04-11 DIAGNOSIS — R63.4 WEIGHT LOSS: ICD-10-CM

## 2024-04-11 DIAGNOSIS — N92.6 IRREGULAR MENSES: Primary | ICD-10-CM

## 2024-04-11 LAB
CONTROL: NORMAL
PREGNANCY TEST URINE, POC: NEGATIVE

## 2024-04-11 PROCEDURE — 1036F TOBACCO NON-USER: CPT

## 2024-04-11 PROCEDURE — G8427 DOCREV CUR MEDS BY ELIG CLIN: HCPCS

## 2024-04-11 PROCEDURE — 81025 URINE PREGNANCY TEST: CPT

## 2024-04-11 PROCEDURE — 99213 OFFICE O/P EST LOW 20 MIN: CPT

## 2024-04-11 PROCEDURE — G8420 CALC BMI NORM PARAMETERS: HCPCS

## 2024-04-11 PROCEDURE — 36415 COLL VENOUS BLD VENIPUNCTURE: CPT

## 2024-04-11 SDOH — ECONOMIC STABILITY: INCOME INSECURITY: HOW HARD IS IT FOR YOU TO PAY FOR THE VERY BASICS LIKE FOOD, HOUSING, MEDICAL CARE, AND HEATING?: NOT HARD AT ALL

## 2024-04-11 SDOH — ECONOMIC STABILITY: HOUSING INSECURITY
IN THE LAST 12 MONTHS, WAS THERE A TIME WHEN YOU DID NOT HAVE A STEADY PLACE TO SLEEP OR SLEPT IN A SHELTER (INCLUDING NOW)?: NO

## 2024-04-11 SDOH — ECONOMIC STABILITY: FOOD INSECURITY: WITHIN THE PAST 12 MONTHS, THE FOOD YOU BOUGHT JUST DIDN'T LAST AND YOU DIDN'T HAVE MONEY TO GET MORE.: NEVER TRUE

## 2024-04-11 SDOH — ECONOMIC STABILITY: FOOD INSECURITY: WITHIN THE PAST 12 MONTHS, YOU WORRIED THAT YOUR FOOD WOULD RUN OUT BEFORE YOU GOT MONEY TO BUY MORE.: NEVER TRUE

## 2024-04-11 ASSESSMENT — ENCOUNTER SYMPTOMS
SHORTNESS OF BREATH: 0
COUGH: 0
VOMITING: 0
NAUSEA: 0

## 2024-04-11 NOTE — PROGRESS NOTES
[facility]  2024    Ilene Bernabe (:  1990) is a 34 y.o. female, here for evaluation of the following medical concerns:    Chief Complaint   Patient presents with    Menstrual Problem    Extremity Weakness     Numbness & tingling        ASSESSMENT/ PLAN  1. Irregular menses  - POCT urine pregnancy  - CBC with Auto Differential  - Comprehensive Metabolic Panel  - TSH with Reflex to FT4    2. Weight loss  - CBC with Auto Differential  - Comprehensive Metabolic Panel  - TSH with Reflex to FT4       Return in about 1 month (around 2024).    HPI  Here today with period concerns and some additional concerns.  LMP was 24-24.  Using condoms.  Planning on following up with [facility]. She has a desire to get a tubal ligation. No desire for future children.    Stopped breast feeding.  Hasn't pumped in 3 days. No longer leaking.  Drinking roberto tea.   Trying ibuprofen for the pain/discomfort.     This has been a hard time for her. She wanted to wean her son off the breast, but instead had to stop overnight because he had THC in his system when he was in the hospital.   [facility] is currently involved.   She is having to do drug screenings. Next 2 weeks.   She is still doing therapy 2x/week.    Losing weight. Feeling weak and fatigued.  Denies fever/chills.  Having diarrhea; multiple episodes a day. Worsens with stress.  Appetite is poor.  Doing probiotics.   Magnesium supplement.    Feels safe. Denies any SI or self-harm.     Family hx of diabetes.       ROS  Review of Systems   Constitutional:  Positive for appetite change, fatigue and unexpected weight change. Negative for chills and fever.   Respiratory:  Negative for cough and shortness of breath.    Cardiovascular:  Negative for chest pain.   Gastrointestinal:  Negative for nausea and vomiting.   Genitourinary:  Positive for menstrual problem. Negative for frequency.   Musculoskeletal:  Negative for myalgias.   Skin:

## 2024-04-12 DIAGNOSIS — E61.1 IRON DEFICIENCY: Primary | ICD-10-CM

## 2024-04-12 LAB
ALBUMIN SERPL-MCNC: 4.7 G/DL (ref 3.4–5)
ALBUMIN/GLOB SERPL: 2.5 {RATIO} (ref 1.1–2.2)
ALP SERPL-CCNC: 59 U/L (ref 40–129)
ALT SERPL-CCNC: 12 U/L (ref 10–40)
ANION GAP SERPL CALCULATED.3IONS-SCNC: 12 MMOL/L (ref 3–16)
AST SERPL-CCNC: 14 U/L (ref 15–37)
BASOPHILS # BLD: 0.1 K/UL (ref 0–0.2)
BASOPHILS NFR BLD: 1 %
BILIRUB SERPL-MCNC: 0.5 MG/DL (ref 0–1)
BUN SERPL-MCNC: 19 MG/DL (ref 7–20)
CALCIUM SERPL-MCNC: 9.6 MG/DL (ref 8.3–10.6)
CHLORIDE SERPL-SCNC: 107 MMOL/L (ref 99–110)
CO2 SERPL-SCNC: 20 MMOL/L (ref 21–32)
CREAT SERPL-MCNC: 0.9 MG/DL (ref 0.6–1.1)
DEPRECATED RDW RBC AUTO: 12.2 % (ref 12.4–15.4)
EOSINOPHIL # BLD: 0.3 K/UL (ref 0–0.6)
EOSINOPHIL NFR BLD: 4 %
GFR SERPLBLD CREATININE-BSD FMLA CKD-EPI: 85 ML/MIN/{1.73_M2}
GLUCOSE SERPL-MCNC: 70 MG/DL (ref 70–99)
HCT VFR BLD AUTO: 35.9 % (ref 36–48)
HGB BLD-MCNC: 12.3 G/DL (ref 12–16)
LYMPHOCYTES # BLD: 2.4 K/UL (ref 1–5.1)
LYMPHOCYTES NFR BLD: 28.4 %
MCH RBC QN AUTO: 33.1 PG (ref 26–34)
MCHC RBC AUTO-ENTMCNC: 34.3 G/DL (ref 31–36)
MCV RBC AUTO: 96.2 FL (ref 80–100)
MONOCYTES # BLD: 0.5 K/UL (ref 0–1.3)
MONOCYTES NFR BLD: 5.7 %
NEUTROPHILS # BLD: 5 K/UL (ref 1.7–7.7)
NEUTROPHILS NFR BLD: 60.9 %
PLATELET # BLD AUTO: 198 K/UL (ref 135–450)
PMV BLD AUTO: 9.1 FL (ref 5–10.5)
POTASSIUM SERPL-SCNC: 4.2 MMOL/L (ref 3.5–5.1)
PROT SERPL-MCNC: 6.6 G/DL (ref 6.4–8.2)
RBC # BLD AUTO: 3.73 M/UL (ref 4–5.2)
SODIUM SERPL-SCNC: 139 MMOL/L (ref 136–145)
TSH SERPL DL<=0.005 MIU/L-ACNC: 0.8 UIU/ML (ref 0.27–4.2)
WBC # BLD AUTO: 8.3 K/UL (ref 4–11)

## 2024-04-12 RX ORDER — FERROUS SULFATE 325(65) MG
325 TABLET ORAL
Qty: 90 TABLET | Refills: 1 | Status: SHIPPED | OUTPATIENT
Start: 2024-04-12

## 2024-04-18 ENCOUNTER — TELEPHONE (OUTPATIENT)
Dept: PRIMARY CARE CLINIC | Age: 34
End: 2024-04-18

## 2024-04-18 NOTE — TELEPHONE ENCOUNTER
Patient's Psychiatrist  is starting her on Abilify and she would like a base line EKG and then do it again in another two months. This is because of her Hx of Tachycardia.   I told her I would call her back in the morning after you saw the message.

## 2024-04-19 DIAGNOSIS — R00.0 TACHYCARDIA: ICD-10-CM

## 2024-04-19 DIAGNOSIS — F60.3 BORDERLINE PERSONALITY DISORDER (HCC): Primary | ICD-10-CM

## 2024-04-19 NOTE — TELEPHONE ENCOUNTER
Spoke with Ilene and she will call when she is able to get transportation. She did say she will be starting the medication today.   She said the Psychiatrist's name is Day Mcgrath and she is with [facility].

## 2024-04-19 NOTE — TELEPHONE ENCOUNTER
Ok, no problem. I can order it and she can have it done here at her convenience. She will just have to let us know so we can put her on the lab/MA schedule.    Can you please ask her who is going to be prescribing that for her ? I wasn't aware that she was seeing a psychiatrist in addition to her therapist. Is this with [facility] also?    Thanks!

## 2024-05-21 ENCOUNTER — HOSPITAL ENCOUNTER (OUTPATIENT)
Age: 34
Discharge: HOME | End: 2024-05-21
Payer: COMMERCIAL

## 2024-05-21 ENCOUNTER — HOSPITAL ENCOUNTER (OUTPATIENT)
Dept: GENERAL RADIOLOGY | Age: 34
Discharge: HOME | End: 2024-05-21
Payer: COMMERCIAL

## 2024-05-21 ENCOUNTER — OFFICE VISIT (OUTPATIENT)
Dept: PRIMARY CARE CLINIC | Age: 34
End: 2024-05-21

## 2024-05-21 VITALS
WEIGHT: 109 LBS | RESPIRATION RATE: 16 BRPM | DIASTOLIC BLOOD PRESSURE: 60 MMHG | TEMPERATURE: 98 F | HEART RATE: 105 BPM | SYSTOLIC BLOOD PRESSURE: 98 MMHG | BODY MASS INDEX: 17.59 KG/M2 | OXYGEN SATURATION: 99 %

## 2024-05-21 DIAGNOSIS — R06.2 WHEEZING: ICD-10-CM

## 2024-05-21 DIAGNOSIS — J22 ACUTE LOWER RESPIRATORY TRACT INFECTION: ICD-10-CM

## 2024-05-21 DIAGNOSIS — J22 ACUTE LOWER RESPIRATORY TRACT INFECTION: Primary | ICD-10-CM

## 2024-05-21 PROCEDURE — 71046 X-RAY EXAM CHEST 2 VIEWS: CPT

## 2024-05-21 RX ORDER — AZITHROMYCIN 250 MG/1
TABLET, FILM COATED ORAL
Qty: 6 TABLET | Refills: 0 | Status: SHIPPED | OUTPATIENT
Start: 2024-05-21 | End: 2024-05-31

## 2024-05-21 RX ORDER — ALBUTEROL SULFATE 2.5 MG/3ML
2.5 SOLUTION RESPIRATORY (INHALATION) ONCE
Status: COMPLETED | OUTPATIENT
Start: 2024-05-21 | End: 2024-05-21

## 2024-05-21 RX ORDER — ALBUTEROL SULFATE 90 UG/1
2 AEROSOL, METERED RESPIRATORY (INHALATION) EVERY 6 HOURS PRN
Qty: 18 G | Refills: 3 | Status: SHIPPED | OUTPATIENT
Start: 2024-05-21

## 2024-05-21 RX ORDER — GUAIFENESIN 600 MG/1
1200 TABLET, EXTENDED RELEASE ORAL 2 TIMES DAILY
Qty: 40 TABLET | Refills: 0 | Status: SHIPPED | OUTPATIENT
Start: 2024-05-21 | End: 2024-05-31

## 2024-05-21 RX ADMIN — ALBUTEROL SULFATE 2.5 MG: 2.5 SOLUTION RESPIRATORY (INHALATION) at 12:00

## 2024-05-21 ASSESSMENT — ENCOUNTER SYMPTOMS
GASTROINTESTINAL NEGATIVE: 1
WHEEZING: 1
SINUS PAIN: 0
SHORTNESS OF BREATH: 1
TROUBLE SWALLOWING: 0
EYE REDNESS: 0
COUGH: 1
RHINORRHEA: 1
CHEST TIGHTNESS: 1
SINUS PRESSURE: 0
SORE THROAT: 0

## 2024-05-21 NOTE — PATIENT INSTRUCTIONS
Mucinex twice a day with lots of water!  Complete all 6 tablets of the antibiotic    Rescue inhaler as needed for chest tightness/wheezing, etc.

## 2024-05-21 NOTE — PROGRESS NOTES
BP: 98/60     Pulse: 96 99 (!) 105   Resp: 16     Temp: 98 °F (36.7 °C)     TempSrc: Oral     SpO2: 95% 98% 99%  Comment: after nebulizer treatment   Weight: 49.4 kg (109 lb)       Estimated body mass index is 17.59 kg/m² as calculated from the following:    Height as of 10/18/22: 1.676 m (5' 6\").    Weight as of this encounter: 49.4 kg (109 lb).    Physical Exam  Vitals reviewed.   Constitutional:       General: She is not in acute distress.     Appearance: Normal appearance. She is ill-appearing. She is not toxic-appearing or diaphoretic.   HENT:      Head: Normocephalic.      Right Ear: Tympanic membrane, ear canal and external ear normal.      Left Ear: Tympanic membrane, ear canal and external ear normal.      Nose: Nose normal.      Mouth/Throat:      Mouth: Mucous membranes are moist.      Pharynx: Oropharynx is clear. No oropharyngeal exudate or posterior oropharyngeal erythema.   Eyes:      Extraocular Movements: Extraocular movements intact.      Conjunctiva/sclera: Conjunctivae normal.      Pupils: Pupils are equal, round, and reactive to light.   Cardiovascular:      Rate and Rhythm: Normal rate and regular rhythm.      Pulses: Normal pulses.      Heart sounds: Normal heart sounds.   Pulmonary:      Effort: Pulmonary effort is normal.      Breath sounds: Wheezing and rhonchi (diffuse) present.   Abdominal:      General: Abdomen is flat. Bowel sounds are normal.      Palpations: Abdomen is soft.   Musculoskeletal:         General: Normal range of motion.      Cervical back: Normal range of motion and neck supple.   Lymphadenopathy:      Cervical: No cervical adenopathy.   Skin:     General: Skin is warm and dry.      Capillary Refill: Capillary refill takes less than 2 seconds.      Coloration: Skin is pale.   Neurological:      General: No focal deficit present.      Mental Status: She is alert.   Psychiatric:         Mood and Affect: Mood normal.         Mirta Vickers, APRN - CNP

## 2024-06-20 ENCOUNTER — NURSE ONLY (OUTPATIENT)
Dept: PRIMARY CARE CLINIC | Age: 34
End: 2024-06-20
Payer: COMMERCIAL

## 2024-06-20 DIAGNOSIS — Z01.818 PRE-OP EXAMINATION: Primary | ICD-10-CM

## 2024-06-20 DIAGNOSIS — R00.0 TACHYCARDIA: ICD-10-CM

## 2024-06-20 PROCEDURE — 93000 ELECTROCARDIOGRAM COMPLETE: CPT
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
## 2024-08-14 DIAGNOSIS — J22 ACUTE LOWER RESPIRATORY TRACT INFECTION: ICD-10-CM

## 2024-08-14 DIAGNOSIS — R06.2 WHEEZING: ICD-10-CM

## 2024-08-14 RX ORDER — ALBUTEROL SULFATE 90 UG/1
2 AEROSOL, METERED RESPIRATORY (INHALATION) EVERY 6 HOURS PRN
Qty: 18 G | Refills: 3 | Status: SHIPPED | OUTPATIENT
Start: 2024-08-14

## 2024-08-19 ENCOUNTER — OFFICE VISIT (OUTPATIENT)
Dept: PRIMARY CARE CLINIC | Age: 34
End: 2024-08-19
Payer: COMMERCIAL

## 2024-08-19 VITALS
SYSTOLIC BLOOD PRESSURE: 100 MMHG | HEART RATE: 90 BPM | TEMPERATURE: 97.9 F | WEIGHT: 110 LBS | DIASTOLIC BLOOD PRESSURE: 60 MMHG | OXYGEN SATURATION: 99 % | RESPIRATION RATE: 14 BRPM | BODY MASS INDEX: 17.75 KG/M2

## 2024-08-19 DIAGNOSIS — B96.89 BACTERIAL VAGINOSIS: Primary | ICD-10-CM

## 2024-08-19 DIAGNOSIS — N76.0 BACTERIAL VAGINOSIS: Primary | ICD-10-CM

## 2024-08-19 DIAGNOSIS — N89.8 VAGINAL DISCHARGE: ICD-10-CM

## 2024-08-19 DIAGNOSIS — R30.0 DYSURIA: ICD-10-CM

## 2024-08-19 LAB
BILIRUBIN, POC: NORMAL
BLOOD URINE, POC: NORMAL
CLARITY, POC: CLEAR
COLOR, POC: YELLOW
GLUCOSE URINE, POC: NORMAL
KETONES, POC: NORMAL
LEUKOCYTE EST, POC: NORMAL
NITRITE, POC: NORMAL
PH, POC: 7.5
PROTEIN, POC: NORMAL
SPECIFIC GRAVITY, POC: 1.02
UROBILINOGEN, POC: 0.2

## 2024-08-19 PROCEDURE — G8419 CALC BMI OUT NRM PARAM NOF/U: HCPCS

## 2024-08-19 PROCEDURE — 81002 URINALYSIS NONAUTO W/O SCOPE: CPT

## 2024-08-19 PROCEDURE — G8427 DOCREV CUR MEDS BY ELIG CLIN: HCPCS

## 2024-08-19 PROCEDURE — 1036F TOBACCO NON-USER: CPT

## 2024-08-19 PROCEDURE — 99213 OFFICE O/P EST LOW 20 MIN: CPT

## 2024-08-19 RX ORDER — METRONIDAZOLE 500 MG/1
500 TABLET ORAL 2 TIMES DAILY
Qty: 14 TABLET | Refills: 0 | Status: SHIPPED | OUTPATIENT
Start: 2024-08-19 | End: 2024-08-26

## 2024-08-19 ASSESSMENT — ENCOUNTER SYMPTOMS
NAUSEA: 1
VOMITING: 0
ABDOMINAL PAIN: 0
SHORTNESS OF BREATH: 0
DIARRHEA: 1
COUGH: 0

## 2024-08-19 NOTE — PROGRESS NOTES
[facility]  2024    Ilene Bernabe (:  1990) is a 34 y.o. female, here for evaluation of the following medical concerns:    Chief Complaint   Patient presents with    Dysuria    Vaginal Discharge        ASSESSMENT/ PLAN  1. Bacterial vaginosis  - metroNIDAZOLE (FLAGYL) 500 MG tablet; Take 1 tablet by mouth 2 times daily for 7 days  Dispense: 14 tablet; Refill: 0    2. Vaginal discharge  - C.trachomatis N.gonorrhoeae DNA, Urine    3. Dysuria  - POCT Urinalysis no Micro  - Culture, Urine     - Will treat for BV based on symptoms and patient history.    Return if symptoms worsen or fail to improve.    HPI  Having some urinary and vaginal symptoms.  Started a few days ago.  +dysuria  +vaginal discharge & odor  +lower abd/pelvic pain.  +nausea & diarrhea.  Denies fever. Having some chills.  States that she has had BV frequently and that this feels similar.    Recently got a job at [employer]; working 3rd.   She is with the kids during the day.   States that she is exhausted. \"Was up for 40+ hours recently.\"       ROS  Review of Systems   Constitutional:  Positive for appetite change, chills, fatigue and unexpected weight change. Negative for fever.   HENT: Negative.     Respiratory:  Negative for cough and shortness of breath.    Cardiovascular:  Negative for chest pain, palpitations and leg swelling.   Gastrointestinal:  Positive for diarrhea and nausea. Negative for abdominal pain and vomiting.   Genitourinary:  Positive for dyspareunia, dysuria, pelvic pain and vaginal discharge.   Musculoskeletal:  Negative for myalgias.   Psychiatric/Behavioral:  Positive for dysphoric mood and sleep disturbance.        HISTORIES  Current Outpatient Medications on File Prior to Visit   Medication Sig Dispense Refill    ferrous sulfate (IRON 325) 325 (65 Fe) MG tablet Take 1 tablet by mouth daily (with breakfast) 90 tablet 1    VENTOLIN  (90 Base) MCG/ACT inhaler INHALE 2 PUFFS INTO THE LUNGS

## 2024-08-21 LAB — BACTERIA UR CULT: NORMAL

## 2024-08-22 LAB
C TRACH DNA UR QL NAA+PROBE: NEGATIVE
N GONORRHOEA DNA UR QL NAA+PROBE: NEGATIVE

## 2024-09-11 DIAGNOSIS — E61.1 IRON DEFICIENCY: ICD-10-CM

## 2024-09-11 RX ORDER — FERROUS SULFATE 325(65) MG
1 TABLET ORAL
Qty: 90 TABLET | Refills: 1 | Status: SHIPPED | OUTPATIENT
Start: 2024-09-11

## 2024-10-03 ENCOUNTER — OFFICE VISIT (OUTPATIENT)
Dept: PRIMARY CARE CLINIC | Age: 34
End: 2024-10-03
Payer: COMMERCIAL

## 2024-10-03 VITALS
WEIGHT: 110 LBS | TEMPERATURE: 98 F | OXYGEN SATURATION: 100 % | SYSTOLIC BLOOD PRESSURE: 100 MMHG | DIASTOLIC BLOOD PRESSURE: 60 MMHG | BODY MASS INDEX: 17.75 KG/M2 | HEART RATE: 77 BPM

## 2024-10-03 DIAGNOSIS — R10.84 GENERALIZED ABDOMINAL PAIN: ICD-10-CM

## 2024-10-03 DIAGNOSIS — E61.1 IRON DEFICIENCY: ICD-10-CM

## 2024-10-03 DIAGNOSIS — R30.0 DYSURIA: ICD-10-CM

## 2024-10-03 DIAGNOSIS — R11.0 NAUSEA: ICD-10-CM

## 2024-10-03 DIAGNOSIS — N30.00 ACUTE CYSTITIS WITHOUT HEMATURIA: Primary | ICD-10-CM

## 2024-10-03 LAB
BILIRUBIN, POC: ABNORMAL
BLOOD URINE, POC: ABNORMAL
CLARITY, POC: CLEAR
COLOR, POC: YELLOW
CONTROL: NORMAL
GLUCOSE URINE, POC: ABNORMAL MG/DL
KETONES, POC: ABNORMAL MG/DL
LEUKOCYTE EST, POC: ABNORMAL
NITRITE, POC: ABNORMAL
PH, POC: 6
PREGNANCY TEST URINE, POC: NEGATIVE
PROTEIN, POC: ABNORMAL MG/DL
SPECIFIC GRAVITY, POC: >=1.03
UROBILINOGEN, POC: 0.2 MG/DL

## 2024-10-03 PROCEDURE — G8484 FLU IMMUNIZE NO ADMIN: HCPCS

## 2024-10-03 PROCEDURE — G8427 DOCREV CUR MEDS BY ELIG CLIN: HCPCS

## 2024-10-03 PROCEDURE — 81002 URINALYSIS NONAUTO W/O SCOPE: CPT

## 2024-10-03 PROCEDURE — 99213 OFFICE O/P EST LOW 20 MIN: CPT

## 2024-10-03 PROCEDURE — G8419 CALC BMI OUT NRM PARAM NOF/U: HCPCS

## 2024-10-03 PROCEDURE — 1036F TOBACCO NON-USER: CPT

## 2024-10-03 PROCEDURE — 36415 COLL VENOUS BLD VENIPUNCTURE: CPT

## 2024-10-03 PROCEDURE — 81025 URINE PREGNANCY TEST: CPT

## 2024-10-03 RX ORDER — NITROFURANTOIN 25; 75 MG/1; MG/1
100 CAPSULE ORAL 2 TIMES DAILY
Qty: 10 CAPSULE | Refills: 0 | Status: SHIPPED | OUTPATIENT
Start: 2024-10-03 | End: 2024-10-08

## 2024-10-03 RX ORDER — ONDANSETRON 4 MG/1
4 TABLET, ORALLY DISINTEGRATING ORAL 3 TIMES DAILY PRN
Qty: 21 TABLET | Refills: 0 | Status: SHIPPED | OUTPATIENT
Start: 2024-10-03

## 2024-10-03 ASSESSMENT — ENCOUNTER SYMPTOMS
NAUSEA: 1
VOMITING: 0
SHORTNESS OF BREATH: 0
COUGH: 0

## 2024-10-03 NOTE — PROGRESS NOTES
Ear: External ear normal.      Left Ear: External ear normal.      Nose: Nose normal.      Mouth/Throat:      Mouth: Mucous membranes are moist.   Eyes:      Extraocular Movements: Extraocular movements intact.      Conjunctiva/sclera: Conjunctivae normal.      Pupils: Pupils are equal, round, and reactive to light.   Cardiovascular:      Rate and Rhythm: Normal rate and regular rhythm.      Pulses: Normal pulses.      Heart sounds: Normal heart sounds.   Pulmonary:      Effort: Pulmonary effort is normal.      Breath sounds: Normal breath sounds.   Abdominal:      General: Abdomen is flat. Bowel sounds are normal.      Palpations: Abdomen is soft.      Tenderness: There is no abdominal tenderness. There is no right CVA tenderness or left CVA tenderness.   Musculoskeletal:         General: Normal range of motion.      Cervical back: Normal range of motion and neck supple.   Skin:     General: Skin is warm and dry.      Capillary Refill: Capillary refill takes less than 2 seconds.      Coloration: Skin is pale.   Neurological:      General: No focal deficit present.      Mental Status: She is alert and oriented to person, place, and time.   Psychiatric:         Mood and Affect: Mood normal.         Behavior: Behavior normal.         Mirta Vickers, APRN - CNP

## 2024-10-03 NOTE — PATIENT INSTRUCTIONS
1. Increase water intake  2. Avoid caffeine for 5-7 days  3. Return if symptoms reoccur of fail to improve  4. Complete full course of antibiotics     Siliq Pregnancy And Lactation Text: The risk during pregnancy and breastfeeding is uncertain with this medication.

## 2024-10-04 DIAGNOSIS — D53.9 MACROCYTIC ANEMIA: Primary | ICD-10-CM

## 2024-10-04 LAB
BASOPHILS # BLD: 0.1 K/UL (ref 0–0.2)
BASOPHILS NFR BLD: 1 %
DEPRECATED RDW RBC AUTO: 12.7 % (ref 12.4–15.4)
EOSINOPHIL # BLD: 0.2 K/UL (ref 0–0.6)
EOSINOPHIL NFR BLD: 3.3 %
HCT VFR BLD AUTO: 38.7 % (ref 36–48)
HGB BLD-MCNC: 13.4 G/DL (ref 12–16)
LYMPHOCYTES # BLD: 2.4 K/UL (ref 1–5.1)
LYMPHOCYTES NFR BLD: 34.4 %
MCH RBC QN AUTO: 35 PG (ref 26–34)
MCHC RBC AUTO-ENTMCNC: 34.6 G/DL (ref 31–36)
MCV RBC AUTO: 101.2 FL (ref 80–100)
MONOCYTES # BLD: 0.5 K/UL (ref 0–1.3)
MONOCYTES NFR BLD: 6.8 %
NEUTROPHILS # BLD: 3.8 K/UL (ref 1.7–7.7)
NEUTROPHILS NFR BLD: 54.5 %
PLATELET # BLD AUTO: 171 K/UL (ref 135–450)
PMV BLD AUTO: 8.8 FL (ref 5–10.5)
RBC # BLD AUTO: 3.83 M/UL (ref 4–5.2)
WBC # BLD AUTO: 7 K/UL (ref 4–11)

## 2024-10-06 LAB
BACTERIA UR CULT: ABNORMAL
ORGANISM: ABNORMAL

## 2024-10-07 DIAGNOSIS — E61.1 IRON DEFICIENCY: ICD-10-CM

## 2024-10-07 DIAGNOSIS — N30.00 ACUTE CYSTITIS WITHOUT HEMATURIA: Primary | ICD-10-CM

## 2024-10-07 DIAGNOSIS — Z86.2 HISTORY OF IRON DEFICIENCY ANEMIA: Primary | ICD-10-CM

## 2024-10-07 RX ORDER — FERROUS SULFATE 325(65) MG
1 TABLET ORAL
Qty: 90 TABLET | Refills: 1 | Status: SHIPPED | OUTPATIENT
Start: 2024-10-07

## 2024-10-07 RX ORDER — CIPROFLOXACIN 500 MG/1
500 TABLET, FILM COATED ORAL 2 TIMES DAILY
Qty: 10 TABLET | Refills: 0 | Status: SHIPPED | OUTPATIENT
Start: 2024-10-07 | End: 2024-10-12

## 2024-10-08 ENCOUNTER — NURSE ONLY (OUTPATIENT)
Dept: PRIMARY CARE CLINIC | Age: 34
End: 2024-10-08
Payer: COMMERCIAL

## 2024-10-08 DIAGNOSIS — Z86.2 HISTORY OF IRON DEFICIENCY ANEMIA: ICD-10-CM

## 2024-10-08 DIAGNOSIS — D53.9 MACROCYTIC ANEMIA: ICD-10-CM

## 2024-10-08 PROCEDURE — 36415 COLL VENOUS BLD VENIPUNCTURE: CPT

## 2024-10-08 NOTE — PROGRESS NOTES
Patient came into the office per physician's request for the following blood test(s): Ferritin, Iron and TIBC, vitamin B12 & folate.    Blood drawn in office by DL    # of tubes sent: 2 SST

## 2024-10-09 LAB
FERRITIN SERPL IA-MCNC: 78.9 NG/ML (ref 15–150)
FOLATE SERPL-MCNC: 12.3 NG/ML (ref 4.78–24.2)
IRON SATN MFR SERPL: 46 % (ref 15–50)
IRON SERPL-MCNC: 130 UG/DL (ref 37–145)
TIBC SERPL-MCNC: 282 UG/DL (ref 260–445)
VIT B12 SERPL-MCNC: 358 PG/ML (ref 211–911)

## 2024-12-13 ENCOUNTER — TELEPHONE (OUTPATIENT)
Dept: PRIMARY CARE CLINIC | Age: 34
End: 2024-12-13

## 2024-12-13 DIAGNOSIS — G25.1 TREMOR DUE TO MULTIPLE DRUGS: ICD-10-CM

## 2024-12-13 DIAGNOSIS — F39 MOOD DISORDER (HCC): Primary | ICD-10-CM

## 2024-12-13 RX ORDER — BENZTROPINE MESYLATE 1 MG/1
1 TABLET ORAL 2 TIMES DAILY
Qty: 6 TABLET | Refills: 0 | Status: SHIPPED | OUTPATIENT
Start: 2024-12-13 | End: 2024-12-16

## 2024-12-13 RX ORDER — ZIPRASIDONE HYDROCHLORIDE 40 MG/1
40 CAPSULE ORAL 2 TIMES DAILY WITH MEALS
Qty: 6 CAPSULE | Refills: 0 | Status: SHIPPED | OUTPATIENT
Start: 2024-12-13 | End: 2024-12-16

## 2024-12-13 RX ORDER — BUSPIRONE HYDROCHLORIDE 10 MG/1
10 TABLET ORAL 3 TIMES DAILY
Qty: 9 TABLET | Refills: 0 | Status: SHIPPED | OUTPATIENT
Start: 2024-12-13 | End: 2024-12-16

## 2024-12-13 NOTE — TELEPHONE ENCOUNTER
Contacted Allyson Patino to obtain/verify the patient's medications to bridge her through until her Psychiatrist appointment but there was no answer. Message left. When contacting [facility], there is no answer as it states that they are closed. Covering provider sent refills to bridge patient until appointment with Psychiatrist.

## 2024-12-13 NOTE — TELEPHONE ENCOUNTER
Patient called and she was at [facility] as a in patient for a week. They sent her home with some new medications to get her through until she can get in with a new Psychiatrist. She has an appointment with her new psychiatrist Dr. Rodgers at [facility] on Tuesday 12/17/24, but she will be out of her medication this Sunday and is afraid to stop these new meds.     She is wanting to know if they can be filled until she sees him.     Geodon 40mg BID  Buspar 10mg TID  Benztropine 1mg BID  [facility]    I told her that Mirta was out of the office and I would have to send it to another provider and was not sure they would be able to fill any of these medications.

## 2024-12-13 NOTE — TELEPHONE ENCOUNTER
Called the pharmacy after their lunch break and spoke with Taina the pharmacist. She stated that the refill that Gifty Rodriguez sent over was going to be put on hold because the received a 30 day supply from [place] on all three of the medications. They are filled and ready to be picked up.     I then spoke with Gifty and she said to go ahead and cancel out the 3 days of the medications that she filled since [facility] already filled them.     Called back the pharmacy and canceled them. Patient notified

## 2025-03-31 ENCOUNTER — TELEPHONE (OUTPATIENT)
Dept: FAMILY MEDICINE CLINIC | Age: 35
End: 2025-03-31

## 2025-05-22 ENCOUNTER — HOSPITAL ENCOUNTER (EMERGENCY)
Age: 35
Discharge: HOME | End: 2025-05-22
Attending: EMERGENCY MEDICINE
Payer: COMMERCIAL

## 2025-05-22 VITALS
SYSTOLIC BLOOD PRESSURE: 106 MMHG | DIASTOLIC BLOOD PRESSURE: 69 MMHG | TEMPERATURE: 98.7 F | OXYGEN SATURATION: 99 % | HEART RATE: 91 BPM | RESPIRATION RATE: 18 BRPM

## 2025-05-22 DIAGNOSIS — Z3A.15 15 WEEKS GESTATION OF PREGNANCY: ICD-10-CM

## 2025-05-22 DIAGNOSIS — R07.9 CHEST PAIN, UNSPECIFIED TYPE: Primary | ICD-10-CM

## 2025-05-22 LAB
ALBUMIN SERPL-MCNC: 3.3 G/DL (ref 3.4–5)
ALBUMIN/GLOB SERPL: 1.4 {RATIO} (ref 1.1–2.2)
ALP SERPL-CCNC: 30 U/L (ref 40–129)
ALT SERPL-CCNC: 10 U/L (ref 10–40)
ANION GAP SERPL CALCULATED.3IONS-SCNC: 10 MMOL/L (ref 3–16)
AST SERPL-CCNC: 13 U/L (ref 15–37)
BASOPHILS # BLD: 0 K/UL (ref 0–0.2)
BASOPHILS NFR BLD: 0.5 %
BILIRUB SERPL-MCNC: <0.2 MG/DL (ref 0–1)
BUN SERPL-MCNC: 6 MG/DL (ref 7–20)
CALCIUM SERPL-MCNC: 8.2 MG/DL (ref 8.3–10.6)
CHLORIDE SERPL-SCNC: 103 MMOL/L (ref 99–110)
CO2 SERPL-SCNC: 20 MMOL/L (ref 21–32)
CREAT SERPL-MCNC: 0.5 MG/DL (ref 0.6–1.1)
DEPRECATED RDW RBC AUTO: 12.8 % (ref 12.4–15.4)
EKG ATRIAL RATE: 74 BPM
EKG DIAGNOSIS: NORMAL
EKG P AXIS: 47 DEGREES
EKG P-R INTERVAL: 140 MS
EKG Q-T INTERVAL: 376 MS
EKG QRS DURATION: 86 MS
EKG QTC CALCULATION (BAZETT): 417 MS
EKG R AXIS: 72 DEGREES
EKG T AXIS: 26 DEGREES
EKG VENTRICULAR RATE: 74 BPM
EOSINOPHIL # BLD: 0.3 K/UL (ref 0–0.6)
EOSINOPHIL NFR BLD: 4.2 %
GFR SERPLBLD CREATININE-BSD FMLA CKD-EPI: >90 ML/MIN/{1.73_M2}
GLUCOSE SERPL-MCNC: 81 MG/DL (ref 70–99)
HCT VFR BLD AUTO: 30.6 % (ref 36–48)
HGB BLD-MCNC: 10.7 G/DL (ref 12–16)
LYMPHOCYTES # BLD: 1.9 K/UL (ref 1–5.1)
LYMPHOCYTES NFR BLD: 26 %
MCH RBC QN AUTO: 33.9 PG (ref 26–34)
MCHC RBC AUTO-ENTMCNC: 34.9 G/DL (ref 31–36)
MCV RBC AUTO: 97.2 FL (ref 80–100)
MONOCYTES # BLD: 0.4 K/UL (ref 0–1.3)
MONOCYTES NFR BLD: 5.5 %
NEUTROPHILS # BLD: 4.7 K/UL (ref 1.7–7.7)
NEUTROPHILS NFR BLD: 63.8 %
PLATELET # BLD AUTO: 167 K/UL (ref 135–450)
PMV BLD AUTO: 7.4 FL (ref 5–10.5)
POTASSIUM SERPL-SCNC: 3.6 MMOL/L (ref 3.5–5.1)
PROT SERPL-MCNC: 5.6 G/DL (ref 6.4–8.2)
RBC # BLD AUTO: 3.15 M/UL (ref 4–5.2)
SODIUM SERPL-SCNC: 133 MMOL/L (ref 136–145)
TROPONIN, HIGH SENSITIVITY: <6 NG/L (ref 0–14)
WBC # BLD AUTO: 7.3 K/UL (ref 4–11)

## 2025-05-22 PROCEDURE — 99284 EMERGENCY DEPT VISIT MOD MDM: CPT

## 2025-05-22 PROCEDURE — 85025 COMPLETE CBC W/AUTO DIFF WBC: CPT

## 2025-05-22 PROCEDURE — 93010 ELECTROCARDIOGRAM REPORT: CPT | Performed by: INTERNAL MEDICINE

## 2025-05-22 PROCEDURE — 84484 ASSAY OF TROPONIN QUANT: CPT

## 2025-05-22 PROCEDURE — 93005 ELECTROCARDIOGRAM TRACING: CPT | Performed by: EMERGENCY MEDICINE

## 2025-05-22 PROCEDURE — 80053 COMPREHEN METABOLIC PANEL: CPT

## 2025-05-22 NOTE — ED NOTES
Discharge instruction gone over, patient denies any further questions. Ambulated to car without difficulty. Alert and oriented x4 at discharge

## 2025-05-22 NOTE — ED PROVIDER NOTES
[facility] EMERGENCY DEPARTMENT      CHIEF COMPLAINT  Chest Pain (Been aching on and off for days, got in small argument and started hurting worse. Also was told not to lift son up and has been. 4 aspirin given en route. History of mitral valve issues. 15 weeks pregnant )       HISTORY OF PRESENT ILLNESS  Ilene Bernabe is a 35 y.o. female  who presents to the ED complaining of concerns for chest discomfort.  She has been having an achiness in her chest.  Is worse under stress.  She is currently 15 weeks pregnant.  She arrived via EMS and aspirin was given prior to arrival.  She denies any abdominal pain, cramping or vaginal bleeding.  No loss of fluids.  She does however state that she is high risk pregnancy as her pregnancy prior to this resulted and unfortunately in a stillbirth 6 days prior to her due date.  Patient denies any fevers or cough.  Patient is currently a non-smoker although does have a previous history.    No other complaints, modifying factors or associated symptoms.     I have reviewed the following from the nursing documentation.    Past Medical History:   Diagnosis Date    Anxiety     Depression     IBS (irritable bowel syndrome)     Intestinal malrotation (HCC)     Migraine     Tachycardia     causes fainting     Past Surgical History:   Procedure Laterality Date    APPENDECTOMY  2015    CHOLECYSTECTOMY  2015    COLONOSCOPY      DENTAL SURGERY  2021    top teeth removed; dentures    DILATION AND CURETTAGE OF UTERUS  2009    miscarriage    OTHER SURGICAL HISTORY      pt states she had pre-cancerous cells removed from uterus    UPPER GASTROINTESTINAL ENDOSCOPY       Family History   Problem Relation Age of Onset    Mental Illness Mother      Social History     Socioeconomic History    Marital status: Single     Spouse name: Not on file    Number of children: 1    Years of education: 12    Highest education level: Not on file   Occupational History     Employer: [employer]   Tobacco Use    Smoking

## 2025-05-22 NOTE — ED NOTES
Provider gave okay to discharge, looked at [facility] themselves with US machine. Stated okay to cancel repeat troponin

## 2025-05-23 ENCOUNTER — TELEPHONE (OUTPATIENT)
Dept: FAMILY MEDICINE CLINIC | Age: 35
End: 2025-05-23

## 2025-05-23 NOTE — TELEPHONE ENCOUNTER
Patient is calling in because she is 16 weeks pregnant and had to go to the hospital yesterday and she wants madai to know that and to look over her labs and call her. Please advise

## 2025-05-24 ASSESSMENT — HEART SCORE: ECG: NORMAL

## 2025-06-02 ENCOUNTER — OFFICE VISIT (OUTPATIENT)
Dept: FAMILY MEDICINE CLINIC | Age: 35
End: 2025-06-02
Payer: COMMERCIAL

## 2025-06-02 VITALS
BODY MASS INDEX: 21.21 KG/M2 | HEART RATE: 92 BPM | HEIGHT: 66 IN | OXYGEN SATURATION: 98 % | DIASTOLIC BLOOD PRESSURE: 64 MMHG | RESPIRATION RATE: 16 BRPM | SYSTOLIC BLOOD PRESSURE: 100 MMHG | WEIGHT: 132 LBS

## 2025-06-02 DIAGNOSIS — B07.0 PLANTAR WART OF RIGHT FOOT: Primary | ICD-10-CM

## 2025-06-02 PROCEDURE — 1036F TOBACCO NON-USER: CPT

## 2025-06-02 PROCEDURE — 99213 OFFICE O/P EST LOW 20 MIN: CPT

## 2025-06-02 PROCEDURE — 17110 DESTRUCTION B9 LES UP TO 14: CPT

## 2025-06-02 PROCEDURE — G8427 DOCREV CUR MEDS BY ELIG CLIN: HCPCS

## 2025-06-02 PROCEDURE — G8420 CALC BMI NORM PARAMETERS: HCPCS

## 2025-06-02 SDOH — ECONOMIC STABILITY: FOOD INSECURITY: WITHIN THE PAST 12 MONTHS, THE FOOD YOU BOUGHT JUST DIDN'T LAST AND YOU DIDN'T HAVE MONEY TO GET MORE.: NEVER TRUE

## 2025-06-02 SDOH — ECONOMIC STABILITY: FOOD INSECURITY: WITHIN THE PAST 12 MONTHS, YOU WORRIED THAT YOUR FOOD WOULD RUN OUT BEFORE YOU GOT MONEY TO BUY MORE.: NEVER TRUE

## 2025-06-02 ASSESSMENT — PATIENT HEALTH QUESTIONNAIRE - PHQ9
2. FEELING DOWN, DEPRESSED OR HOPELESS: NOT AT ALL
5. POOR APPETITE OR OVEREATING: NOT AT ALL
SUM OF ALL RESPONSES TO PHQ QUESTIONS 1-9: 0
SUM OF ALL RESPONSES TO PHQ QUESTIONS 1-9: 0
10. IF YOU CHECKED OFF ANY PROBLEMS, HOW DIFFICULT HAVE THESE PROBLEMS MADE IT FOR YOU TO DO YOUR WORK, TAKE CARE OF THINGS AT HOME, OR GET ALONG WITH OTHER PEOPLE: NOT DIFFICULT AT ALL
8. MOVING OR SPEAKING SO SLOWLY THAT OTHER PEOPLE COULD HAVE NOTICED. OR THE OPPOSITE, BEING SO FIGETY OR RESTLESS THAT YOU HAVE BEEN MOVING AROUND A LOT MORE THAN USUAL: NOT AT ALL
7. TROUBLE CONCENTRATING ON THINGS, SUCH AS READING THE NEWSPAPER OR WATCHING TELEVISION: NOT AT ALL
4. FEELING TIRED OR HAVING LITTLE ENERGY: NOT AT ALL
9. THOUGHTS THAT YOU WOULD BE BETTER OFF DEAD, OR OF HURTING YOURSELF: NOT AT ALL
6. FEELING BAD ABOUT YOURSELF - OR THAT YOU ARE A FAILURE OR HAVE LET YOURSELF OR YOUR FAMILY DOWN: NOT AT ALL
DEPRESSION UNABLE TO ASSESS: FUNCTIONAL CAPACITY MOTIVATION LIMITS ACCURACY
SUM OF ALL RESPONSES TO PHQ QUESTIONS 1-9: 0
3. TROUBLE FALLING OR STAYING ASLEEP: NOT AT ALL
1. LITTLE INTEREST OR PLEASURE IN DOING THINGS: NOT AT ALL
SUM OF ALL RESPONSES TO PHQ QUESTIONS 1-9: 0

## 2025-06-02 NOTE — PROGRESS NOTES
25    Chief Complaint   Patient presents with    Other     planter wart on R foot xabt a month -hurts to walk on       HPI: Ilene Bernabe is a 35 y.o. female who presents for a plantar wart that is painful to walk on. Has been present for approx 2 months. Pt has had plantar warts in the past but never this painful.     Past Medical History:   Diagnosis Date    Anxiety     Depression     IBS (irritable bowel syndrome)     Intestinal malrotation (HCC)     Migraine     Tachycardia     causes fainting       Past Surgical History:   Procedure Laterality Date    APPENDECTOMY      CHOLECYSTECTOMY      COLONOSCOPY      DENTAL SURGERY      top teeth removed; dentures    DILATION AND CURETTAGE OF UTERUS      miscarriage    OTHER SURGICAL HISTORY      pt states she had pre-cancerous cells removed from uterus    UPPER GASTROINTESTINAL ENDOSCOPY         Social History     Tobacco Use    Smoking status: Former     Current packs/day: 0.00     Average packs/day: 1 pack/day for 17.4 years (17.4 ttl pk-yrs)     Types: Cigarettes     Start date:      Quit date: 2023     Years since quittin.9    Smokeless tobacco: Never   Vaping Use    Vaping status: Former    Substances: THC   Substance Use Topics    Alcohol use: Not Currently     Comment: rare    Drug use: Yes     Types: Marijuana (Weed)     Comment: daily       Family History   Problem Relation Age of Onset    Mental Illness Mother      Physical Exam  Musculoskeletal:        Feet:    Feet:      Right foot:      Skin integrity: Skin integrity normal.      Left foot:      Skin integrity: Skin integrity normal.       Vitals:    25 1308   BP: 100/64   BP Site: Left Upper Arm   Patient Position: Sitting   Pulse: 92   Resp: 16   SpO2: 98%   Weight: 59.9 kg (132 lb)   Height: 1.676 m (5' 6\")       Assessment/Plan:   Diagnosis Orders   1. Plantar wart of right foot  DESTRUC BENIGN LESION, UP TO 14 LESIONS          Plan:  Cryotherapy to wart using

## 2025-06-02 NOTE — TELEPHONE ENCOUNTER
I am just now getting this message.    I saw her last week in the office with her son and were able to talk for a bit.  Thanks

## 2025-06-13 DIAGNOSIS — B07.0 PLANTAR WART OF RIGHT FOOT: Primary | ICD-10-CM

## 2025-08-13 ENCOUNTER — OFFICE VISIT (OUTPATIENT)
Dept: FAMILY MEDICINE CLINIC | Age: 35
End: 2025-08-13
Payer: COMMERCIAL

## 2025-08-13 VITALS
WEIGHT: 142 LBS | SYSTOLIC BLOOD PRESSURE: 100 MMHG | DIASTOLIC BLOOD PRESSURE: 60 MMHG | OXYGEN SATURATION: 100 % | BODY MASS INDEX: 22.92 KG/M2

## 2025-08-13 DIAGNOSIS — J02.9 ACUTE VIRAL PHARYNGITIS: Primary | ICD-10-CM

## 2025-08-13 LAB
INFLUENZA A ANTIGEN, POC: NEGATIVE
INFLUENZA B ANTIGEN, POC: NEGATIVE
LOT EXPIRE DATE: NORMAL
LOT KIT NUMBER: NORMAL
SARS-COV-2, POC: NORMAL
VALID INTERNAL CONTROL: NORMAL
VENDOR AND KIT NAME POC: NORMAL

## 2025-08-13 PROCEDURE — G8420 CALC BMI NORM PARAMETERS: HCPCS

## 2025-08-13 PROCEDURE — 87428 SARSCOV & INF VIR A&B AG IA: CPT

## 2025-08-13 PROCEDURE — G8427 DOCREV CUR MEDS BY ELIG CLIN: HCPCS

## 2025-08-13 PROCEDURE — 99214 OFFICE O/P EST MOD 30 MIN: CPT

## 2025-08-13 PROCEDURE — 1036F TOBACCO NON-USER: CPT
